# Patient Record
Sex: MALE | Race: WHITE | NOT HISPANIC OR LATINO | Employment: OTHER | URBAN - METROPOLITAN AREA
[De-identification: names, ages, dates, MRNs, and addresses within clinical notes are randomized per-mention and may not be internally consistent; named-entity substitution may affect disease eponyms.]

---

## 2017-05-03 ENCOUNTER — GENERIC CONVERSION - ENCOUNTER (OUTPATIENT)
Dept: OTHER | Facility: OTHER | Age: 77
End: 2017-05-03

## 2017-05-19 ENCOUNTER — GENERIC CONVERSION - ENCOUNTER (OUTPATIENT)
Dept: OTHER | Facility: OTHER | Age: 77
End: 2017-05-19

## 2017-05-31 ENCOUNTER — GENERIC CONVERSION - ENCOUNTER (OUTPATIENT)
Dept: OTHER | Facility: OTHER | Age: 77
End: 2017-05-31

## 2017-06-01 ENCOUNTER — GENERIC CONVERSION - ENCOUNTER (OUTPATIENT)
Dept: OTHER | Facility: OTHER | Age: 77
End: 2017-06-01

## 2017-06-20 ENCOUNTER — APPOINTMENT (OUTPATIENT)
Dept: CARDIAC REHAB | Facility: HOSPITAL | Age: 77
End: 2017-06-20
Payer: MEDICARE

## 2017-06-20 PROCEDURE — 93797 PHYS/QHP OP CAR RHAB WO ECG: CPT

## 2017-06-23 ENCOUNTER — APPOINTMENT (OUTPATIENT)
Dept: LAB | Facility: HOSPITAL | Age: 77
End: 2017-06-23
Attending: INTERNAL MEDICINE
Payer: MEDICARE

## 2017-06-23 ENCOUNTER — TRANSCRIBE ORDERS (OUTPATIENT)
Dept: ADMINISTRATIVE | Facility: HOSPITAL | Age: 77
End: 2017-06-23

## 2017-06-23 ENCOUNTER — ALLSCRIPTS OFFICE VISIT (OUTPATIENT)
Dept: OTHER | Facility: OTHER | Age: 77
End: 2017-06-23

## 2017-06-23 DIAGNOSIS — I25.10 ATHEROSCLEROTIC HEART DISEASE OF NATIVE CORONARY ARTERY WITHOUT ANGINA PECTORIS: ICD-10-CM

## 2017-06-23 LAB
ANION GAP SERPL CALCULATED.3IONS-SCNC: 5 MMOL/L (ref 4–13)
BUN SERPL-MCNC: 19 MG/DL (ref 5–25)
CALCIUM SERPL-MCNC: 8.9 MG/DL (ref 8.3–10.1)
CHLORIDE SERPL-SCNC: 101 MMOL/L (ref 100–108)
CO2 SERPL-SCNC: 34 MMOL/L (ref 21–32)
CREAT SERPL-MCNC: 1.26 MG/DL (ref 0.6–1.3)
GFR SERPL CREATININE-BSD FRML MDRD: 55.6 ML/MIN/1.73SQ M
GLUCOSE SERPL-MCNC: 103 MG/DL (ref 65–140)
POTASSIUM SERPL-SCNC: 3.7 MMOL/L (ref 3.5–5.3)
SODIUM SERPL-SCNC: 140 MMOL/L (ref 136–145)

## 2017-06-23 PROCEDURE — 80048 BASIC METABOLIC PNL TOTAL CA: CPT

## 2017-06-23 PROCEDURE — 36415 COLL VENOUS BLD VENIPUNCTURE: CPT

## 2017-06-26 ENCOUNTER — APPOINTMENT (OUTPATIENT)
Dept: CARDIAC REHAB | Facility: HOSPITAL | Age: 77
End: 2017-06-26
Payer: MEDICARE

## 2017-06-26 PROCEDURE — 93798 PHYS/QHP OP CAR RHAB W/ECG: CPT

## 2017-06-28 ENCOUNTER — APPOINTMENT (OUTPATIENT)
Dept: CARDIAC REHAB | Facility: HOSPITAL | Age: 77
End: 2017-06-28
Payer: MEDICARE

## 2017-06-28 PROCEDURE — 93798 PHYS/QHP OP CAR RHAB W/ECG: CPT

## 2017-06-30 ENCOUNTER — APPOINTMENT (OUTPATIENT)
Dept: CARDIAC REHAB | Facility: HOSPITAL | Age: 77
End: 2017-06-30
Payer: MEDICARE

## 2017-07-03 ENCOUNTER — APPOINTMENT (OUTPATIENT)
Dept: CARDIAC REHAB | Facility: HOSPITAL | Age: 77
End: 2017-07-03
Payer: MEDICARE

## 2017-07-05 ENCOUNTER — APPOINTMENT (OUTPATIENT)
Dept: CARDIAC REHAB | Facility: HOSPITAL | Age: 77
End: 2017-07-05
Payer: MEDICARE

## 2017-07-05 PROCEDURE — 93798 PHYS/QHP OP CAR RHAB W/ECG: CPT

## 2017-07-07 ENCOUNTER — APPOINTMENT (OUTPATIENT)
Dept: CARDIAC REHAB | Facility: HOSPITAL | Age: 77
End: 2017-07-07
Payer: MEDICARE

## 2017-07-07 PROCEDURE — 93798 PHYS/QHP OP CAR RHAB W/ECG: CPT

## 2017-07-10 ENCOUNTER — APPOINTMENT (OUTPATIENT)
Dept: CARDIAC REHAB | Facility: HOSPITAL | Age: 77
End: 2017-07-10
Payer: MEDICARE

## 2017-07-12 ENCOUNTER — APPOINTMENT (OUTPATIENT)
Dept: CARDIAC REHAB | Facility: HOSPITAL | Age: 77
End: 2017-07-12
Payer: MEDICARE

## 2017-07-12 PROCEDURE — 93798 PHYS/QHP OP CAR RHAB W/ECG: CPT

## 2017-07-14 ENCOUNTER — APPOINTMENT (OUTPATIENT)
Dept: CARDIAC REHAB | Facility: HOSPITAL | Age: 77
End: 2017-07-14
Payer: MEDICARE

## 2017-07-14 PROCEDURE — 93798 PHYS/QHP OP CAR RHAB W/ECG: CPT

## 2017-07-17 ENCOUNTER — APPOINTMENT (OUTPATIENT)
Dept: CARDIAC REHAB | Facility: HOSPITAL | Age: 77
End: 2017-07-17
Payer: MEDICARE

## 2017-07-17 PROCEDURE — 93798 PHYS/QHP OP CAR RHAB W/ECG: CPT

## 2017-07-19 ENCOUNTER — APPOINTMENT (OUTPATIENT)
Dept: CARDIAC REHAB | Facility: HOSPITAL | Age: 77
End: 2017-07-19
Payer: MEDICARE

## 2017-07-19 PROCEDURE — 93798 PHYS/QHP OP CAR RHAB W/ECG: CPT

## 2017-07-21 ENCOUNTER — APPOINTMENT (OUTPATIENT)
Dept: CARDIAC REHAB | Facility: HOSPITAL | Age: 77
End: 2017-07-21
Payer: MEDICARE

## 2017-07-21 PROCEDURE — 93798 PHYS/QHP OP CAR RHAB W/ECG: CPT

## 2017-07-24 ENCOUNTER — APPOINTMENT (OUTPATIENT)
Dept: CARDIAC REHAB | Facility: HOSPITAL | Age: 77
End: 2017-07-24
Payer: MEDICARE

## 2017-07-24 PROCEDURE — 93798 PHYS/QHP OP CAR RHAB W/ECG: CPT

## 2017-07-26 ENCOUNTER — APPOINTMENT (OUTPATIENT)
Dept: CARDIAC REHAB | Facility: HOSPITAL | Age: 77
End: 2017-07-26
Payer: MEDICARE

## 2017-07-26 PROCEDURE — 93798 PHYS/QHP OP CAR RHAB W/ECG: CPT

## 2017-07-28 ENCOUNTER — APPOINTMENT (OUTPATIENT)
Dept: CARDIAC REHAB | Facility: HOSPITAL | Age: 77
End: 2017-07-28
Payer: MEDICARE

## 2017-07-31 ENCOUNTER — APPOINTMENT (OUTPATIENT)
Dept: CARDIAC REHAB | Facility: HOSPITAL | Age: 77
End: 2017-07-31
Payer: MEDICARE

## 2017-07-31 PROCEDURE — 93798 PHYS/QHP OP CAR RHAB W/ECG: CPT

## 2017-08-02 ENCOUNTER — APPOINTMENT (OUTPATIENT)
Dept: CARDIAC REHAB | Facility: HOSPITAL | Age: 77
End: 2017-08-02
Payer: MEDICARE

## 2017-08-04 ENCOUNTER — APPOINTMENT (OUTPATIENT)
Dept: CARDIAC REHAB | Facility: HOSPITAL | Age: 77
End: 2017-08-04
Payer: MEDICARE

## 2017-08-04 PROCEDURE — 93798 PHYS/QHP OP CAR RHAB W/ECG: CPT

## 2017-08-07 ENCOUNTER — APPOINTMENT (OUTPATIENT)
Dept: CARDIAC REHAB | Facility: HOSPITAL | Age: 77
End: 2017-08-07
Payer: MEDICARE

## 2017-08-09 ENCOUNTER — APPOINTMENT (OUTPATIENT)
Dept: CARDIAC REHAB | Facility: HOSPITAL | Age: 77
End: 2017-08-09
Payer: MEDICARE

## 2017-08-09 PROCEDURE — 93798 PHYS/QHP OP CAR RHAB W/ECG: CPT

## 2017-08-11 ENCOUNTER — APPOINTMENT (OUTPATIENT)
Dept: CARDIAC REHAB | Facility: HOSPITAL | Age: 77
End: 2017-08-11
Payer: MEDICARE

## 2017-08-11 PROCEDURE — 93798 PHYS/QHP OP CAR RHAB W/ECG: CPT

## 2017-08-14 ENCOUNTER — APPOINTMENT (OUTPATIENT)
Dept: CARDIAC REHAB | Facility: HOSPITAL | Age: 77
End: 2017-08-14
Payer: MEDICARE

## 2017-08-14 PROCEDURE — 93798 PHYS/QHP OP CAR RHAB W/ECG: CPT

## 2017-08-16 ENCOUNTER — APPOINTMENT (OUTPATIENT)
Dept: CARDIAC REHAB | Facility: HOSPITAL | Age: 77
End: 2017-08-16
Payer: MEDICARE

## 2017-08-16 PROCEDURE — 93798 PHYS/QHP OP CAR RHAB W/ECG: CPT

## 2017-08-18 ENCOUNTER — APPOINTMENT (OUTPATIENT)
Dept: CARDIAC REHAB | Facility: HOSPITAL | Age: 77
End: 2017-08-18
Payer: MEDICARE

## 2017-08-18 PROCEDURE — 93798 PHYS/QHP OP CAR RHAB W/ECG: CPT

## 2017-08-25 ENCOUNTER — APPOINTMENT (OUTPATIENT)
Dept: CARDIAC REHAB | Facility: HOSPITAL | Age: 77
End: 2017-08-25
Payer: MEDICARE

## 2017-08-25 PROCEDURE — 93798 PHYS/QHP OP CAR RHAB W/ECG: CPT

## 2017-08-28 ENCOUNTER — APPOINTMENT (OUTPATIENT)
Dept: CARDIAC REHAB | Facility: HOSPITAL | Age: 77
End: 2017-08-28
Payer: MEDICARE

## 2017-08-28 PROCEDURE — 93798 PHYS/QHP OP CAR RHAB W/ECG: CPT

## 2018-01-14 VITALS — SYSTOLIC BLOOD PRESSURE: 150 MMHG | HEART RATE: 62 BPM | DIASTOLIC BLOOD PRESSURE: 90 MMHG | WEIGHT: 228 LBS

## 2018-06-11 ENCOUNTER — OFFICE VISIT (OUTPATIENT)
Dept: CARDIOLOGY CLINIC | Facility: CLINIC | Age: 78
End: 2018-06-11
Payer: MEDICARE

## 2018-06-11 VITALS
SYSTOLIC BLOOD PRESSURE: 160 MMHG | WEIGHT: 223 LBS | DIASTOLIC BLOOD PRESSURE: 60 MMHG | HEART RATE: 64 BPM | BODY MASS INDEX: 31.92 KG/M2 | HEIGHT: 70 IN

## 2018-06-11 DIAGNOSIS — I48.0 PAROXYSMAL ATRIAL FIBRILLATION (HCC): Primary | ICD-10-CM

## 2018-06-11 PROCEDURE — 93000 ELECTROCARDIOGRAM COMPLETE: CPT | Performed by: INTERNAL MEDICINE

## 2018-06-11 PROCEDURE — 99214 OFFICE O/P EST MOD 30 MIN: CPT | Performed by: INTERNAL MEDICINE

## 2018-06-11 RX ORDER — METOPROLOL SUCCINATE 25 MG/1
TABLET, EXTENDED RELEASE ORAL
Status: ON HOLD | COMMUNITY
Start: 2017-06-23 | End: 2018-06-21

## 2018-06-11 RX ORDER — WARFARIN SODIUM 2.5 MG/1
2.5 TABLET ORAL
COMMUNITY
Start: 2017-06-23 | End: 2018-06-22 | Stop reason: HOSPADM

## 2018-06-11 RX ORDER — FAMOTIDINE 20 MG/1
TABLET, FILM COATED ORAL
COMMUNITY
Start: 2017-06-23 | End: 2019-01-07 | Stop reason: SDUPTHER

## 2018-06-11 RX ORDER — TRAMADOL HYDROCHLORIDE 50 MG/1
TABLET ORAL AS NEEDED
COMMUNITY
Start: 2017-06-23 | End: 2021-08-11 | Stop reason: SDUPTHER

## 2018-06-11 RX ORDER — FUROSEMIDE 80 MG
TABLET ORAL
COMMUNITY
Start: 2017-06-23 | End: 2018-06-22 | Stop reason: HOSPADM

## 2018-06-11 RX ORDER — TAMSULOSIN HYDROCHLORIDE 0.4 MG/1
CAPSULE ORAL
COMMUNITY
Start: 2017-06-23 | End: 2019-02-04 | Stop reason: SDUPTHER

## 2018-06-11 RX ORDER — POTASSIUM CHLORIDE 750 MG/1
TABLET, EXTENDED RELEASE ORAL 2 TIMES DAILY
COMMUNITY
Start: 2017-06-23 | End: 2018-06-22 | Stop reason: HOSPADM

## 2018-06-11 NOTE — PROGRESS NOTES
Cardiology Follow Up    Oscar Kwon  1940  [id number]  [address] [facility] [place] No  [address]  [address] [facility] [address]  Paroxysmal atrial fibrillation (HCC)  POCT ECG       Interval History: Followup for PAF and CAD    Hospitalized for atrial fibrillation and cholecystectomy while in [place]  Last episode of symptomatic atrial fibrillation was in May  No past medical history on file  Social History     Social History    Marital status: /Civil Union     Spouse name: N/A    Number of children: N/A    Years of education: N/A     Occupational History    Not on file  Social History Main Topics    Smoking status: Not on file    Smokeless tobacco: Not on file    Alcohol use Not on file    Drug use: Unknown    Sexual activity: Not on file     Other Topics Concern    Not on file     Social History Narrative    No narrative on file      No family history on file  No past surgical history on file      Current Outpatient Prescriptions:     famotidine (PEPCID) 20 mg tablet, Take by mouth, Disp: , Rfl:     furosemide (LASIX) 80 mg tablet, Take by mouth, Disp: , Rfl:     metoprolol succinate (TOPROL-XL) 25 mg 24 hr tablet, Take by mouth, Disp: , Rfl:     potassium chloride (KLOR-CON M10) 10 mEq tablet, Take by mouth, Disp: , Rfl:     tamsulosin (FLOMAX) 0 4 mg, Take by mouth, Disp: , Rfl:     warfarin (COUMADIN) 2 5 mg tablet, Take by mouth, Disp: , Rfl:     traMADol (ULTRAM) 50 mg tablet, Take by mouth, Disp: , Rfl:   No Known Allergies    Labs:     Chemistry        Component Value Date/Time     06/23/2017 1455    K 3 7 06/23/2017 1455     06/23/2017 1455    CO2 34 (H) 06/23/2017 1455    BUN 19 06/23/2017 1455    CREATININE 1 26 06/23/2017 1455        Component Value Date/Time    CALCIUM 8 9 06/23/2017 1455            No results found for: CHOL  No results found for: HDL  No results found for: [address]  No results found for: TRIG  No components found for: CHOLHDL    Imaging: No results found  EKG: Normal Sinus Rhythm  Review of Systems   Constitution: Negative  HENT: Negative  Eyes: Negative  Cardiovascular: Negative  Respiratory: Negative  Endocrine: Negative  Hematologic/Lymphatic: Negative  Skin: Negative  Musculoskeletal: Negative  Gastrointestinal: Negative  Genitourinary: Negative  Neurological: Negative  Psychiatric/Behavioral: Negative  Allergic/Immunologic: Negative  Vitals:    06/11/18 1301   BP: 160/60   Pulse: 64           Physical Exam   Constitutional: He is oriented to person, place, and time  No distress  HENT:   Mouth/Throat: No oropharyngeal exudate  Eyes: No scleral icterus  Neck: No JVD present  Cardiovascular: Normal rate and regular rhythm  No murmur heard  Pulmonary/Chest: Effort normal and breath sounds normal  No respiratory distress  He has no wheezes  He has no rales  Abdominal: Soft  Bowel sounds are normal  He exhibits no distension  There is no tenderness  There is no rebound  Musculoskeletal: He exhibits no edema  Neurological: He is alert and oriented to person, place, and time  Skin: Skin is warm and dry  He is not diaphoretic  Psychiatric: He has a normal mood and affect  His behavior is normal      EKG: NSR  Normal ECG  Discussion/Summary:    Coronary Artery Disease s/p CABG and bioprosthetic AVR  Will review records from his [facility] cardiologist  Unclear why he is off atorvastatin  PAF: In NSR today  Coumadin managed by his [facility] cardiologist  We discussed afib ablation    Cardiomyopathy: Continue Furosemide  Check echocardiogram      PVD: Continue medical therapy  The patient was counseled regarding diagnostic results, instructions for management, risk factor reductions, impressions  total time of encounter was 25 minutes and 15 minutes was spent counseling

## 2018-06-11 NOTE — PATIENT INSTRUCTIONS
Awaiting records from your usual cardiologist    We discussed atrial fibrillation ablation    Check Echocardiogram

## 2018-06-21 ENCOUNTER — HOSPITAL ENCOUNTER (INPATIENT)
Facility: HOSPITAL | Age: 78
LOS: 1 days | Discharge: HOME | DRG: 309 | End: 2018-06-22
Attending: EMERGENCY MEDICINE | Admitting: INTERNAL MEDICINE
Payer: MEDICARE

## 2018-06-21 DIAGNOSIS — I48.0 PAROXYSMAL ATRIAL FIBRILLATION (HCC): Primary | ICD-10-CM

## 2018-06-21 DIAGNOSIS — E87.6 HYPOKALEMIA: ICD-10-CM

## 2018-06-21 PROBLEM — I10 ESSENTIAL HYPERTENSION: Status: ACTIVE | Noted: 2018-06-21

## 2018-06-21 PROBLEM — Z95.3 S/P AORTIC VALVE REPLACEMENT WITH BIOPROSTHETIC VALVE: Status: ACTIVE | Noted: 2018-06-21

## 2018-06-21 PROBLEM — N18.30 STAGE III CHRONIC KIDNEY DISEASE (HCC): Status: ACTIVE | Noted: 2018-06-21

## 2018-06-21 PROBLEM — N40.0 BENIGN PROSTATIC HYPERPLASIA WITHOUT LOWER URINARY TRACT SYMPTOMS: Status: ACTIVE | Noted: 2018-06-21

## 2018-06-21 PROBLEM — I25.10 CORONARY ARTERY DISEASE INVOLVING NATIVE CORONARY ARTERY OF NATIVE HEART WITHOUT ANGINA PECTORIS: Status: ACTIVE | Noted: 2018-06-21

## 2018-06-21 PROBLEM — K21.9 GASTROESOPHAGEAL REFLUX DISEASE WITHOUT ESOPHAGITIS: Status: ACTIVE | Noted: 2018-06-21

## 2018-06-21 LAB
ALBUMIN SERPL BCP-MCNC: 3.2 G/DL (ref 3.5–5)
ALP SERPL-CCNC: 83 U/L (ref 46–116)
ALT SERPL W P-5'-P-CCNC: 58 U/L (ref 12–78)
ANION GAP SERPL CALCULATED.3IONS-SCNC: 6 MMOL/L (ref 4–13)
AST SERPL W P-5'-P-CCNC: 64 U/L (ref 5–45)
BASOPHILS # BLD AUTO: 0.05 THOUSANDS/ΜL (ref 0–0.1)
BASOPHILS NFR BLD AUTO: 1 % (ref 0–1)
BILIRUB SERPL-MCNC: 0.7 MG/DL (ref 0.2–1)
BUN SERPL-MCNC: 13 MG/DL (ref 5–25)
CALCIUM SERPL-MCNC: 8 MG/DL (ref 8.3–10.1)
CHLORIDE SERPL-SCNC: 102 MMOL/L (ref 100–108)
CO2 SERPL-SCNC: 33 MMOL/L (ref 21–32)
CREAT SERPL-MCNC: 1.36 MG/DL (ref 0.6–1.3)
EOSINOPHIL # BLD AUTO: 0.05 THOUSAND/ΜL (ref 0–0.61)
EOSINOPHIL NFR BLD AUTO: 1 % (ref 0–6)
ERYTHROCYTE [DISTWIDTH] IN BLOOD BY AUTOMATED COUNT: 13 % (ref 11.6–15.1)
GFR SERPL CREATININE-BSD FRML MDRD: 50 ML/MIN/1.73SQ M
GLUCOSE SERPL-MCNC: 133 MG/DL (ref 65–140)
HCT VFR BLD AUTO: 41.7 % (ref 36.5–49.3)
HGB BLD-MCNC: 14.2 G/DL (ref 12–17)
IMM GRANULOCYTES # BLD AUTO: 0.02 THOUSAND/UL (ref 0–0.2)
IMM GRANULOCYTES NFR BLD AUTO: 0 % (ref 0–2)
INR PPP: 1.52 (ref 0.86–1.17)
LYMPHOCYTES # BLD AUTO: 2.36 THOUSANDS/ΜL (ref 0.6–4.47)
LYMPHOCYTES NFR BLD AUTO: 40 % (ref 14–44)
MCH RBC QN AUTO: 38.6 PG (ref 26.8–34.3)
MCHC RBC AUTO-ENTMCNC: 34.1 G/DL (ref 31.4–37.4)
MCV RBC AUTO: 113 FL (ref 82–98)
MONOCYTES # BLD AUTO: 0.81 THOUSAND/ΜL (ref 0.17–1.22)
MONOCYTES NFR BLD AUTO: 14 % (ref 4–12)
NEUTROPHILS # BLD AUTO: 2.68 THOUSANDS/ΜL (ref 1.85–7.62)
NEUTS SEG NFR BLD AUTO: 44 % (ref 43–75)
NRBC BLD AUTO-RTO: 0 /100 WBCS
PLATELET # BLD AUTO: 180 THOUSANDS/UL (ref 149–390)
PMV BLD AUTO: 10.4 FL (ref 8.9–12.7)
POTASSIUM SERPL-SCNC: 3.1 MMOL/L (ref 3.5–5.3)
PROT SERPL-MCNC: 7.2 G/DL (ref 6.4–8.2)
PROTHROMBIN TIME: 17.4 SECONDS (ref 11.8–14.2)
RBC # BLD AUTO: 3.68 MILLION/UL (ref 3.88–5.62)
SODIUM SERPL-SCNC: 141 MMOL/L (ref 136–145)
TROPONIN I SERPL-MCNC: <0.02 NG/ML
WBC # BLD AUTO: 5.97 THOUSAND/UL (ref 4.31–10.16)

## 2018-06-21 PROCEDURE — 99223 1ST HOSP IP/OBS HIGH 75: CPT | Performed by: INTERNAL MEDICINE

## 2018-06-21 PROCEDURE — 80053 COMPREHEN METABOLIC PANEL: CPT | Performed by: EMERGENCY MEDICINE

## 2018-06-21 PROCEDURE — 84484 ASSAY OF TROPONIN QUANT: CPT | Performed by: EMERGENCY MEDICINE

## 2018-06-21 PROCEDURE — 93005 ELECTROCARDIOGRAM TRACING: CPT

## 2018-06-21 PROCEDURE — 99285 EMERGENCY DEPT VISIT HI MDM: CPT

## 2018-06-21 PROCEDURE — 85025 COMPLETE CBC W/AUTO DIFF WBC: CPT | Performed by: EMERGENCY MEDICINE

## 2018-06-21 PROCEDURE — 96365 THER/PROPH/DIAG IV INF INIT: CPT

## 2018-06-21 PROCEDURE — 96376 TX/PRO/DX INJ SAME DRUG ADON: CPT

## 2018-06-21 PROCEDURE — 85610 PROTHROMBIN TIME: CPT | Performed by: EMERGENCY MEDICINE

## 2018-06-21 PROCEDURE — 36415 COLL VENOUS BLD VENIPUNCTURE: CPT | Performed by: EMERGENCY MEDICINE

## 2018-06-21 RX ORDER — WARFARIN SODIUM 5 MG/1
5 TABLET ORAL
Status: ON HOLD | COMMUNITY
End: 2018-06-22

## 2018-06-21 RX ORDER — HEPARIN SODIUM 5000 [USP'U]/ML
5000 INJECTION, SOLUTION INTRAVENOUS; SUBCUTANEOUS EVERY 8 HOURS SCHEDULED
Status: DISCONTINUED | OUTPATIENT
Start: 2018-06-21 | End: 2018-06-22 | Stop reason: HOSPADM

## 2018-06-21 RX ORDER — TAMSULOSIN HYDROCHLORIDE 0.4 MG/1
0.4 CAPSULE ORAL
Status: DISCONTINUED | OUTPATIENT
Start: 2018-06-21 | End: 2018-06-22 | Stop reason: HOSPADM

## 2018-06-21 RX ORDER — ACETAMINOPHEN 325 MG/1
650 TABLET ORAL EVERY 6 HOURS PRN
Status: DISCONTINUED | OUTPATIENT
Start: 2018-06-21 | End: 2018-06-22 | Stop reason: HOSPADM

## 2018-06-21 RX ORDER — SODIUM CHLORIDE 9 MG/ML
1000 INJECTION, SOLUTION INTRAVENOUS CONTINUOUS
Status: DISCONTINUED | OUTPATIENT
Start: 2018-06-21 | End: 2018-06-21

## 2018-06-21 RX ORDER — FUROSEMIDE 80 MG
80 TABLET ORAL DAILY
Status: DISCONTINUED | OUTPATIENT
Start: 2018-06-22 | End: 2018-06-22 | Stop reason: HOSPADM

## 2018-06-21 RX ORDER — FAMOTIDINE 20 MG/1
20 TABLET, FILM COATED ORAL DAILY
Status: DISCONTINUED | OUTPATIENT
Start: 2018-06-22 | End: 2018-06-22 | Stop reason: HOSPADM

## 2018-06-21 RX ORDER — ONDANSETRON 2 MG/ML
4 INJECTION INTRAMUSCULAR; INTRAVENOUS EVERY 6 HOURS PRN
Status: DISCONTINUED | OUTPATIENT
Start: 2018-06-21 | End: 2018-06-22 | Stop reason: HOSPADM

## 2018-06-21 RX ORDER — WARFARIN SODIUM 7.5 MG/1
7.5 TABLET ORAL
Status: COMPLETED | OUTPATIENT
Start: 2018-06-21 | End: 2018-06-21

## 2018-06-21 RX ORDER — DILTIAZEM HYDROCHLORIDE 5 MG/ML
15 INJECTION INTRAVENOUS ONCE
Status: COMPLETED | OUTPATIENT
Start: 2018-06-21 | End: 2018-06-21

## 2018-06-21 RX ORDER — POTASSIUM CHLORIDE 20 MEQ/1
20 TABLET, EXTENDED RELEASE ORAL ONCE
Status: COMPLETED | OUTPATIENT
Start: 2018-06-21 | End: 2018-06-21

## 2018-06-21 RX ORDER — POTASSIUM CHLORIDE 20 MEQ/1
40 TABLET, EXTENDED RELEASE ORAL 2 TIMES DAILY
Status: DISCONTINUED | OUTPATIENT
Start: 2018-06-21 | End: 2018-06-22 | Stop reason: HOSPADM

## 2018-06-21 RX ADMIN — DILTIAZEM HYDROCHLORIDE 10 MG/HR: 5 INJECTION INTRAVENOUS at 11:18

## 2018-06-21 RX ADMIN — METOPROLOL TARTRATE 25 MG: 25 TABLET ORAL at 17:40

## 2018-06-21 RX ADMIN — SODIUM CHLORIDE 1000 ML/HR: 0.9 INJECTION, SOLUTION INTRAVENOUS at 11:06

## 2018-06-21 RX ADMIN — DILTIAZEM HYDROCHLORIDE 15 MG: 5 INJECTION INTRAVENOUS at 11:04

## 2018-06-21 RX ADMIN — METOPROLOL TARTRATE 25 MG: 25 TABLET ORAL at 22:16

## 2018-06-21 RX ADMIN — TAMSULOSIN HYDROCHLORIDE 0.4 MG: 0.4 CAPSULE ORAL at 17:40

## 2018-06-21 RX ADMIN — DILTIAZEM HYDROCHLORIDE 10 MG/HR: 5 INJECTION INTRAVENOUS at 17:41

## 2018-06-21 RX ADMIN — HEPARIN SODIUM 5000 UNITS: 5000 INJECTION, SOLUTION INTRAVENOUS; SUBCUTANEOUS at 17:40

## 2018-06-21 RX ADMIN — POTASSIUM CHLORIDE 20 MEQ: 1500 TABLET, EXTENDED RELEASE ORAL at 12:20

## 2018-06-21 RX ADMIN — POTASSIUM CHLORIDE 40 MEQ: 1500 TABLET, EXTENDED RELEASE ORAL at 17:40

## 2018-06-21 RX ADMIN — WARFARIN SODIUM 7.5 MG: 7.5 TABLET ORAL at 17:39

## 2018-06-21 NOTE — ASSESSMENT & PLAN NOTE
Will increase potassium chloride to 40 mg p    O  b i d  and will monitor potassium level with serial blood work

## 2018-06-21 NOTE — ED PROVIDER NOTES
History  Chief Complaint   Patient presents with    Atrial Fibrillation     Patient presents to the ER stating he has afib and it woke him up 1 hour ago  denies sob or chest pain  Pt c/o rapid heartbeat; Hx of a  Fib in past, usually gets better with cardizem  Denies chest pain or SOB at present        History provided by:  Patient   used: No    Rapid Heart Rate   Palpitations quality:  Fast  Onset quality:  Sudden  Timing:  Constant  Progression:  Unchanged  Chronicity:  Recurrent  Relieved by:  Nothing  Worsened by:  Nothing  Ineffective treatments:  Beta blockers  Risk factors: hx of atrial fibrillation        Prior to Admission Medications   Prescriptions Last Dose Informant Patient Reported? Taking?   famotidine (PEPCID) 20 mg tablet  Self Yes No   Sig: Take by mouth   furosemide (LASIX) 80 mg tablet  Self Yes No   Sig: Take by mouth   metoprolol succinate (TOPROL-XL) 25 mg 24 hr tablet  Self Yes No   Sig: Take by mouth   potassium chloride (KLOR-CON M10) 10 mEq tablet  Self Yes No   Sig: Take by mouth   tamsulosin (FLOMAX) 0 4 mg  Self Yes No   Sig: Take by mouth   traMADol (ULTRAM) 50 mg tablet  Self Yes No   Sig: Take by mouth   warfarin (COUMADIN) 2 5 mg tablet  Self Yes No   Sig: Take by mouth      Facility-Administered Medications: None       No past medical history on file  No past surgical history on file  No family history on file  I have reviewed and agree with the history as documented  Social History   Substance Use Topics    Smoking status: Not on file    Smokeless tobacco: Not on file    Alcohol use Not on file        Review of Systems   Cardiovascular: Positive for palpitations  All other systems reviewed and are negative  Physical Exam  Physical Exam   Constitutional: He is oriented to person, place, and time  He appears well-developed and well-nourished  HENT:   Nose: Nose normal    Eyes: Pupils are equal, round, and reactive to light     Neck: Normal range of motion  Cardiovascular: An irregularly irregular rhythm present  Tachycardia present  Pulmonary/Chest: Effort normal    Abdominal: Soft  Neurological: He is alert and oriented to person, place, and time  Skin: Skin is warm and dry  Psychiatric: He has a normal mood and affect   His behavior is normal  Judgment and thought content normal        Vital Signs  ED Triage Vitals [06/21/18 1043]   Temperature Pulse Respirations Blood Pressure SpO2   98 1 °F (36 7 °C) (!) 131 20 123/88 97 %      Temp Source Heart Rate Source Patient Position - Orthostatic VS BP Location FiO2 (%)   Temporal Monitor Lying Right arm --      Pain Score       No Pain           Vitals:    06/21/18 1043   BP: 123/88   Pulse: (!) 131   Patient Position - Orthostatic VS: Lying       Visual Acuity      ED Medications  Medications   diltiazem (CARDIZEM) injection 15 mg (not administered)   diltiazem (CARDIZEM) 125 mg in sodium chloride 0 9 % 125 mL infusion (not administered)       Diagnostic Studies  Results Reviewed     Procedure Component Value Units Date/Time    CBC and differential [[id number]] Collected:  06/21/18 1044    Lab Status:  No result Specimen:  Blood from Arm, Left     Comprehensive metabolic panel [[id number]] Collected:  06/21/18 1044    Lab Status:  No result Specimen:  Blood from Arm, Left     Troponin I [[id number]] Collected:  06/21/18 1044    Lab Status:  No result Specimen:  Blood from Arm, Left     Protime-INR [[id number]] Collected:  06/21/18 1044    Lab Status:  No result Specimen:  Blood from Arm, Left                  No orders to display              Procedures  Procedures       Phone Contacts  ED Phone Contact    ED Course                               MDM  Number of Diagnoses or Management Options  Paroxysmal atrial fibrillation ([facility] [address] ): established and worsening     Amount and/or Complexity of Data Reviewed  Clinical lab tests: ordered and reviewed  Tests in the radiology section of CPT®: ordered and reviewed    Patient Progress  Patient progress: improved    The patient presented with a condition in which there was a high probability of imminent or life-threatening deterioration, and critical care services (excluding separately billable procedures) totalled 30-74 minutes (cardizem given for a  fib)  Disposition  Final diagnoses:   None     ED Disposition     None      Follow-up Information    None         Patient's Medications   Discharge Prescriptions    No medications on file     No discharge procedures on file      ED Provider  Electronically Signed by           Jihan Fontaine MD  06/22/18 [phone]

## 2018-06-21 NOTE — ASSESSMENT & PLAN NOTE
Patient has symptomatic recurrent AFib last for about 3 hours now  Will admit patient to the step-down unit  Will control his heart rate with IV Cardizem drip and will increase his Lopressor to 25 mg q 6 hours  Will get an echocardiogram, ask Cardiology to see the patient who will likely stay in the hospital more than 2 midnights  Will increase warfarin dose is INR is subtherapeutic  Will give 7 5 mg of warfarin today    Will provide subcutaneous heparin for DVT prevention until the INR is above 2

## 2018-06-21 NOTE — PLAN OF CARE
CARDIOVASCULAR - ADULT     Maintains optimal cardiac output and hemodynamic stability Progressing     Absence of cardiac dysrhythmias or at baseline rhythm Progressing        DISCHARGE PLANNING     Discharge to home or other facility with appropriate resources Progressing        DISCHARGE PLANNING - CARE MANAGEMENT     Discharge to post-acute care or home with appropriate resources Progressing        INFECTION - ADULT     Absence or prevention of progression during hospitalization Progressing     Absence of fever/infection during neutropenic period Progressing        Knowledge Deficit     Patient/family/caregiver demonstrates understanding of disease process, treatment plan, medications, and discharge instructions Progressing        PAIN - ADULT     Verbalizes/displays adequate comfort level or baseline comfort level Progressing        Potential for Falls     Patient will remain free of falls Progressing        SAFETY ADULT     Maintain or return to baseline ADL function Progressing     Maintain or return mobility status to optimal level Progressing

## 2018-06-21 NOTE — ED PROCEDURE NOTE
PROCEDURE  ECG 12 Lead Documentation  Date/Time: 6/21/2018 10:47 AM  Performed by: Montserrat Shipley  Authorized by: Montserrat Shipley     ECG reviewed by me, the ED Provider: yes    Patient location:  ED  Previous ECG:     Comparison to cardiac monitor: No    Interpretation:     Interpretation: abnormal    Rate:     ECG rate:  150    ECG rate assessment: tachycardic    Rhythm:     Rhythm: atrial fibrillation    Ectopy:     Ectopy: PVCs      PVCs:  Infrequent  QRS:     QRS axis:  Normal    QRS intervals:  Normal  Conduction:     Conduction: normal    ST segments:     ST segments:  Non-specific  T waves:     T waves: non-specific           Shana Kohler MD  06/21/18 1048

## 2018-06-21 NOTE — H&P
H&P- Shiela Mcdonnell 1940, 69 y o  male MRN: [id number]    Unit/Bed#: ED 06 Encounter: [id number]    Primary Care Provider: Leon Winters MD   Date and time admitted to hospital: 6/21/2018 10:41 AM        * Paroxysmal atrial fibrillation [facility])   Assessment & Plan    Patient has symptomatic recurrent AFib last for about 3 hours now  Will admit patient to the step-down unit  Will control his heart rate with IV Cardizem drip and will increase his Lopressor to 25 mg q 6 hours  Will get an echocardiogram, ask Cardiology to see the patient who will likely stay in the hospital more than 2 midnights  Will increase warfarin dose is INR is subtherapeutic  Will give 7 5 mg of warfarin today  Will provide subcutaneous heparin for DVT prevention until the INR is above 2        Coronary artery disease involving native coronary artery of native heart without angina pectoris   Assessment & Plan    He status post CABG without any chest pain at rest or exertion        Essential hypertension   Assessment & Plan    Controlled on Lasix and metoprolol        Benign prostatic hyperplasia without lower urinary tract symptoms   Assessment & Plan    He has no obstructive urinary symptoms on Flomax        Stage III chronic kidney disease   Assessment & Plan    Creatinine is around baseline on review of old records        Gastroesophageal reflux disease without esophagitis   Assessment & Plan    Stable on Pepcid        S/P aortic valve replacement with bioprosthetic valve   Assessment & Plan    Will get echocardiogram to evaluate aortic valve function        Hypokalemia   Assessment & Plan    Will increase potassium chloride to 40 mg p    O  b i d  and will monitor potassium level with serial blood work            VTE Prophylaxis: ordered    Code Status: as above    Anticipated Length of Stay: as above    Justification for Hospital Stay: see assessment and plan        Chief Complaint:   Palpitation    History of Present Illness:    Eleni Wahl is a 69 y o  male who presents with above chief compaint  Patient developed sudden onset of palpitation feelings around 10 o'clock  He denies chest pain, like his, dizziness  This is about the 3rd episode of recurrent AFib  He had cardioversion before  He came to the ER for evaluation where he was found to be in rapid atrial fibrillation with heart rates were than 120  Currently denies chest pain, shortness of breath colitis, dizziness  Denies any signs of bleeding on warfarin which was on hold last week due for a skin surgery  Denies history of diabetes, strokes or TIA        Review of Systems:    Review of Systems   Constitutional: Negative for fever  HENT: Negative for congestion  Eyes: Negative for visual disturbance  Respiratory: Negative for cough and shortness of breath  Cardiovascular: Positive for palpitations  Negative for chest pain and leg swelling  Gastrointestinal: Negative for abdominal pain, blood in stool and diarrhea  Endocrine: Negative for polydipsia and polyphagia  Genitourinary: Negative for difficulty urinating and dysuria  Musculoskeletal: Negative for joint swelling, myalgias and neck stiffness  Skin: Negative for rash  Neurological: Negative for dizziness, weakness, numbness and headaches  Hematological: Negative for adenopathy  Psychiatric/Behavioral: Negative for dysphoric mood  All other systems reviewed and are negative        Past Medical and Surgical History:     Past Medical History:   Diagnosis Date    Atrial fibrillation ([facility] [address] )     BPH (benign prostatic hyperplasia)     CHF (congestive heart failure) ([facility])     GERD (gastroesophageal reflux disease)     Skin cancer     Sleep apnea        Past Surgical History:   Procedure Laterality Date    CARDIAC SURGERY      CHOLECYSTECTOMY      CORONARY ARTERY BYPASS GRAFT      CORONARY STENT PLACEMENT      TONSILLECTOMY      VALVE REPLACEMENT Meds/Allergies:    Prior to Admission Medications   Prescriptions Last Dose Informant Patient Reported? Taking?   famotidine (PEPCID) 20 mg tablet  Self Yes No   Sig: Take by mouth   furosemide (LASIX) 80 mg tablet  Self Yes No   Sig: Take by mouth   metoprolol succinate (TOPROL-XL) 25 mg 24 hr tablet  Self Yes No   Sig: Take by mouth   potassium chloride (KLOR-CON M10) 10 mEq tablet  Self Yes No   Sig: Take by mouth   tamsulosin (FLOMAX) 0 4 mg  Self Yes No   Sig: Take by mouth   traMADol (ULTRAM) 50 mg tablet  Self Yes No   Sig: Take by mouth   warfarin (COUMADIN) 2 5 mg tablet  Self Yes No   Sig: Take 2 5 mg by mouth Sun, mon, tues, fri    warfarin (COUMADIN) 5 mg tablet   Yes Yes   Sig: Take 5 mg by mouth daily Wed, thurs, sat      Facility-Administered Medications: None       Allergies: No Known Allergies    Social History:     History   Alcohol Use    Yes     History   Smoking Status    Never Smoker   Smokeless Tobacco    Never Used     History   Drug Use No       Family History:    Family History   Problem Relation Age of Onset    Cancer Mother     Dementia Father        Physical Exam:     Vitals:   Blood Pressure: 122/81 (06/21/18 1245)  Pulse: (!) 121 (06/21/18 1245)  Temperature: 98 1 °F (36 7 °C) (06/21/18 1043)  Temp Source: Temporal (06/21/18 1043)  Respirations: 22 (06/21/18 1245)  Height: 5' 10" (177 8 cm) (06/21/18 1043)  Weight - Scale: 99 8 kg (220 lb) (06/21/18 1043)  SpO2: 97 % (06/21/18 1245)    Physical Exam   Constitutional: He is oriented to person, place, and time  He appears well-developed  No distress  HENT:   Head: Normocephalic  Mouth/Throat: Oropharynx is clear and moist    Eyes: Conjunctivae are normal    Neck: Neck supple  Cardiovascular:   Irregularly irregular tachycardic, patient has trace pedal edema, has varicose veins without ulcers   Pulmonary/Chest: Effort normal  No respiratory distress  He has no wheezes  He has no rales  Abdominal: Soft   Bowel sounds are normal  He exhibits no distension  There is no tenderness  Musculoskeletal: He exhibits no tenderness  Lymphadenopathy:     He has no cervical adenopathy  Neurological: He is alert and oriented to person, place, and time  No cranial nerve deficit  Skin: Skin is warm and dry  No rash noted  Psychiatric: He has a normal mood and affect  Vitals reviewed  Additional Data:     Lab Results: I personally reviewed them      Results from last 7 days  Lab Units 06/21/18  1044   WBC Thousand/uL 5 97   HEMOGLOBIN g/dL 14 2   HEMATOCRIT % 41 7   PLATELETS Thousands/uL 180   NEUTROS PCT % 44   LYMPHS PCT % 40   MONOS PCT % 14*   EOS PCT % 1       Results from last 7 days  Lab Units 06/21/18  1044   SODIUM mmol/L 141   POTASSIUM mmol/L 3 1*   CHLORIDE mmol/L 102   CO2 mmol/L 33*   BUN mg/dL 13   CREATININE mg/dL 1 36*   CALCIUM mg/dL 8 0*   TOTAL PROTEIN g/dL 7 2   BILIRUBIN TOTAL mg/dL 0 70   ALK PHOS U/L 83   ALT U/L 58   AST U/L 64*   GLUCOSE RANDOM mg/dL 133       Results from last 7 days  Lab Units 06/21/18  1044   INR  1 52*       Imaging: I personally reviewed them    No orders to display       EKG : I personally reviewed      Ramya Alvarez MD    ** Please Note: This note has been constructed using a voice recognition system   **

## 2018-06-22 ENCOUNTER — APPOINTMENT (INPATIENT)
Dept: RADIOLOGY | Facility: HOSPITAL | Age: 78
DRG: 309 | End: 2018-06-22
Payer: MEDICARE

## 2018-06-22 ENCOUNTER — APPOINTMENT (INPATIENT)
Dept: NON INVASIVE DIAGNOSTICS | Facility: HOSPITAL | Age: 78
DRG: 309 | End: 2018-06-22
Payer: MEDICARE

## 2018-06-22 VITALS
WEIGHT: 221.12 LBS | HEART RATE: 65 BPM | TEMPERATURE: 98.1 F | RESPIRATION RATE: 18 BRPM | HEIGHT: 70 IN | DIASTOLIC BLOOD PRESSURE: 60 MMHG | OXYGEN SATURATION: 97 % | BODY MASS INDEX: 31.66 KG/M2 | SYSTOLIC BLOOD PRESSURE: 127 MMHG

## 2018-06-22 PROBLEM — E87.6 HYPOKALEMIA: Status: RESOLVED | Noted: 2018-06-21 | Resolved: 2018-06-22

## 2018-06-22 LAB
ANION GAP SERPL CALCULATED.3IONS-SCNC: 8 MMOL/L (ref 4–13)
ATRIAL RATE: 120 BPM
BUN SERPL-MCNC: 19 MG/DL (ref 5–25)
CALCIUM SERPL-MCNC: 7.9 MG/DL (ref 8.3–10.1)
CHLORIDE SERPL-SCNC: 104 MMOL/L (ref 100–108)
CO2 SERPL-SCNC: 28 MMOL/L (ref 21–32)
CREAT SERPL-MCNC: 1.4 MG/DL (ref 0.6–1.3)
ERYTHROCYTE [DISTWIDTH] IN BLOOD BY AUTOMATED COUNT: 13.3 % (ref 11.6–15.1)
GFR SERPL CREATININE-BSD FRML MDRD: 48 ML/MIN/1.73SQ M
GLUCOSE SERPL-MCNC: 110 MG/DL (ref 65–140)
HCT VFR BLD AUTO: 36.1 % (ref 36.5–49.3)
HGB BLD-MCNC: 12.4 G/DL (ref 12–17)
INR PPP: 1.86 (ref 0.86–1.17)
MCH RBC QN AUTO: 39.4 PG (ref 26.8–34.3)
MCHC RBC AUTO-ENTMCNC: 34.3 G/DL (ref 31.4–37.4)
MCV RBC AUTO: 115 FL (ref 82–98)
PLATELET # BLD AUTO: 163 THOUSANDS/UL (ref 149–390)
PMV BLD AUTO: 9.9 FL (ref 8.9–12.7)
POTASSIUM SERPL-SCNC: 3.8 MMOL/L (ref 3.5–5.3)
PROTHROMBIN TIME: 20.4 SECONDS (ref 11.8–14.2)
QRS AXIS: 56 DEGREES
QRSD INTERVAL: 98 MS
QT INTERVAL: 332 MS
QTC INTERVAL: 524 MS
RBC # BLD AUTO: 3.15 MILLION/UL (ref 3.88–5.62)
SODIUM SERPL-SCNC: 140 MMOL/L (ref 136–145)
T WAVE AXIS: 93 DEGREES
TROPONIN I SERPL-MCNC: 0.02 NG/ML
TSH SERPL DL<=0.05 MIU/L-ACNC: 3.89 UIU/ML (ref 0.36–3.74)
VENTRICULAR RATE: 150 BPM
WBC # BLD AUTO: 6.13 THOUSAND/UL (ref 4.31–10.16)

## 2018-06-22 PROCEDURE — G8979 MOBILITY GOAL STATUS: HCPCS

## 2018-06-22 PROCEDURE — 93010 ELECTROCARDIOGRAM REPORT: CPT | Performed by: INTERNAL MEDICINE

## 2018-06-22 PROCEDURE — G8978 MOBILITY CURRENT STATUS: HCPCS

## 2018-06-22 PROCEDURE — 93306 TTE W/DOPPLER COMPLETE: CPT | Performed by: INTERNAL MEDICINE

## 2018-06-22 PROCEDURE — 84484 ASSAY OF TROPONIN QUANT: CPT | Performed by: NURSE PRACTITIONER

## 2018-06-22 PROCEDURE — 84443 ASSAY THYROID STIM HORMONE: CPT | Performed by: INTERNAL MEDICINE

## 2018-06-22 PROCEDURE — 71046 X-RAY EXAM CHEST 2 VIEWS: CPT

## 2018-06-22 PROCEDURE — 97161 PT EVAL LOW COMPLEX 20 MIN: CPT

## 2018-06-22 PROCEDURE — G8980 MOBILITY D/C STATUS: HCPCS

## 2018-06-22 PROCEDURE — 85027 COMPLETE CBC AUTOMATED: CPT | Performed by: INTERNAL MEDICINE

## 2018-06-22 PROCEDURE — 99222 1ST HOSP IP/OBS MODERATE 55: CPT | Performed by: INTERNAL MEDICINE

## 2018-06-22 PROCEDURE — 99238 HOSP IP/OBS DSCHRG MGMT 30/<: CPT | Performed by: INTERNAL MEDICINE

## 2018-06-22 PROCEDURE — 80048 BASIC METABOLIC PNL TOTAL CA: CPT | Performed by: INTERNAL MEDICINE

## 2018-06-22 PROCEDURE — 85610 PROTHROMBIN TIME: CPT | Performed by: INTERNAL MEDICINE

## 2018-06-22 PROCEDURE — 93306 TTE W/DOPPLER COMPLETE: CPT

## 2018-06-22 RX ORDER — WARFARIN SODIUM 7.5 MG/1
7.5 TABLET ORAL
Status: DISCONTINUED | OUTPATIENT
Start: 2018-06-22 | End: 2018-06-22 | Stop reason: HOSPADM

## 2018-06-22 RX ORDER — WARFARIN SODIUM 5 MG/1
TABLET ORAL
Refills: 0 | Status: ON HOLD
Start: 2018-06-22 | End: 2018-08-25

## 2018-06-22 RX ORDER — WARFARIN SODIUM 7.5 MG/1
TABLET ORAL
Refills: 0
Start: 2018-06-22 | End: 2018-08-25 | Stop reason: HOSPADM

## 2018-06-22 RX ORDER — POTASSIUM CHLORIDE 20 MEQ/1
40 TABLET, EXTENDED RELEASE ORAL 2 TIMES DAILY
Qty: 60 TABLET | Refills: 0 | Status: SHIPPED | OUTPATIENT
Start: 2018-06-22 | End: 2018-06-28 | Stop reason: SDUPTHER

## 2018-06-22 RX ORDER — FUROSEMIDE 80 MG
80 TABLET ORAL DAILY
Refills: 0
Start: 2018-06-23 | End: 2019-08-14

## 2018-06-22 RX ADMIN — FAMOTIDINE 20 MG: 20 TABLET ORAL at 08:11

## 2018-06-22 RX ADMIN — FUROSEMIDE 80 MG: 80 TABLET ORAL at 08:11

## 2018-06-22 RX ADMIN — METOPROLOL TARTRATE 12.5 MG: 25 TABLET ORAL at 11:14

## 2018-06-22 RX ADMIN — HEPARIN SODIUM 5000 UNITS: 5000 INJECTION, SOLUTION INTRAVENOUS; SUBCUTANEOUS at 06:16

## 2018-06-22 RX ADMIN — TAMSULOSIN HYDROCHLORIDE 0.4 MG: 0.4 CAPSULE ORAL at 16:44

## 2018-06-22 RX ADMIN — HEPARIN SODIUM 5000 UNITS: 5000 INJECTION, SOLUTION INTRAVENOUS; SUBCUTANEOUS at 13:59

## 2018-06-22 RX ADMIN — POTASSIUM CHLORIDE 40 MEQ: 1500 TABLET, EXTENDED RELEASE ORAL at 08:11

## 2018-06-22 NOTE — DISCHARGE SUMMARY
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]    HPI:  70-year-old male admitted with atrial fibrillation with rapid ventricular response  Consults  Cardiology-Dr Steven Azevedo  Procedures Performed:   Orders Placed This Encounter   Procedures    ED ECG Documentation Only       Hospital Course:  Patient had recurrent episode of atrial fibrillation with rates in 130-140 beat per minute range-he was started on IV diltiazem and was given increased potassium doses as he was hypokalemic at 3 1  He converted to normal sinus rhythm in the early morning hours of June 22, 2018  Patient will be discharged home on beta-blocker therapy as well as increased potassium dose and increased Coumadin dose as his INR was low at 1 52 on arrival   Will be instructed to take 7 5 mg tonight and then 5 mg daily until he has repeat protime done on Monday 6 25 and follow with Dr Devin Oliva his primary cardiologist   Patient had a prior episode in [place] several months ago and discussion was had at that time for consideration for ablation procedure  This will be addressed by Cardiology as an outpatient appointment  Patient's echocardiogram does show some increased velocities across the aortic valve and there is some question of recurrent stenosis  This will need to be addressed by his cardiologist     Significant Findings, Care, Treatment and Services Provided:     echo      Condition at Discharge: good     Discharge instructions/Information to patient and family:   See after visit summary for information provided to patient and family  Provisions for Follow-Up Care:  See after visit summary for information related to follow-up care and any pertinent home health orders        Disposition: Home    Planned Readmission: No    Discharge Statement   I spent 25 minutes discharging the patient  This time was spent on the day of discharge  I had direct contact with the patient on the day of discharge  Discharge Medications:  See after visit summary for reconciled discharge medications provided to patient and family          Ilsa Terrell DO

## 2018-06-22 NOTE — CONSULTS
Consultation - Cardiology   Eleni Wahl 69 y o  male MRN: [id number]  Unit/Bed#: -02 Encounter: [id number]      Assessment:  Principal Problem:    Paroxysmal atrial fibrillation ([address] )  Active Problems:    Coronary artery disease involving native coronary artery of native heart without angina pectoris    Essential hypertension    S/P aortic valve replacement with bioprosthetic valve    Benign prostatic hyperplasia without lower urinary tract symptoms    Gastroesophageal reflux disease without esophagitis    Stage III chronic kidney disease      Plan:  Patient is comfortable  He has no chest pain or significant dyspnea  Would recommend increasing his potassium supplement at discharge as he was hypokalemic on presentation and this may have prompted his paroxysm of AFib  He will follow up with his primary cardiologist as an outpatient  I will check his echocardiogram   He is stable for discharge planning from my point of view  Thank you for this consultation  History of Present Illness   Physician Requesting Consult: Sean Lopez MD  Reason for Consult / Principal Problem:  Palpitations  HPI: Eleni Wahl is a 69y o  year old male who presents with palpitations  Patient has a known history of paroxysmal atrial fibrillation  He is followed in our practice by Dr Fritz Ellison  He was most recently seen earlier this month  He presented with rapid atrial fibrillation and is now in sinus rhythm and is comfortable  He was noted to be hypokalemic on presentation with a potassium of 3 1  He is now 3 8  His troponins are negative  Patient has a history of coronary artery bypass graft surgery and bioprosthetic aortic valve replacement in [place]  An echocardiogram has been ordered and is pending  Prior echocardiogram demonstrated an ejection fraction of 40-45%  We are asked to provide advice in reference to further management      Consults    Review of Systems:  Review of Systems - Negative except     Historical Information   Past Medical History:   Diagnosis Date    Atrial fibrillation (HCC)     BPH (benign prostatic hyperplasia)     CHF (congestive heart failure) (HCC)     GERD (gastroesophageal reflux disease)     Skin cancer     Sleep apnea      Past Surgical History:   Procedure Laterality Date    CARDIAC SURGERY      CHOLECYSTECTOMY      CORONARY ARTERY BYPASS GRAFT      CORONARY STENT PLACEMENT      TONSILLECTOMY      VALVE REPLACEMENT       History   Alcohol Use    Yes     History   Drug Use No     History   Smoking Status    Never Smoker   Smokeless Tobacco    Never Used     Family History: non-contributory    Meds/Allergies   all current active meds have been reviewed  No Known Allergies    Objective   Vitals: Blood pressure 139/65, pulse 58, temperature (!) 97 4 °F (36 3 °C), resp  rate 20, height 5' 10" (1 778 m), weight 100 kg (221 lb 1 9 oz), SpO2 98 %  , Body mass index is 31 73 kg/m² , Orthostatic Blood Pressures      Most Recent Value   Blood Pressure  139/65 filed at 06/22/2018 1114   Patient Position - Orthostatic VS  Sitting filed at 06/22/2018 1041            Intake/Output Summary (Last 24 hours) at 06/22/18 1249  Last data filed at 06/22/18 0139   Gross per 24 hour   Intake              103 ml   Output                0 ml   Net              103 ml       Invasive Devices     Peripheral Intravenous Line            Peripheral IV 06/21/18 Left Antecubital 1 day                Physical Exam   Constitutional: He is oriented to person, place, and time  He appears well-developed and well-nourished  HENT:   Head: Normocephalic and atraumatic  Eyes: Conjunctivae are normal    Neck: Normal range of motion  Neck supple  Cardiovascular: Normal rate  Murmur heard  Pulmonary/Chest: Effort normal and breath sounds normal    Neurological: He is alert and oriented to person, place, and time  Skin: Skin is warm and dry  Psychiatric: He has a normal mood and affect  Vitals reviewed        Lab Results:   Admission on 06/21/2018   Component Date Value    WBC 06/21/2018 5 97     RBC 06/21/2018 3 68*    Hemoglobin 06/21/2018 14 2     Hematocrit 06/21/2018 41 7     MCV 06/21/2018 113*    MCH 06/21/2018 38 6*    MCHC 06/21/2018 34 1     RDW 06/21/2018 13 0     MPV 06/21/2018 10 4     Platelets 40/22/7295 180     nRBC 06/21/2018 0     Neutrophils Relative 06/21/2018 44     Immat GRANS % 06/21/2018 0     Lymphocytes Relative 06/21/2018 40     Monocytes Relative 06/21/2018 14*    Eosinophils Relative 06/21/2018 1     Basophils Relative 06/21/2018 1     Neutrophils Absolute 06/21/2018 2 68     Immature Grans Absolute 06/21/2018 0 02     Lymphocytes Absolute 06/21/2018 2 36     Monocytes Absolute 06/21/2018 0 81     Eosinophils Absolute 06/21/2018 0 05     Basophils Absolute 06/21/2018 0 05     Sodium 06/21/2018 141     Potassium 06/21/2018 3 1*    Chloride 06/21/2018 102     CO2 06/21/2018 33*    Anion Gap 06/21/2018 6     BUN 06/21/2018 13     Creatinine 06/21/2018 1 36*    Glucose 06/21/2018 133     Calcium 06/21/2018 8 0*    AST 06/21/2018 64*    ALT 06/21/2018 58     Alkaline Phosphatase 06/21/2018 83     Total Protein 06/21/2018 7 2     Albumin 06/21/2018 3 2*    Total Bilirubin 06/21/2018 0 70     eGFR 06/21/2018 50     Troponin I 06/21/2018 <0 02     Protime 06/21/2018 17 4*    INR 06/21/2018 1 52*    Troponin I 06/22/2018 0 02     Sodium 06/22/2018 140     Potassium 06/22/2018 3 8     Chloride 06/22/2018 104     CO2 06/22/2018 28     Anion Gap 06/22/2018 8     BUN 06/22/2018 19     Creatinine 06/22/2018 1 40*    Glucose 06/22/2018 110     Calcium 06/22/2018 7 9*    eGFR 06/22/2018 48     WBC 06/22/2018 6 13     RBC 06/22/2018 3 15*    Hemoglobin 06/22/2018 12 4     Hematocrit 06/22/2018 36 1*    MCV 06/22/2018 115*    MCH 06/22/2018 39 4*    MCHC 06/22/2018 34 3     RDW 06/22/2018 13 3     Platelets 17/12/3501 163  MPV 06/22/2018 9 9     Protime 06/22/2018 20 4*    INR 06/22/2018 1 86*    TSH 3RD GENERATON 06/22/2018 3 888*    Ventricular Rate 06/21/2018 150     Atrial Rate 06/21/2018 120     QRSD Interval 06/21/2018 98     QT Interval 06/21/2018 332     QTC Interval 06/21/2018 524     QRS Axis 06/21/2018 56     T Wave Axis 06/21/2018 93        Imaging: I have personally reviewed pertinent reports  No results found  EKG: Personally reviewed by Francisco Carpio MD     Counseling / Coordination of Care  Total floor / unit time spent today 45 minutes  Greater than 50% of total time was spent with the patient and / or family counseling and / or coordination of care

## 2018-06-22 NOTE — CASE MANAGEMENT
Initial Clinical Review    Admission: Date/Time/Statement: 6/21/18 @ 1229     Orders Placed This Encounter   Procedures    Inpatient Admission (expected length of stay for this patient is greater than two midnights)     Standing Status:   Standing     Number of Occurrences:   1     Order Specific Question:   Admitting Physician     Answer:   Jeremiah Castillo [[id number]]     Order Specific Question:   Level of Care     Answer:   Level 1 Stepdown [13]     Order Specific Question:   Estimated length of stay     Answer:   More than 2 Midnights     Order Specific Question:   Certification     Answer:   I certify that inpatient services are medically necessary for this patient for a duration of greater than two midnights  See H&P and MD Progress Notes for additional information about the patient's course of treatment  ED: Date/Time/Mode of Arrival:   ED Arrival Information     Expected Arrival Acuity Means of Arrival Escorted By Service Admission Type    - 6/21/2018 10:39 Emergent Walk-In - General Medicine Emergency    Arrival Complaint    a fib      Chief Complaint:   Chief Complaint   Patient presents with    Atrial Fibrillation     Patient presents to the ER stating he has afib and it woke him up 1 hour ago  denies sob or chest pain  History of Illness:   Pt c/o rapid heartbeat; Hx of a  Fib in past, usually gets better with cardizem   Denies chest pain or SOB at present  ED Vital Signs:   ED Triage Vitals [06/21/18 1043]   Temperature Pulse Respirations Blood Pressure SpO2   98 1 °F (36 7 °C) (!) 131 20 123/88 97 %      Temp Source Heart Rate Source Patient Position - Orthostatic VS BP Location FiO2 (%)   Temporal Monitor Lying Right arm --      Pain Score       No Pain        Wt Readings from Last 1 Encounters:   06/22/18 100 kg (221 lb 1 9 oz)   Vital Signs (abnormal):     Abnormal Labs/Diagnostic Test Results:   K 3 1 CR 1 36 CA 8 0 AST 64 ALB 3 2 GFR 50 PT 17 4 INR 1 52   EKG=  Ventricular Rate  Atrial Rate     IL Interval ms    QRSD Interval ms 98    QT Interval ms 332    QTC Interval ms 524    P Axis degrees    QRS Axis degrees 56    T Wave Axis degrees 93      Atrial fibrillation with rapid ventricular response with premature ventricular or aberrantly conducted complexes  Nonspecific ST and T wave abnormality      ED Treatment:   Medication Administration from 06/21/2018 1039 to 06/21/2018 1651       Date/Time Order Dose Route Action Action by Comments     06/21/2018 1104 diltiazem (CARDIZEM) injection 15 mg 15 mg Intravenous Given Liss Higginbotham RN      06/21/2018 1118 diltiazem (CARDIZEM) 125 mg in sodium chloride 0 9 % 125 mL infusion 10 mg/hr Intravenous [address] Liss Higginbotham RN      06/21/2018 1413 sodium chloride 0 9 % infusion 0 mL/hr Intravenous Stopped Liss Higginbotham RN      06/21/2018 1106 sodium chloride 0 9 % infusion 1,000 mL/hr Intravenous [address] Liss Higginbotham RN      06/21/2018 1220 potassium chloride (K-DUR,KLOR-CON) CR tablet 20 mEq 20 mEq Oral Given Liss Higginbotham RN       Past Medical/Surgical History: Active Ambulatory Problems     Diagnosis Date Noted    No Active Ambulatory Problems     Resolved Ambulatory Problems     Diagnosis Date Noted    No Resolved Ambulatory Problems     Past Medical History:   Diagnosis Date    Atrial fibrillation (HCC)     BPH (benign prostatic hyperplasia)     CHF (congestive heart failure) (HCC)     GERD (gastroesophageal reflux disease)     Skin cancer     Sleep apnea    Admitting Diagnosis: Paroxysmal atrial fibrillation (HCC) [I48 0]  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]  Age/Sex: 69 y o  male  Assessment/Plan:   Paroxysmal atrial fibrillation [facility])   Assessment & Plan     Patient has symptomatic recurrent AFib last for about 3 hours now  Will admit patient to the step-down unit  Will control his heart rate with IV Cardizem drip and will increase his Lopressor to 25 mg q 6 hours    Will get an echocardiogram, ask Cardiology to see the patient who will likely stay in the hospital more than 2 midnights  Will increase warfarin dose is INR is subtherapeutic  Will give 7 5 mg of warfarin today    Will provide subcutaneous heparin for DVT prevention until the INR is above 2   Admission Orders:  LEVEL 1 STEPDOWN  PT EVAL & TX  CONSULT CARDIO  VENODYNES  Scheduled Meds:   Current Facility-Administered Medications:  acetaminophen 650 mg Oral Q6H PRN Constantino Siddiqui MD    diltiazem 1-15 mg/hr Intravenous Titrated Constantino Siddiqui MD Last Rate: Stopped (06/22/18 0139)   famotidine 20 mg Oral Daily Constantino Siddiqui MD    furosemide 80 mg Oral Daily Constantino Siddiqui MD    heparin (porcine) 5,000 Units Subcutaneous [facility] Constantino Siddiqui MD    metoprolol tartrate 12 5 mg Oral Q12H [address] Jessica Mai DO    ondansetron 4 mg Intravenous Q6H PRN Constantino Siddiqui MD    potassium chloride 40 mEq Oral BID Constantnio Siddiqui MD    tamsulosin 0 4 mg Oral Daily With Mary Andrade MD    warfarin 7 5 mg Oral Once (warfarin) Mp Farrell DO      Continuous Infusions:   diltiazem 1-15 mg/hr Last Rate: Stopped (06/22/18 0139)     PRN Meds:   acetaminophen    ondansetron

## 2018-06-22 NOTE — PROGRESS NOTES
Spoke to patient at length this morning after patient was "refusing heparin" and states he "doesn't need it because that's a blood thinner, and I was overdosed on that  I feel" Further explored patient's feelings  RN Supervisor Jimy Verdugo was also at bedside while patient said he didn't get much sleep because of his heart rate  Patient agreed to heparin shot and education was given to rationale for cardizem gtt, and heparin SQ ordered  Pt also updated that ECHO is ordered for him  Fresh water delivered  Needs met call bell within reach

## 2018-06-22 NOTE — PROGRESS NOTES
Pt's HR in 50s  Asymptomatic  Patient sleeping, wearing his home CPAP machine  Nina TOBIAS made aware  Trop drawn and sent  Cardizem gtt stopped  BP stable

## 2018-06-22 NOTE — PROGRESS NOTES
Progress Note - Pippa Montano 69 y o  male MRN: [id number]    Unit/Bed#: -02 Encounter: [id number]      Assessment:  Principal Problem:    Paroxysmal atrial fibrillation ([address] )  Active Problems:    Coronary artery disease involving native coronary artery of native heart without angina pectoris    Essential hypertension    S/P aortic valve replacement with bioprosthetic valve    Gastroesophageal reflux disease without esophagitis    Stage III chronic kidney disease    Benign prostatic hyperplasia without lower urinary tract symptoms  Resolved Problems:    Hypokalemia        Plan:  PAF-patient converted early this morning to normal sinus  and will place on b i d  Toprol dosing  He was somewhat bradycardic this morning so will decrease to 12 5 b i d   Patient reports his last episode of atrial fibrillation was while he was in [facility] 2 months ago where he needed to be cardioverted back into sinus rhythm  At that time it was discussed about considering for ablation and I will have patient see Cardiology to see to arrange follow-up with EP team    Matinicus Dire is on Coumadin INR still is low at 1 86 will give 7 5 mg again today he was on 5 mg alternating with 2 5 mg according to the listed home meds  Hypertension-tightly controlled was 105/77 on presentation to the ER  Chronic kidney disease stage 3-will have serial studies performed continue on low-dose Lasix  Coronary artery disease-no angina  Aortic valve replacement-bioprosthetic for echo today  GE reflux disorder-continue on meds   Subjective:   Patient without complaints of chest pain or shortness of breath  No lightheadedness or dizziness  No nausea    Patient reports chronic leg edema for which he does wear Alon stockings for years due to chronic varicose vein issues  ROS  Comprehensive system review negative other than noted above    Objective:     Vitals: Blood pressure 123/61, pulse 59, temperature 98 2 °F (36 8 °C), resp  rate 20, height 5' 10" (1 778 m), weight 100 kg (221 lb 1 9 oz), SpO2 98 %  ,Body mass index is 31 73 kg/m²    Current Facility-Administered Medications   Medication Dose Route Frequency Provider Last Rate Last Dose    acetaminophen (TYLENOL) tablet 650 mg  650 mg Oral Q6H PRN Krystyna Prescott MD        diltiazem (CARDIZEM) 125 mg in sodium chloride 0 9 % 125 mL infusion  1-15 mg/hr Intravenous Titrated Krystyna Prescott MD   Stopped at 06/22/18 0139    famotidine (PEPCID) tablet 20 mg  20 mg Oral Daily Krystyna Prescott MD   20 mg at 06/22/18 0811    furosemide (LASIX) tablet 80 mg  80 mg Oral Daily Krystyna Prescott MD   80 mg at 06/22/18 1433    heparin (porcine) subcutaneous injection 5,000 Units  5,000 Units Subcutaneous [facility] Krystyna Prescott MD   5,000 Units at 06/22/18 0616    metoprolol tartrate (LOPRESSOR) partial tablet 12 5 mg  12 5 mg Oral Q12H [facility] Jessica Mai DO        ondansetron (ZOFRAN) injection 4 mg  4 mg Intravenous Q6H PRN Krystyna Prescott MD        potassium chloride (K-DUR,KLOR-CON) CR tablet 40 mEq  40 mEq Oral BID Krystyna Prescott MD   40 mEq at 06/22/18 1010    tamsulosin (FLOMAX) capsule 0 4 mg  0 4 mg Oral Daily With Lety Tenorio MD   0 4 mg at 06/21/18 1740    warfarin (COUMADIN) tablet 7 5 mg  7 5 mg Oral Once (warfarin) Grace Kawasaki, DO         Prescriptions Prior to Admission   Medication    famotidine (PEPCID) 20 mg tablet    furosemide (LASIX) 80 mg tablet    metoprolol tartrate (LOPRESSOR) 25 mg tablet    potassium chloride (KLOR-CON M10) 10 mEq tablet    tamsulosin (FLOMAX) 0 4 mg    warfarin (COUMADIN) 5 mg tablet    traMADol (ULTRAM) 50 mg tablet    warfarin (COUMADIN) 2 5 mg tablet         Intake/Output Summary (Last 24 hours) at 06/22/18 1012  Last data filed at 06/22/18 0139   Gross per 24 hour   Intake             1103 ml   Output                0 ml   Net             1103 ml       Physical Exam:  General appearance: alert and oriented, in no acute distress  Neck: no adenopathy, no JVD, supple, symmetrical, trachea midline and thyroid not enlarged, symmetric, no tenderness/mass/nodules  Lungs: clear to auscultation bilaterally  Heart: regular rate and rhythm  Abdomen: soft, non-tender; bowel sounds normal; no masses,  no organomegaly  Extremities: edema Trace ankle edema bilaterally  Skin: Skin color, texture, turgor normal  No rashes or lesions  Neurologic: Grossly normal       Lab, Imaging and other studies: I have personally reviewed pertinent reports  Results from last 7 days  Lab Units 06/22/18  0433 06/21/18  1044   WBC Thousand/uL 6 13 5 97   HEMOGLOBIN g/dL 12 4 14 2   HEMATOCRIT % 36 1* 41 7   PLATELETS Thousands/uL 163 180   NEUTROS PCT %  --  44   LYMPHS PCT %  --  40   MONOS PCT %  --  14*   EOS PCT %  --  1       Results from last 7 days  Lab Units 06/22/18  0433 06/21/18  1044   SODIUM mmol/L 140 141   POTASSIUM mmol/L 3 8 3 1*   CHLORIDE mmol/L 104 102   CO2 mmol/L 28 33*   BUN mg/dL 19 13   CREATININE mg/dL 1 40* 1 36*   CALCIUM mg/dL 7 9* 8 0*   TOTAL PROTEIN g/dL  --  7 2   BILIRUBIN TOTAL mg/dL  --  0 70   ALK PHOS U/L  --  83   ALT U/L  --  58   AST U/L  --  64*   GLUCOSE RANDOM mg/dL 110 133     Lab Results   Component Value Date    TROPONINI 0 02 06/22/2018    TROPONINI <0 02 06/21/2018       Results from last 7 days  Lab Units 06/22/18  0433 06/21/18  1044   INR  1 86* 1 52*     No results found for: [place], [employer]    Imaging:  No results found for this or any previous visit  No results found for this or any previous visit  PATIENT CENTERED ROUNDS: I have performed rounds with the nursing staff            Marc Yeager DO

## 2018-06-22 NOTE — PHYSICAL THERAPY NOTE
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]    Problem List  Patient Active Problem List   Diagnosis    Paroxysmal atrial fibrillation ([facility] )    Coronary artery disease involving native coronary artery of native heart without angina pectoris    Essential hypertension    S/P aortic valve replacement with bioprosthetic valve    Benign prostatic hyperplasia without lower urinary tract symptoms    Gastroesophageal reflux disease without esophagitis    Stage III chronic kidney disease       Past Medical History  Past Medical History:   Diagnosis Date    Atrial fibrillation ([facility] )     BPH (benign prostatic hyperplasia)     CHF (congestive heart failure) (HCC)     GERD (gastroesophageal reflux disease)     Skin cancer     Sleep apnea        Past Surgical History  Past Surgical History:   Procedure Laterality Date    CARDIAC SURGERY      CHOLECYSTECTOMY      CORONARY ARTERY BYPASS GRAFT      CORONARY STENT PLACEMENT      TONSILLECTOMY      VALVE REPLACEMENT          06/22/18 1418   Note Type   Note type Eval only   Pain Assessment   Pain Assessment No/denies pain   Pain Score No Pain   Home Living   Type of Home House   Additional Comments Patient current lives in [place] during the winter months and has a summer home in [facility]      Prior Function   Level of [place] Independent with ADLs and functional mobility   Lives With Spouse   Receives Help From Family   ADL Assistance Independent   IADLs Independent   Falls in the last 6 months 0   Vocational Full time employment   Comments Pt drives and works full time; owns his own business    Restrictions/Precautions   [place] Precautions Per Order No  (up with assistance orders)   Other Precautions Multiple lines;Telemetry   General   Family/Caregiver Present Yes   Cognition   Overall Cognitive Status [facility] Arousal/Participation Alert   Orientation Level Oriented X4   Memory Within functional limits   Following Commands Follows all commands and directions without difficulty   Comments Pt is overall pleasant and cooperative  Able to express well beyond basic needs    RUE Assessment   RUE Assessment WFL   LUE Assessment   LUE Assessment WFL   RLE Assessment   RLE Assessment WFL   LLE Assessment   LLE Assessment WFL   Coordination   Movements are Fluid and Coordinated 1   Sensation WFL   Light Touch   RLE Light Touch Grossly intact   LLE Light Touch Grossly intact   Proprioception   RLE Proprioception Grossly intact   LLE Proprioception Grossly Intact   Bed Mobility   Rolling R 6  Modified independent   Rolling L 6  Modified independent   Supine to Sit 6  Modified independent   Sit to Supine 6  Modified independent   Transfers   Sit to Stand 7  Independent   Stand to Sit 7  Independent   Ambulation/Elevation   Gait pattern WNL   Gait Assistance 6  Modified independent   Additional items Verbal cues   Assistive Device None   Distance 200'   Stair Management Assistance 5  Supervision   Additional items Verbal cues   Stair Management Technique Two rails; Step to pattern; Foreward   Number of Stairs 4  ( steps )   Balance   Static Sitting Normal   Dynamic Sitting Good   Static Standing Good   Dynamic Standing Good   Ambulatory Fair +   Endurance Deficit   Endurance Deficit No   Activity Tolerance   Activity Tolerance Patient tolerated treatment well   Medical Staff Made Aware NISA Grimaldo    Nurse Made Aware appropriate to see per RN    Assessment   Prognosis Good   Assessment Patient is a 69year old male presenting with palpitations 2* a-fib  Pt with an additional problem list/PMH which includes a-fib, CAD, s/p AVR, CKD III, skin cancer, sleep apnea, and GERD  PT consulted to assess functional mobility and assist with safe d/c planning  PT eval performed with up with assistance orders in SDU on continuous monitoring  PTA, pt living at home, denies any falls and is fully (I)  On eval, pt able to perform all activity with no LOB or adverse reactions  Pt safe to d c home with family support when medically appropriate      Plan   PT Frequency One time visit   Recommendation   Recommendation Home with family support   PT - OK to Discharge Yes   Barthel Index   Feeding 10   Bathing 5   Grooming Score 5   Dressing Score 10   Bladder Score 10   Bowels Score 10   Toilet Use Score 10   Transfers (Bed/Chair) Score 15   Mobility (Level Surface) Score 15   Stairs Score 10   Barthel Index Score 100         Claudia North, PT

## 2018-06-28 ENCOUNTER — OFFICE VISIT (OUTPATIENT)
Dept: CARDIOLOGY CLINIC | Facility: CLINIC | Age: 78
End: 2018-06-28
Payer: MEDICARE

## 2018-06-28 VITALS
WEIGHT: 224 LBS | HEART RATE: 63 BPM | BODY MASS INDEX: 32.07 KG/M2 | SYSTOLIC BLOOD PRESSURE: 120 MMHG | DIASTOLIC BLOOD PRESSURE: 52 MMHG | HEIGHT: 70 IN

## 2018-06-28 DIAGNOSIS — I48.0 PAROXYSMAL ATRIAL FIBRILLATION (HCC): Primary | ICD-10-CM

## 2018-06-28 DIAGNOSIS — E87.6 HYPOKALEMIA: ICD-10-CM

## 2018-06-28 DIAGNOSIS — I48.91 ATRIAL FIBRILLATION, UNSPECIFIED TYPE (HCC): Primary | ICD-10-CM

## 2018-06-28 PROCEDURE — 93000 ELECTROCARDIOGRAM COMPLETE: CPT | Performed by: INTERNAL MEDICINE

## 2018-06-28 PROCEDURE — 99214 OFFICE O/P EST MOD 30 MIN: CPT | Performed by: INTERNAL MEDICINE

## 2018-06-28 RX ORDER — POTASSIUM CHLORIDE 20 MEQ/1
20 TABLET, EXTENDED RELEASE ORAL 2 TIMES DAILY
Qty: 60 TABLET | Refills: 0 | COMMUNITY
Start: 2018-06-28 | End: 2019-01-07 | Stop reason: SDUPTHER

## 2018-06-28 NOTE — PATIENT INSTRUCTIONS
1  Decrease Metoprolol tartrate to 12 5 mg twice a day  2  Start Multaq    3  See Dr Leisa Carpio regarding Atrial Fibrillation Ablation

## 2018-06-28 NOTE — TELEPHONE ENCOUNTER
Faby, Pharmacist at [facility] called stating medication was sent listing reason as Severe Chronic Heart Failure and the prior records show Heart Failure  Faby stated this medication would be an interaction  Spoke with Dr Arnie Parmar and he stated that patient does not have Severe Chronic Heart Failure  It was reiterated that the pharmacist stated patient had heart failure in their records and that it would be an interaction and that they just needed to inform the doctor so he was aware  Called pharmacist and informed them of this information

## 2018-06-28 NOTE — PROGRESS NOTES
Cardiology Follow Up    Elder Lester  1940  [id number]  [facility] Shawna Nolan  [address]  [address] [address]    1  Atrial fibrillation, unspecified type [facility])  POCT ECG       Interval History: Followup for Atrial Fibrillation  Admitted for atrial fibrillation last week  His symptom was palpitations  He was treated with IV cardizem and then converted to NSR  He has had no symptoms over the weekend  Problem List     Paroxysmal atrial fibrillation (HCC)    Coronary artery disease involving native coronary artery of native heart without angina pectoris    Essential hypertension    S/P aortic valve replacement with bioprosthetic valve    Benign prostatic hyperplasia without lower urinary tract symptoms    Gastroesophageal reflux disease without esophagitis    Stage III chronic kidney disease        Past Medical History:   Diagnosis Date    Atrial fibrillation (HCC)     BPH (benign prostatic hyperplasia)     CHF (congestive heart failure) (HCC)     GERD (gastroesophageal reflux disease)     Skin cancer     Sleep apnea      Social History     Social History    Marital status: /Civil Union     Spouse name: N/A    Number of children: N/A    Years of education: N/A     Occupational History    Not on file       Social History Main Topics    Smoking status: Never Smoker    Smokeless tobacco: Never Used    Alcohol use Yes    Drug use: No    Sexual activity: Not on file     Other Topics Concern    Not on file     Social History Narrative    No narrative on file      Family History   Problem Relation Age of Onset    Cancer Mother     Dementia Father      Past Surgical History:   Procedure Laterality Date    CARDIAC SURGERY      CHOLECYSTECTOMY      CORONARY ARTERY BYPASS GRAFT      CORONARY STENT PLACEMENT      TONSILLECTOMY      VALVE REPLACEMENT         Current Outpatient Prescriptions:    famotidine (PEPCID) 20 mg tablet, Take by mouth, Disp: , Rfl:     furosemide (LASIX) 80 mg tablet, Take 1 tablet (80 mg total) by mouth daily, Disp: , Rfl: 0    metoprolol tartrate (LOPRESSOR) 25 mg tablet, Take 25 mg by mouth every 12 (twelve) hours, Disp: , Rfl:     potassium chloride (K-DUR,KLOR-CON) 20 mEq tablet, Take 2 tablets (40 mEq total) by mouth 2 (two) times a day, Disp: 60 tablet, Rfl: 0    tamsulosin (FLOMAX) 0 4 mg, Take by mouth, Disp: , Rfl:     traMADol (ULTRAM) 50 mg tablet, Take by mouth, Disp: , Rfl:     warfarin (COUMADIN) 5 mg tablet, Daily at 5 mg - repeat PT/ inr on monday, Disp: , Rfl: 0    warfarin (COUMADIN) 7 5 mg tablet, Once tonight then 5 mg daily, Disp: , Rfl: 0  No Known Allergies    Labs:     Chemistry        Component Value Date/Time     06/22/2018 0433    K 3 8 06/22/2018 0433     06/22/2018 0433    CO2 28 06/22/2018 0433    BUN 19 06/22/2018 0433    CREATININE 1 40 (H) 06/22/2018 0433        Component Value Date/Time    CALCIUM 7 9 (L) 06/22/2018 0433    ALKPHOS 83 06/21/2018 1044    AST 64 (H) 06/21/2018 1044    ALT 58 06/21/2018 1044    BILITOT 0 70 06/21/2018 1044            No results found for: CHOL  No results found for: HDL  No results found for: LDLCALC  No results found for: TRIG  No components found for: CHOLHDL    Imaging: Xr Chest Pa & Lateral    Result Date: 6/27/2018  Narrative: CHEST INDICATION:   Atrial fibrillation, shortness of breath  COMPARISON:  Chest x-ray from 7/22/2010 EXAM PERFORMED/VIEWS:  XR CHEST PA & LATERAL  The frontal view was performed utilizing dual energy radiographic technique  FINDINGS:  There has been prior sternotomy and CABG  There is stable cardiomegaly  There is linear atelectasis in the left lower lung zone  Right hemidiaphragm is mildly elevated  No pneumothorax or pleural effusion  Osseous structures appear within normal limits for patient age  Impression: Prior sternotomy and CABG    Linear atelectasis in the left lower lung zone  Workstation performed: [id number]       EKG: NSR  With PACs  Review of Systems   Constitution: Negative  HENT: Negative  Eyes: Negative  Cardiovascular: Positive for palpitations  Respiratory: Negative  Endocrine: Negative  Hematologic/Lymphatic: Negative  Skin: Negative  Musculoskeletal: Negative  Gastrointestinal: Negative  Genitourinary: Negative  Neurological: Negative  Psychiatric/Behavioral: Negative  Allergic/Immunologic: Negative  Vitals:    06/28/18 0822   BP: 120/52   Pulse: 63           Physical Exam   Constitutional: He is oriented to person, place, and time  No distress  HENT:   Mouth/Throat: No oropharyngeal exudate  Eyes: No scleral icterus  Neck: No JVD present  Cardiovascular: Normal rate and regular rhythm  No murmur heard  Pulmonary/Chest: Effort normal and breath sounds normal  No respiratory distress  He has no wheezes  He has no rales  Abdominal: Soft  Bowel sounds are normal  He exhibits no distension  There is no tenderness  There is no rebound  Musculoskeletal: He exhibits no edema  Neurological: He is alert and oriented to person, place, and time  Skin: Skin is warm and dry  He is not diaphoretic  Psychiatric: His behavior is normal        Discussion/Summary:    1  Paroxysmal Atrial Fibrillation: He has had more symptomatic episodes this year  He has  A history of one prior cardioversion  He does have CAD and CKD3  AAT options are somewhat limited  I did discuss the options of AAT and also PVI  He is interested in both  I prescribed Multaq and set up an appointment with EP to discuss PVI  His sleep apnea is treated  2  CAD: Med Rx as above  3  Bio AVR: We reviewed his echo together  LVOT flow was increased  The mean gradient is unchanged in comparison to prior echo reports in [place]         The patient was counseled regarding diagnostic results, instructions for [facility] reductions, impressions  total time of encounter was 25 minutes and 15 minutes was spent counseling

## 2018-07-05 ENCOUNTER — LAB (OUTPATIENT)
Dept: LAB | Facility: HOSPITAL | Age: 78
End: 2018-07-05
Attending: INTERNAL MEDICINE
Payer: MEDICARE

## 2018-07-05 ENCOUNTER — OFFICE VISIT (OUTPATIENT)
Dept: FAMILY MEDICINE CLINIC | Facility: HOSPITAL | Age: 78
End: 2018-07-05
Payer: MEDICARE

## 2018-07-05 VITALS
BODY MASS INDEX: 32.35 KG/M2 | SYSTOLIC BLOOD PRESSURE: 120 MMHG | HEART RATE: 61 BPM | HEIGHT: 70 IN | DIASTOLIC BLOOD PRESSURE: 78 MMHG | OXYGEN SATURATION: 98 % | WEIGHT: 226 LBS

## 2018-07-05 DIAGNOSIS — Z95.3 S/P AORTIC VALVE REPLACEMENT WITH BIOPROSTHETIC VALVE: ICD-10-CM

## 2018-07-05 DIAGNOSIS — I10 ESSENTIAL HYPERTENSION: ICD-10-CM

## 2018-07-05 DIAGNOSIS — I48.0 PAROXYSMAL ATRIAL FIBRILLATION (HCC): ICD-10-CM

## 2018-07-05 DIAGNOSIS — I25.10 CORONARY ARTERY DISEASE INVOLVING NATIVE CORONARY ARTERY OF NATIVE HEART WITHOUT ANGINA PECTORIS: Primary | ICD-10-CM

## 2018-07-05 LAB
ALBUMIN SERPL BCP-MCNC: 3 G/DL (ref 3.5–5)
ALP SERPL-CCNC: 76 U/L (ref 46–116)
ALT SERPL W P-5'-P-CCNC: 62 U/L (ref 12–78)
ANION GAP SERPL CALCULATED.3IONS-SCNC: 4 MMOL/L (ref 4–13)
AST SERPL W P-5'-P-CCNC: 51 U/L (ref 5–45)
BILIRUB SERPL-MCNC: 0.6 MG/DL (ref 0.2–1)
BUN SERPL-MCNC: 14 MG/DL (ref 5–25)
CALCIUM SERPL-MCNC: 8.5 MG/DL (ref 8.3–10.1)
CHLORIDE SERPL-SCNC: 104 MMOL/L (ref 100–108)
CO2 SERPL-SCNC: 33 MMOL/L (ref 21–32)
CREAT SERPL-MCNC: 1.3 MG/DL (ref 0.6–1.3)
GFR SERPL CREATININE-BSD FRML MDRD: 53 ML/MIN/1.73SQ M
GLUCOSE P FAST SERPL-MCNC: 99 MG/DL (ref 65–99)
POTASSIUM SERPL-SCNC: 4.1 MMOL/L (ref 3.5–5.3)
PROT SERPL-MCNC: 6.7 G/DL (ref 6.4–8.2)
SODIUM SERPL-SCNC: 141 MMOL/L (ref 136–145)

## 2018-07-05 PROCEDURE — 99215 OFFICE O/P EST HI 40 MIN: CPT | Performed by: INTERNAL MEDICINE

## 2018-07-05 PROCEDURE — 80053 COMPREHEN METABOLIC PANEL: CPT

## 2018-07-05 PROCEDURE — 36415 COLL VENOUS BLD VENIPUNCTURE: CPT

## 2018-07-05 RX ORDER — AMIODARONE HYDROCHLORIDE 200 MG/1
200 TABLET ORAL DAILY
Qty: 30 TABLET | Refills: 5 | Status: ON HOLD | OUTPATIENT
Start: 2018-07-05 | End: 2018-08-24

## 2018-07-05 NOTE — PROGRESS NOTES
Assessment/Plan:       Diagnoses and all orders for this visit:    Coronary artery disease involving native coronary artery of native heart without angina pectoris    Essential hypertension  -     Comprehensive metabolic panel; Future  -     Basic metabolic panel; Future    S/P aortic valve replacement with bioprosthetic valve    Paroxysmal atrial fibrillation (HCC)  -     amiodarone 200 mg tablet; Take 1 tablet (200 mg total) by mouth daily          All of the above diagnoses have been assessed  Additional COMMENTS/PLAN: The patient hypokalemia in the hospital   Will repeat BMP today  I prescribed the amiodarone which will take when the Multaq is out  He does have an appointment for ablation  He tolerated the amiodarone well  I think his liver function abnormalities are not from that but rather was gallbladder disease this past winter  He will have repeat liver function test before he comes back and   If they are normal will actually restart the atorvastatin  Subjective:      Patient ID: Marycarmen Wu is a 69 y o  male  HPI     PAF-Patient was recently admitted the hospital with paroxysm fibrillation  He was placed on Multaq  Of note the patient was on amiodarone until the spring at which time it elevated liver function tests they were sure was not from that  He had his gallbladder taken out in [place] but was never placed back in anti rhythmic therapy  He is found that the cost is prohibitive for the Multaq and wants to go back on amiodarone  He does have an appointment coming up to discuss ablation  Monitors own INR-card in [place] changes    ASCVD-  Of note the patient was on a statin specifically atorvastatin until he had liver function abnormalities  This is been on hold  He denies any chest pain or shortness of breath  this is the 1st time I have met the patient  I reviewed his other history          The following portions of the patient's history were revised and updated as appropriate: Problem list, allergies, med list, FH, SH, Past medical and surgical histories  Current Outpatient Prescriptions   Medication Sig Dispense Refill    famotidine (PEPCID) 20 mg tablet Take by mouth      furosemide (LASIX) 80 mg tablet Take 1 tablet (80 mg total) by mouth daily  0    metoprolol tartrate (LOPRESSOR) 25 mg tablet Take 0 5 tablets (12 5 mg total) by mouth every 12 (twelve) hours  0    potassium chloride (K-DUR,KLOR-CON) 20 mEq tablet Take 1 tablet (20 mEq total) by mouth 2 (two) times a day 60 tablet 0    tamsulosin (FLOMAX) 0 4 mg Take by mouth      traMADol (ULTRAM) 50 mg tablet Take by mouth      warfarin (COUMADIN) 5 mg tablet Daily at 5 mg - repeat PT/ inr on monday  0    warfarin (COUMADIN) 7 5 mg tablet Once tonight then 5 mg daily  0    amiodarone 200 mg tablet Take 1 tablet (200 mg total) by mouth daily 30 tablet 5     No current facility-administered medications for this visit  Review of Systems   Constitutional: Negative  Respiratory: Negative  Cardiovascular: Negative  Gastrointestinal: Negative  Genitourinary: Negative  Objective:    /78 (BP Location: Left arm, Patient Position: Sitting, Cuff Size: Standard)   Pulse 61   Ht 5' 10" (1 778 m)   Wt 103 kg (226 lb)   SpO2 98%   BMI 32 43 kg/m²      Physical Exam   Constitutional: He appears well-developed and well-nourished  Neck: No thyromegaly present  Cardiovascular: Normal rate and regular rhythm  Murmur heard  Pulmonary/Chest: Effort normal and breath sounds normal    Musculoskeletal: He exhibits edema (  Plus two)  Vitals reviewed          Admission on 06/21/2018, Discharged on 06/22/2018   Component Date Value Ref Range Status    WBC 06/21/2018 5 97  4 31 - 10 16 Thousand/uL Final    RBC 06/21/2018 3 68* 3 88 - 5 62 Million/uL Final    Hemoglobin 06/21/2018 14 2  12 0 - 17 0 g/dL Final    Hematocrit 06/21/2018 41 7  36 5 - 49 3 % Final    MCV 06/21/2018 113* 82 - 98 fL Final    MCH 06/21/2018 38 6* 26 8 - 34 3 pg Final    MCHC 06/21/2018 34 1  31 4 - 37 4 g/dL Final    RDW 06/21/2018 13 0  11 6 - 15 1 % Final    MPV 06/21/2018 10 4  8 9 - 12 7 fL Final    Platelets 48/62/0792 180  149 - 390 Thousands/uL Final    nRBC 06/21/2018 0  /100 WBCs Final    Neutrophils Relative 06/21/2018 44  43 - 75 % Final    Immat GRANS % 06/21/2018 0  0 - 2 % Final    Lymphocytes Relative 06/21/2018 40  14 - 44 % Final    Monocytes Relative 06/21/2018 14* 4 - 12 % Final    Eosinophils Relative 06/21/2018 1  0 - 6 % Final    Basophils Relative 06/21/2018 1  0 - 1 % Final    Neutrophils Absolute 06/21/2018 2 68  1 85 - 7 62 Thousands/µL Final    Immature Grans Absolute 06/21/2018 0 02  0 00 - 0 20 Thousand/uL Final    Lymphocytes Absolute 06/21/2018 2 36  0 60 - 4 47 Thousands/µL Final    Monocytes Absolute 06/21/2018 0 81  0 17 - 1 22 Thousand/µL Final    Eosinophils Absolute 06/21/2018 0 05  0 00 - 0 61 Thousand/µL Final    Basophils Absolute 06/21/2018 0 05  0 00 - 0 10 Thousands/µL Final    Sodium 06/21/2018 141  136 - 145 mmol/L Final    Potassium 06/21/2018 3 1* 3 5 - 5 3 mmol/L Final    Chloride 06/21/2018 102  100 - 108 mmol/L Final    CO2 06/21/2018 33* 21 - 32 mmol/L Final    Anion Gap 06/21/2018 6  4 - 13 mmol/L Final    BUN 06/21/2018 13  5 - 25 mg/dL Final    Creatinine 06/21/2018 1 36* 0 60 - 1 30 mg/dL Final    Standardized to IDMS reference method    Glucose 06/21/2018 133  65 - 140 mg/dL Final      If the patient is fasting, the ADA then defines impaired fasting glucose as > 100 mg/dL and diabetes as > or equal to 123 mg/dL  Specimen collection should occur prior to Sulfasalazine administration due to the potential for falsely depressed results  Specimen collection should occur prior to Sulfapyridine administration due to the potential for falsely elevated results      Calcium 06/21/2018 8 0* 8 3 - 10 1 mg/dL Final    AST 06/21/2018 64* 5 - 45 U/L Final      Specimen collection should occur prior to Sulfasalazine administration due to the potential for falsely depressed results   ALT 06/21/2018 58  12 - 78 U/L Final      Specimen collection should occur prior to Sulfasalazine administration due to the potential for falsely depressed results   Alkaline Phosphatase 06/21/2018 83  46 - 116 U/L Final    Total Protein 06/21/2018 7 2  6 4 - 8 2 g/dL Final    Albumin 06/21/2018 3 2* 3 5 - 5 0 g/dL Final    Total Bilirubin 06/21/2018 0 70  0 20 - 1 00 mg/dL Final    eGFR 06/21/2018 50  ml/min/1 73sq m Final    Troponin I 06/21/2018 <0 02  <=0 04 ng/mL Final    3Autovalidation override  [employer] Chemistry analyzer 99% cutoff is > 0 04 ng/mL in network labs     o cTnI 99% cutoff is useful only when applied to patients in the clinical setting of myocardial ischemia   o cTnI 99% cutoff should be interpreted in the context of clinical history, ECG findings and possibly cardiac imaging to establish correct diagnosis  o cTnI 99% cutoff may be suggestive but clearly not indicative of a coronary event without the clinical setting of myocardial ischemia   Protime 06/21/2018 17 4* 11 8 - 14 2 seconds Final    INR 06/21/2018 1 52* 0 86 - 1 17 Final    Troponin I 06/22/2018 0 02  <=0 04 ng/mL Final    3Autovalidation override  [employer] Chemistry analyzer 99% cutoff is > 0 04 ng/mL in network labs     o cTnI 99% cutoff is useful only when applied to patients in the clinical setting of myocardial ischemia   o cTnI 99% cutoff should be interpreted in the context of clinical history, ECG findings and possibly cardiac imaging to establish correct diagnosis  o cTnI 99% cutoff may be suggestive but clearly not indicative of a coronary event without the clinical setting of myocardial ischemia        Sodium 06/22/2018 140  136 - 145 mmol/L Final    Potassium 06/22/2018 3 8  3 5 - 5 3 mmol/L Final    Chloride 06/22/2018 104  100 - 108 mmol/L Final    CO2 06/22/2018 28  21 - 32 mmol/L Final    Anion Gap 06/22/2018 8  4 - 13 mmol/L Final    BUN 06/22/2018 19  5 - 25 mg/dL Final    Creatinine 06/22/2018 1 40* 0 60 - 1 30 mg/dL Final    Standardized to IDMS reference method    Glucose 06/22/2018 110  65 - 140 mg/dL Final      If the patient is fasting, the ADA then defines impaired fasting glucose as > 100 mg/dL and diabetes as > or equal to 123 mg/dL  Specimen collection should occur prior to Sulfasalazine administration due to the potential for falsely depressed results  Specimen collection should occur prior to Sulfapyridine administration due to the potential for falsely elevated results      Calcium 06/22/2018 7 9* 8 3 - 10 1 mg/dL Final    eGFR 06/22/2018 48  ml/min/1 73sq m Final    WBC 06/22/2018 6 13  4 31 - 10 16 Thousand/uL Final    RBC 06/22/2018 3 15* 3 88 - 5 62 Million/uL Final    Hemoglobin 06/22/2018 12 4  12 0 - 17 0 g/dL Final    Hematocrit 06/22/2018 36 1* 36 5 - 49 3 % Final    MCV 06/22/2018 115* 82 - 98 fL Final    MCH 06/22/2018 39 4* 26 8 - 34 3 pg Final    MCHC 06/22/2018 34 3  31 4 - 37 4 g/dL Final    RDW 06/22/2018 13 3  11 6 - 15 1 % Final    Platelets 30/34/2959 163  149 - 390 Thousands/uL Final    MPV 06/22/2018 9 9  8 9 - 12 7 fL Final    Protime 06/22/2018 20 4* 11 8 - 14 2 seconds Final    INR 06/22/2018 1 86* 0 86 - 1 17 Final    TSH 3RD GENERATON 06/22/2018 3 888* 0 358 - 3 740 uIU/mL Final    Ventricular Rate 06/21/2018 150  BPM Final    Atrial Rate 06/21/2018 120  BPM Final    QRSD Interval 06/21/2018 98  ms Final    QT Interval 06/21/2018 332  ms Final    QTC Interval 06/21/2018 524  ms Final    QRS Axis 06/21/2018 56  degrees Final    T Wave Axis 06/21/2018 93  degrees Final         Ambrose Woods MD

## 2018-08-08 ENCOUNTER — OFFICE VISIT (OUTPATIENT)
Dept: CARDIOLOGY CLINIC | Facility: CLINIC | Age: 78
End: 2018-08-08
Payer: MEDICARE

## 2018-08-08 VITALS
DIASTOLIC BLOOD PRESSURE: 66 MMHG | WEIGHT: 229 LBS | BODY MASS INDEX: 32.78 KG/M2 | HEIGHT: 70 IN | HEART RATE: 50 BPM | SYSTOLIC BLOOD PRESSURE: 142 MMHG

## 2018-08-08 DIAGNOSIS — Z95.3 S/P AORTIC VALVE REPLACEMENT WITH BIOPROSTHETIC VALVE: ICD-10-CM

## 2018-08-08 DIAGNOSIS — Z79.01 ANTICOAGULATION ADEQUATE: ICD-10-CM

## 2018-08-08 DIAGNOSIS — I10 ESSENTIAL HYPERTENSION: ICD-10-CM

## 2018-08-08 DIAGNOSIS — I48.0 PAROXYSMAL ATRIAL FIBRILLATION (HCC): Primary | ICD-10-CM

## 2018-08-08 DIAGNOSIS — I48.91 ATRIAL FIBRILLATION, UNSPECIFIED TYPE (HCC): ICD-10-CM

## 2018-08-08 DIAGNOSIS — I25.10 CORONARY ARTERY DISEASE INVOLVING NATIVE CORONARY ARTERY OF NATIVE HEART WITHOUT ANGINA PECTORIS: ICD-10-CM

## 2018-08-08 PROCEDURE — 99205 OFFICE O/P NEW HI 60 MIN: CPT | Performed by: INTERNAL MEDICINE

## 2018-08-08 NOTE — PROGRESS NOTES
EPS Consultation     Rakan Azul  [id number]  1940  [facility] [place]  [facility] [place]  [address]    Stent 30 years ago  Afib more frequent in past 2 years  On amiodarone  Was on amiodarone but LFTs went up  Presented to [facility] with afib and June and was started on amiodarone  No episodes in past month  His afib episodes used to occur yearly now occurring every few months  He has had on cardioversion  He believes he may have been on sotalol in the past       Also had episode in [place]  Symptoms - heart racing no fatigue, no sob  1  Atrial fibrillation, unspecified type [facility])  Ambulatory referral to Cardiac Electrophysiology    POCT ECG     Patient Active Problem List   Diagnosis    Paroxysmal atrial fibrillation ([facility] )    Coronary artery disease involving native coronary artery of native heart without angina pectoris    Essential hypertension    S/P aortic valve replacement with bioprosthetic valve    Benign prostatic hyperplasia without lower urinary tract symptoms    Gastroesophageal reflux disease without esophagitis    Stage III chronic kidney disease     Past Medical History:   Diagnosis Date    Atrial fibrillation ([facility] )     BPH (benign prostatic hyperplasia)     CHF (congestive heart failure) (HCC)     GERD (gastroesophageal reflux disease)     Skin cancer     Sleep apnea      Social History     Social History    Marital status: /Civil Union     Spouse name: N/A    Number of children: N/A    Years of education: N/A     Occupational History    Not on file       Social History Main Topics    Smoking status: Never Smoker    Smokeless tobacco: Never Used    Alcohol use Yes    Drug use: No    Sexual activity: Not on file     Other Topics Concern    Not on file     Social History Narrative    No narrative on file      Family History   Problem Relation Age of Onset    Cancer Mother    Savannah Brennan Dementia Father      Past Surgical History:   Procedure Laterality Date    CARDIAC SURGERY      CHOLECYSTECTOMY      CORONARY ARTERY BYPASS GRAFT      CORONARY STENT PLACEMENT      TONSILLECTOMY      VALVE REPLACEMENT         Current Outpatient Prescriptions:     amiodarone 200 mg tablet, Take 1 tablet (200 mg total) by mouth daily, Disp: 30 tablet, Rfl: 5    famotidine (PEPCID) 20 mg tablet, Take by mouth, Disp: , Rfl:     furosemide (LASIX) 80 mg tablet, Take 1 tablet (80 mg total) by mouth daily, Disp: , Rfl: 0    metoprolol tartrate (LOPRESSOR) 25 mg tablet, Take 0 5 tablets (12 5 mg total) by mouth every 12 (twelve) hours, Disp: , Rfl: 0    potassium chloride (K-DUR,KLOR-CON) 20 mEq tablet, Take 1 tablet (20 mEq total) by mouth 2 (two) times a day, Disp: 60 tablet, Rfl: 0    Pyridoxine HCl (VITAMIN B6 PO), Take 120 mg by mouth daily, Disp: , Rfl:     tamsulosin (FLOMAX) 0 4 mg, Take by mouth, Disp: , Rfl:     traMADol (ULTRAM) 50 mg tablet, Take by mouth, Disp: , Rfl:     warfarin (COUMADIN) 5 mg tablet, Daily at 5 mg - repeat PT/ inr on monday, Disp: , Rfl: 0    warfarin (COUMADIN) 7 5 mg tablet, Once tonight then 5 mg daily (Patient taking differently: Take 2 5 mg by mouth Once daily or as directed ), Disp: , Rfl: 0  No Known Allergies  Vitals:    08/08/18 1549   BP: 142/66   BP Location: Right arm   Patient Position: Sitting   Cuff Size: Large   Pulse: (!) 50   Weight: 104 kg (229 lb)   Height: 5' 10" (1 778 m)       Labs:  Lab on 07/05/2018   Component Date Value    Sodium 07/05/2018 141     Potassium 07/05/2018 4 1     Chloride 07/05/2018 104     CO2 07/05/2018 33*    Anion Gap 07/05/2018 4     BUN 07/05/2018 14     Creatinine 07/05/2018 1 30     Glucose, Fasting 07/05/2018 99     Calcium 07/05/2018 8 5     AST 07/05/2018 51*    ALT 07/05/2018 62     Alkaline Phosphatase 07/05/2018 76     Total Protein 07/05/2018 6 7     Albumin 07/05/2018 3 0*    Total Bilirubin 07/05/2018 0 60     eGFR 07/05/2018 53    Admission on 06/21/2018, Discharged on 06/22/2018   Component Date Value    WBC 06/21/2018 5 97     RBC 06/21/2018 3 68*    Hemoglobin 06/21/2018 14 2     Hematocrit 06/21/2018 41 7     MCV 06/21/2018 113*    MCH 06/21/2018 38 6*    MCHC 06/21/2018 34 1     RDW 06/21/2018 13 0     MPV 06/21/2018 10 4     Platelets 45/72/2227 180     nRBC 06/21/2018 0     Neutrophils Relative 06/21/2018 44     Immat GRANS % 06/21/2018 0     Lymphocytes Relative 06/21/2018 40     Monocytes Relative 06/21/2018 14*    Eosinophils Relative 06/21/2018 1     Basophils Relative 06/21/2018 1     Neutrophils Absolute 06/21/2018 2 68     Immature Grans Absolute 06/21/2018 0 02     Lymphocytes Absolute 06/21/2018 2 36     Monocytes Absolute 06/21/2018 0 81     Eosinophils Absolute 06/21/2018 0 05     Basophils Absolute 06/21/2018 0 05     Sodium 06/21/2018 141     Potassium 06/21/2018 3 1*    Chloride 06/21/2018 102     CO2 06/21/2018 33*    Anion Gap 06/21/2018 6     BUN 06/21/2018 13     Creatinine 06/21/2018 1 36*    Glucose 06/21/2018 133     Calcium 06/21/2018 8 0*    AST 06/21/2018 64*    ALT 06/21/2018 58     Alkaline Phosphatase 06/21/2018 83     Total Protein 06/21/2018 7 2     Albumin 06/21/2018 3 2*    Total Bilirubin 06/21/2018 0 70     eGFR 06/21/2018 50     Troponin I 06/21/2018 <0 02     Protime 06/21/2018 17 4*    INR 06/21/2018 1 52*    Troponin I 06/22/2018 0 02     Sodium 06/22/2018 140     Potassium 06/22/2018 3 8     Chloride 06/22/2018 104     CO2 06/22/2018 28     Anion Gap 06/22/2018 8     BUN 06/22/2018 19     Creatinine 06/22/2018 1 40*    Glucose 06/22/2018 110     Calcium 06/22/2018 7 9*    eGFR 06/22/2018 48     WBC 06/22/2018 6 13     RBC 06/22/2018 3 15*    Hemoglobin 06/22/2018 12 4     Hematocrit 06/22/2018 36 1*    MCV 06/22/2018 115*    MCH 06/22/2018 39 4*    MCHC 06/22/2018 34 3     RDW 06/22/2018 13 3     Platelets 06/22/2018 163     MPV 06/22/2018 9 9     Protime 06/22/2018 20 4*    INR 06/22/2018 1 86*    TSH 3RD GENERATON 06/22/2018 3 888*    Ventricular Rate 06/21/2018 150     Atrial Rate 06/21/2018 120     QRSD Interval 06/21/2018 98     QT Interval 06/21/2018 332     QTC Interval 06/21/2018 524     QRS Axis 06/21/2018 56     T Wave Axis 06/21/2018 93      Imaging: No results found  Review of Systems:  Review of Systems  afib with palpitations, LE edema, ED, afib episodes getting closer together, no chest pain, excessino sob, all other 12 point ROS negative  Physical Exam:  Physical Exam     GEN: NAD, Alert and oriented, well appearing  HEENT:Head, neck, ears, oral pharynx: Mucus membranes moist, oral pharynx clear, nares clear  External ears normal  EYES: Pupils equal, sclera anicteric  NECK: No JVD, no carotid bruits  CARDIOVASCULAR: RRR, 2/6 NATALIA murmur, rub, gallops S1,S2  LUNGS: Clear To auscultation bilaterally, no rales, no wheezes, no rhonchi  ABDOMEN: Soft, nondistended, non-tender, bowel sounds present  EXTREMITIES/VASCULAR: No edema  Compression stockings  PSYCH: Normal Affect  NEURO: Grossly intact, moving all extremiteis equal, face symetric  HEME: No bleeding, bruising, petechia  SKIN: No significant rashes      Discussion/Summary:  Symptomatic paroxysmal atrial fibrillation and he also has a degree of sick sinus syndrome sotalol was not an option for him anymore he believes he was on that in the past   Discussed with patient long-term potential side effects of amiodarone from the iodine and he would prefer to get off of it  Dofetilide would be a reasonable choice for him but so would ablation    I explained in detail how catheter ablation is performed I showed him pictures I went through power point presentation about the nature of atrial fibrillation I did explain that approximately 30% of people require a 2nd procedure that risks include but are not limited to bleeding bruising the potential risk of pacemaker which he may need eventually anyway given his sick sinus syndrome small risk of stroke heart perforation damage to the esophagus and damage to the phrenic nerve  We will maintain him on warfarin during his procedure once he has his AFib ablation we will stop his amiodarone  CT scan of pulmonary veins ordered to assess anatomy      Previous AVR  appears to be working appropriately soft systolic murmur today     thank you for sending him to me in consultation arrangements will be made for AFib ablation

## 2018-08-08 NOTE — LETTER
August 12, 2018     Brittany Urena MD  [address]  [address] [place] [address]    Patient: Elvia Munroe   YOB: 1940   Date of Visit: 8/8/2018       Dear Dr Andra Montana: Thank you for referring Jovan Gates to me for evaluation  Below are my notes for this consultation  If you have questions, please do not hesitate to call me  I look forward to following your patient along with you  Sincerely,        Jazmin Mccray DO        CC: MD Jazmin Wang DO  8/12/2018  4:37 PM  Sign at close encounter                                           EPS Consultation     Elvia Munroe  [id number]  1940  [address]    Stent 30 years ago  Afib more frequent in past 2 years  On amiodarone  Was on amiodarone but LFTs went up  Presented to [place] [id number] in [facility] with afib and June and was started on amiodarone  No episodes in past month  His afib episodes used to occur yearly now occurring every few months  He has had on cardioversion  He believes he may have been on sotalol in the past       Also had episode in [place]  Symptoms - heart racing no fatigue, no sob      1  Atrial fibrillation, unspecified type [facility])  Ambulatory referral to Cardiac Electrophysiology    POCT ECG     Patient Active Problem List   Diagnosis    Paroxysmal atrial fibrillation ([address] )    Coronary artery disease involving native coronary artery of native heart without angina pectoris    Essential hypertension    S/P aortic valve replacement with bioprosthetic valve    Benign prostatic hyperplasia without lower urinary tract symptoms    Gastroesophageal reflux disease without esophagitis    Stage III chronic kidney disease     Past Medical History:   Diagnosis Date    Atrial fibrillation (HCC)     BPH (benign prostatic hyperplasia)     CHF (congestive heart failure) (HCC)     GERD (gastroesophageal reflux disease)     Skin cancer     Sleep apnea      Social History     Social History    Marital status: /Civil Union     Spouse name: N/A    Number of children: N/A    Years of education: N/A     Occupational History    Not on file       Social History Main Topics    Smoking status: Never Smoker    Smokeless tobacco: Never Used    Alcohol use Yes    Drug use: No    Sexual activity: Not on file     Other Topics Concern    Not on file     Social History Narrative    No narrative on file      Family History   Problem Relation Age of Onset    Cancer Mother     Dementia Father      Past Surgical History:   Procedure Laterality Date    CARDIAC SURGERY      CHOLECYSTECTOMY      CORONARY ARTERY BYPASS GRAFT      CORONARY STENT PLACEMENT      TONSILLECTOMY      VALVE REPLACEMENT         Current Outpatient Prescriptions:     amiodarone 200 mg tablet, Take 1 tablet (200 mg total) by mouth daily, Disp: 30 tablet, Rfl: 5    famotidine (PEPCID) 20 mg tablet, Take by mouth, Disp: , Rfl:     furosemide (LASIX) 80 mg tablet, Take 1 tablet (80 mg total) by mouth daily, Disp: , Rfl: 0    metoprolol tartrate (LOPRESSOR) 25 mg tablet, Take 0 5 tablets (12 5 mg total) by mouth every 12 (twelve) hours, Disp: , Rfl: 0    potassium chloride (K-DUR,KLOR-CON) 20 mEq tablet, Take 1 tablet (20 mEq total) by mouth 2 (two) times a day, Disp: 60 tablet, Rfl: 0    Pyridoxine HCl (VITAMIN B6 PO), Take 120 mg by mouth daily, Disp: , Rfl:     tamsulosin (FLOMAX) 0 4 mg, Take by mouth, Disp: , Rfl:     traMADol (ULTRAM) 50 mg tablet, Take by mouth, Disp: , Rfl:     warfarin (COUMADIN) 5 mg tablet, Daily at 5 mg - repeat PT/ inr on monday, Disp: , Rfl: 0    warfarin (COUMADIN) 7 5 mg tablet, Once tonight then 5 mg daily (Patient taking differently: Take 2 5 mg by mouth Once daily or as directed ), Disp: , Rfl: 0  No Known Allergies  Vitals:    08/08/18 1549   BP: 142/66   BP Location: Right arm   Patient Position: Sitting Cuff Size: Large   Pulse: (!) 50   Weight: 104 kg (229 lb)   Height: 5' 10" (1 778 m)       Labs:  Lab on 07/05/2018   Component Date Value    Sodium 07/05/2018 141     Potassium 07/05/2018 4 1     Chloride 07/05/2018 104     CO2 07/05/2018 33*    Anion Gap 07/05/2018 4     BUN 07/05/2018 14     Creatinine 07/05/2018 1 30     Glucose, Fasting 07/05/2018 99     Calcium 07/05/2018 8 5     AST 07/05/2018 51*    ALT 07/05/2018 62     Alkaline Phosphatase 07/05/2018 76     Total Protein 07/05/2018 6 7     Albumin 07/05/2018 3 0*    Total Bilirubin 07/05/2018 0 60     eGFR 07/05/2018 53    Admission on 06/21/2018, Discharged on 06/22/2018   Component Date Value    WBC 06/21/2018 5 97     RBC 06/21/2018 3 68*    Hemoglobin 06/21/2018 14 2     Hematocrit 06/21/2018 41 7     MCV 06/21/2018 113*    MCH 06/21/2018 38 6*    MCHC 06/21/2018 34 1     RDW 06/21/2018 13 0     MPV 06/21/2018 10 4     Platelets 04/41/5802 180     nRBC 06/21/2018 0     Neutrophils Relative 06/21/2018 44     Immat GRANS % 06/21/2018 0     Lymphocytes Relative 06/21/2018 40     Monocytes Relative 06/21/2018 14*    Eosinophils Relative 06/21/2018 1     Basophils Relative 06/21/2018 1     Neutrophils Absolute 06/21/2018 2 68     Immature Grans Absolute 06/21/2018 0 02     Lymphocytes Absolute 06/21/2018 2 36     Monocytes Absolute 06/21/2018 0 81     Eosinophils Absolute 06/21/2018 0 05     Basophils Absolute 06/21/2018 0 05     Sodium 06/21/2018 141     Potassium 06/21/2018 3 1*    Chloride 06/21/2018 102     CO2 06/21/2018 33*    Anion Gap 06/21/2018 6     BUN 06/21/2018 13     Creatinine 06/21/2018 1 36*    Glucose 06/21/2018 133     Calcium 06/21/2018 8 0*    AST 06/21/2018 64*    ALT 06/21/2018 58     Alkaline Phosphatase 06/21/2018 83     Total Protein 06/21/2018 7 2     Albumin 06/21/2018 3 2*    Total Bilirubin 06/21/2018 0 70     eGFR 06/21/2018 50     Troponin I 06/21/2018 <0 02     Protime 06/21/2018 17 4*    INR 06/21/2018 1 52*    Troponin I 06/22/2018 0 02     Sodium 06/22/2018 140     Potassium 06/22/2018 3 8     Chloride 06/22/2018 104     CO2 06/22/2018 28     Anion Gap 06/22/2018 8     BUN 06/22/2018 19     Creatinine 06/22/2018 1 40*    Glucose 06/22/2018 110     Calcium 06/22/2018 7 9*    eGFR 06/22/2018 48     WBC 06/22/2018 6 13     RBC 06/22/2018 3 15*    Hemoglobin 06/22/2018 12 4     Hematocrit 06/22/2018 36 1*    MCV 06/22/2018 115*    MCH 06/22/2018 39 4*    MCHC 06/22/2018 34 3     RDW 06/22/2018 13 3     Platelets 32/04/2318 163     MPV 06/22/2018 9 9     Protime 06/22/2018 20 4*    INR 06/22/2018 1 86*    TSH 3RD GENERATON 06/22/2018 3 888*    Ventricular Rate 06/21/2018 150     Atrial Rate 06/21/2018 120     QRSD Interval 06/21/2018 98     QT Interval 06/21/2018 332     QTC Interval 06/21/2018 524     QRS Axis 06/21/2018 56     T Wave Axis 06/21/2018 93      Imaging: No results found  Review of Systems:  Review of Systems  afib with palpitations, LE edema, ED, afib episodes getting closer together, no chest pain, excessino sob, all other 12 point ROS negative  Physical Exam:  Physical Exam     GEN: NAD, Alert and oriented, well appearing  HEENT:Head, neck, ears, oral pharynx: Mucus membranes moist, oral pharynx clear, nares clear  External ears normal  EYES: Pupils equal, sclera anicteric  NECK: No JVD, no carotid bruits  CARDIOVASCULAR: RRR, 2/6 NATALIA murmur, rub, gallops S1,S2  LUNGS: Clear To auscultation bilaterally, no rales, no wheezes, no rhonchi  ABDOMEN: Soft, nondistended, non-tender, bowel sounds present  EXTREMITIES/VASCULAR: No edema   Compression stockings  PSYCH: Normal Affect  NEURO: Grossly intact, moving all extremiteis equal, face symetric  HEME: No bleeding, bruising, petechia  SKIN: No significant rashes      Discussion/Summary:  Symptomatic paroxysmal atrial fibrillation and he also has a degree of sick sinus syndrome sotalol was not an option for him anymore he believes he was on that in the past   Discussed with patient long-term potential side effects of amiodarone from the iodine and he would prefer to get off of it  Dofetilide would be a reasonable choice for him but so would ablation  I explained in detail how catheter ablation is performed I showed him pictures I went through power point presentation about the nature of atrial fibrillation I did explain that approximately 30% of people require a 2nd procedure that risks include but are not limited to bleeding bruising the potential risk of pacemaker which he may need eventually anyway given his sick sinus syndrome small risk of stroke heart perforation damage to the esophagus and damage to the phrenic nerve  We will maintain him on warfarin during his procedure once he has his AFib ablation we will stop his amiodarone  CT scan of pulmonary veins ordered to assess anatomy      We will follow up in the near future thank you for sending him to me in consultation

## 2018-08-09 DIAGNOSIS — I48.91 ATRIAL FIBRILLATION, UNSPECIFIED TYPE (HCC): Primary | ICD-10-CM

## 2018-08-12 PROBLEM — Z79.01 ANTICOAGULATION ADEQUATE: Status: ACTIVE | Noted: 2018-08-12

## 2018-08-12 PROCEDURE — 93000 ELECTROCARDIOGRAM COMPLETE: CPT | Performed by: INTERNAL MEDICINE

## 2018-08-16 ENCOUNTER — HOSPITAL ENCOUNTER (OUTPATIENT)
Dept: CT IMAGING | Facility: HOSPITAL | Age: 78
Discharge: HOME | End: 2018-08-16
Attending: INTERNAL MEDICINE
Payer: MEDICARE

## 2018-08-16 DIAGNOSIS — I48.91 ATRIAL FIBRILLATION, UNSPECIFIED TYPE (HCC): ICD-10-CM

## 2018-08-16 PROCEDURE — 75572 CT HRT W/3D IMAGE: CPT

## 2018-08-16 RX ADMIN — IODIXANOL 100 ML: 320 INJECTION, SOLUTION INTRAVASCULAR at 13:37

## 2018-08-23 ENCOUNTER — TELEPHONE (OUTPATIENT)
Dept: INPATIENT UNIT | Facility: HOSPITAL | Age: 78
End: 2018-08-23

## 2018-08-23 RX ORDER — PANTOPRAZOLE SODIUM 40 MG/1
40 INJECTION, POWDER, FOR SOLUTION INTRAVENOUS ONCE
Status: CANCELLED | OUTPATIENT
Start: 2018-08-23 | End: 2018-08-23

## 2018-08-24 ENCOUNTER — ANESTHESIA (OUTPATIENT)
Dept: NON INVASIVE DIAGNOSTICS | Facility: HOSPITAL | Age: 78
End: 2018-08-24
Payer: MEDICARE

## 2018-08-24 ENCOUNTER — APPOINTMENT (OUTPATIENT)
Dept: NON INVASIVE DIAGNOSTICS | Facility: HOSPITAL | Age: 78
End: 2018-08-24
Attending: INTERNAL MEDICINE
Payer: MEDICARE

## 2018-08-24 ENCOUNTER — ANESTHESIA EVENT (OUTPATIENT)
Dept: NON INVASIVE DIAGNOSTICS | Facility: HOSPITAL | Age: 78
End: 2018-08-24
Payer: MEDICARE

## 2018-08-24 ENCOUNTER — HOSPITAL ENCOUNTER (OUTPATIENT)
Dept: NON INVASIVE DIAGNOSTICS | Facility: HOSPITAL | Age: 78
Discharge: HOME | End: 2018-08-25
Attending: INTERNAL MEDICINE | Admitting: INTERNAL MEDICINE
Payer: MEDICARE

## 2018-08-24 VITALS
WEIGHT: 229.28 LBS | RESPIRATION RATE: 18 BRPM | OXYGEN SATURATION: 96 % | HEART RATE: 59 BPM | HEIGHT: 70 IN | TEMPERATURE: 97.7 F | DIASTOLIC BLOOD PRESSURE: 65 MMHG | BODY MASS INDEX: 32.82 KG/M2 | SYSTOLIC BLOOD PRESSURE: 143 MMHG

## 2018-08-24 DIAGNOSIS — I48.91 ATRIAL FIBRILLATION, UNSPECIFIED TYPE (HCC): ICD-10-CM

## 2018-08-24 DIAGNOSIS — I48.0 PAROXYSMAL ATRIAL FIBRILLATION (HCC): ICD-10-CM

## 2018-08-24 LAB
ANION GAP SERPL CALCULATED.3IONS-SCNC: 6 MMOL/L (ref 4–13)
ATRIAL RATE: 62 BPM
BASOPHILS # BLD AUTO: 0.07 THOUSANDS/ΜL (ref 0–0.1)
BASOPHILS NFR BLD AUTO: 1 % (ref 0–1)
BUN SERPL-MCNC: 15 MG/DL (ref 5–25)
CALCIUM SERPL-MCNC: 8.5 MG/DL (ref 8.3–10.1)
CHLORIDE SERPL-SCNC: 104 MMOL/L (ref 100–108)
CO2 SERPL-SCNC: 30 MMOL/L (ref 21–32)
CREAT SERPL-MCNC: 1.29 MG/DL (ref 0.6–1.3)
EOSINOPHIL # BLD AUTO: 0.08 THOUSAND/ΜL (ref 0–0.61)
EOSINOPHIL NFR BLD AUTO: 2 % (ref 0–6)
ERYTHROCYTE [DISTWIDTH] IN BLOOD BY AUTOMATED COUNT: 12.2 % (ref 11.6–15.1)
GFR SERPL CREATININE-BSD FRML MDRD: 53 ML/MIN/1.73SQ M
GLUCOSE P FAST SERPL-MCNC: 102 MG/DL (ref 65–99)
GLUCOSE SERPL-MCNC: 102 MG/DL (ref 65–140)
HCT VFR BLD AUTO: 40.6 % (ref 36.5–49.3)
HGB BLD-MCNC: 13.6 G/DL (ref 12–17)
IMM GRANULOCYTES # BLD AUTO: 0.02 THOUSAND/UL (ref 0–0.2)
IMM GRANULOCYTES NFR BLD AUTO: 0 % (ref 0–2)
INR PPP: 2.06 (ref 0.86–1.17)
KCT BLD-ACNC: 230 SEC (ref 89–137)
KCT BLD-ACNC: 316 SEC (ref 89–137)
KCT BLD-ACNC: 361 SEC (ref 89–137)
KCT BLD-ACNC: 367 SEC (ref 89–137)
KCT BLD-ACNC: 386 SEC (ref 89–137)
LYMPHOCYTES # BLD AUTO: 1.75 THOUSANDS/ΜL (ref 0.6–4.47)
LYMPHOCYTES NFR BLD AUTO: 36 % (ref 14–44)
MCH RBC QN AUTO: 39.9 PG (ref 26.8–34.3)
MCHC RBC AUTO-ENTMCNC: 33.5 G/DL (ref 31.4–37.4)
MCV RBC AUTO: 119 FL (ref 82–98)
MONOCYTES # BLD AUTO: 0.72 THOUSAND/ΜL (ref 0.17–1.22)
MONOCYTES NFR BLD AUTO: 15 % (ref 4–12)
NEUTROPHILS # BLD AUTO: 2.29 THOUSANDS/ΜL (ref 1.85–7.62)
NEUTS SEG NFR BLD AUTO: 46 % (ref 43–75)
NRBC BLD AUTO-RTO: 0 /100 WBCS
P AXIS: 76 DEGREES
PLATELET # BLD AUTO: 188 THOUSANDS/UL (ref 149–390)
PMV BLD AUTO: 10 FL (ref 8.9–12.7)
POTASSIUM SERPL-SCNC: 3.7 MMOL/L (ref 3.5–5.3)
PR INTERVAL: 186 MS
PROTHROMBIN TIME: 23.3 SECONDS (ref 11.8–14.2)
QRS AXIS: 47 DEGREES
QRSD INTERVAL: 108 MS
QT INTERVAL: 488 MS
QTC INTERVAL: 495 MS
RBC # BLD AUTO: 3.41 MILLION/UL (ref 3.88–5.62)
SODIUM SERPL-SCNC: 140 MMOL/L (ref 136–145)
SPECIMEN SOURCE: ABNORMAL
T WAVE AXIS: 78 DEGREES
VENTRICULAR RATE: 62 BPM
WBC # BLD AUTO: 4.93 THOUSAND/UL (ref 4.31–10.16)

## 2018-08-24 PROCEDURE — C1893 INTRO/SHEATH, FIXED,NON-PEEL: HCPCS | Performed by: INTERNAL MEDICINE

## 2018-08-24 PROCEDURE — 93662 INTRACARDIAC ECG (ICE): CPT | Performed by: INTERNAL MEDICINE

## 2018-08-24 PROCEDURE — 80048 BASIC METABOLIC PNL TOTAL CA: CPT | Performed by: PHYSICIAN ASSISTANT

## 2018-08-24 PROCEDURE — 93655 ICAR CATH ABLTJ DSCRT ARRHYT: CPT | Performed by: INTERNAL MEDICINE

## 2018-08-24 PROCEDURE — C1726 CATH, BAL DIL, NON-VASCULAR: HCPCS | Performed by: INTERNAL MEDICINE

## 2018-08-24 PROCEDURE — 93613 INTRACARDIAC EPHYS 3D MAPG: CPT | Performed by: INTERNAL MEDICINE

## 2018-08-24 PROCEDURE — C1730 CATH, EP, 19 OR FEW ELECT: HCPCS | Performed by: INTERNAL MEDICINE

## 2018-08-24 PROCEDURE — C1759 CATH, INTRA ECHOCARDIOGRAPHY: HCPCS | Performed by: INTERNAL MEDICINE

## 2018-08-24 PROCEDURE — C1894 INTRO/SHEATH, NON-LASER: HCPCS | Performed by: INTERNAL MEDICINE

## 2018-08-24 PROCEDURE — 85610 PROTHROMBIN TIME: CPT | Performed by: PHYSICIAN ASSISTANT

## 2018-08-24 PROCEDURE — C1769 GUIDE WIRE: HCPCS | Performed by: INTERNAL MEDICINE

## 2018-08-24 PROCEDURE — 85025 COMPLETE CBC W/AUTO DIFF WBC: CPT | Performed by: PHYSICIAN ASSISTANT

## 2018-08-24 PROCEDURE — C9113 INJ PANTOPRAZOLE SODIUM, VIA: HCPCS | Performed by: PHYSICIAN ASSISTANT

## 2018-08-24 PROCEDURE — 93656 COMPRE EP EVAL ABLTJ ATR FIB: CPT | Performed by: INTERNAL MEDICINE

## 2018-08-24 PROCEDURE — 93005 ELECTROCARDIOGRAM TRACING: CPT

## 2018-08-24 PROCEDURE — C1733 CATH, EP, OTHR THAN COOL-TIP: HCPCS | Performed by: INTERNAL MEDICINE

## 2018-08-24 PROCEDURE — 93653 COMPRE EP EVAL TX SVT: CPT | Performed by: INTERNAL MEDICINE

## 2018-08-24 PROCEDURE — 93462 L HRT CATH TRNSPTL PUNCTURE: CPT | Performed by: INTERNAL MEDICINE

## 2018-08-24 PROCEDURE — 93621 COMP EP EVL L PAC&REC C SINS: CPT | Performed by: INTERNAL MEDICINE

## 2018-08-24 PROCEDURE — 93312 ECHO TRANSESOPHAGEAL: CPT

## 2018-08-24 PROCEDURE — 93010 ELECTROCARDIOGRAM REPORT: CPT | Performed by: INTERNAL MEDICINE

## 2018-08-24 PROCEDURE — 85347 COAGULATION TIME ACTIVATED: CPT

## 2018-08-24 RX ORDER — HYDRALAZINE HYDROCHLORIDE 20 MG/ML
10 INJECTION INTRAMUSCULAR; INTRAVENOUS ONCE
Status: COMPLETED | OUTPATIENT
Start: 2018-08-24 | End: 2018-08-24

## 2018-08-24 RX ORDER — LANOLIN ALCOHOL/MO/W.PET/CERES
3 CREAM (GRAM) TOPICAL
Status: DISCONTINUED | OUTPATIENT
Start: 2018-08-24 | End: 2018-08-25 | Stop reason: HOSPADM

## 2018-08-24 RX ORDER — TAMSULOSIN HYDROCHLORIDE 0.4 MG/1
0.4 CAPSULE ORAL
Status: DISCONTINUED | OUTPATIENT
Start: 2018-08-24 | End: 2018-08-25 | Stop reason: HOSPADM

## 2018-08-24 RX ORDER — POTASSIUM CHLORIDE 20 MEQ/1
20 TABLET, EXTENDED RELEASE ORAL 2 TIMES DAILY
Status: DISCONTINUED | OUTPATIENT
Start: 2018-08-24 | End: 2018-08-25 | Stop reason: HOSPADM

## 2018-08-24 RX ORDER — PROPOFOL 10 MG/ML
INJECTION, EMULSION INTRAVENOUS AS NEEDED
Status: DISCONTINUED | OUTPATIENT
Start: 2018-08-24 | End: 2018-08-24 | Stop reason: SURG

## 2018-08-24 RX ORDER — SODIUM CHLORIDE 9 MG/ML
INJECTION, SOLUTION INTRAVENOUS CONTINUOUS PRN
Status: DISCONTINUED | OUTPATIENT
Start: 2018-08-24 | End: 2018-08-24 | Stop reason: SURG

## 2018-08-24 RX ORDER — EPHEDRINE SULFATE 50 MG/ML
INJECTION, SOLUTION INTRAVENOUS AS NEEDED
Status: DISCONTINUED | OUTPATIENT
Start: 2018-08-24 | End: 2018-08-24 | Stop reason: SURG

## 2018-08-24 RX ORDER — AMIODARONE HYDROCHLORIDE 200 MG/1
200 TABLET ORAL DAILY
Status: CANCELLED | OUTPATIENT
Start: 2018-08-24

## 2018-08-24 RX ORDER — WARFARIN SODIUM 7.5 MG/1
7.5 TABLET ORAL
Status: DISCONTINUED | OUTPATIENT
Start: 2018-08-24 | End: 2018-08-24

## 2018-08-24 RX ORDER — MIDAZOLAM HYDROCHLORIDE 1 MG/ML
INJECTION INTRAMUSCULAR; INTRAVENOUS AS NEEDED
Status: DISCONTINUED | OUTPATIENT
Start: 2018-08-24 | End: 2018-08-24 | Stop reason: SURG

## 2018-08-24 RX ORDER — FENTANYL CITRATE 50 UG/ML
INJECTION, SOLUTION INTRAMUSCULAR; INTRAVENOUS AS NEEDED
Status: DISCONTINUED | OUTPATIENT
Start: 2018-08-24 | End: 2018-08-24 | Stop reason: SURG

## 2018-08-24 RX ORDER — PROTAMINE SULFATE 10 MG/ML
INJECTION, SOLUTION INTRAVENOUS AS NEEDED
Status: DISCONTINUED | OUTPATIENT
Start: 2018-08-24 | End: 2018-08-24 | Stop reason: SURG

## 2018-08-24 RX ORDER — WARFARIN SODIUM 5 MG/1
5 TABLET ORAL
Status: DISCONTINUED | OUTPATIENT
Start: 2018-08-24 | End: 2018-08-24

## 2018-08-24 RX ORDER — CEFAZOLIN SODIUM 1 G/3ML
INJECTION, POWDER, FOR SOLUTION INTRAMUSCULAR; INTRAVENOUS AS NEEDED
Status: DISCONTINUED | OUTPATIENT
Start: 2018-08-24 | End: 2018-08-24 | Stop reason: SURG

## 2018-08-24 RX ORDER — GLYCOPYRROLATE 0.2 MG/ML
INJECTION INTRAMUSCULAR; INTRAVENOUS AS NEEDED
Status: DISCONTINUED | OUTPATIENT
Start: 2018-08-24 | End: 2018-08-24 | Stop reason: SURG

## 2018-08-24 RX ORDER — FENTANYL CITRATE/PF 50 MCG/ML
25 SYRINGE (ML) INJECTION
Status: DISCONTINUED | OUTPATIENT
Start: 2018-08-24 | End: 2018-08-24 | Stop reason: HOSPADM

## 2018-08-24 RX ORDER — ONDANSETRON 2 MG/ML
INJECTION INTRAMUSCULAR; INTRAVENOUS AS NEEDED
Status: DISCONTINUED | OUTPATIENT
Start: 2018-08-24 | End: 2018-08-24 | Stop reason: SURG

## 2018-08-24 RX ORDER — PANTOPRAZOLE SODIUM 40 MG/1
40 TABLET, DELAYED RELEASE ORAL
Status: DISCONTINUED | OUTPATIENT
Start: 2018-08-25 | End: 2018-08-25 | Stop reason: HOSPADM

## 2018-08-24 RX ORDER — HEPARIN SODIUM 1000 [USP'U]/ML
INJECTION, SOLUTION INTRAVENOUS; SUBCUTANEOUS CODE/TRAUMA/SEDATION MEDICATION
Status: COMPLETED | OUTPATIENT
Start: 2018-08-24 | End: 2018-08-24

## 2018-08-24 RX ORDER — ACETAMINOPHEN 325 MG/1
650 TABLET ORAL EVERY 4 HOURS PRN
Status: DISCONTINUED | OUTPATIENT
Start: 2018-08-24 | End: 2018-08-25 | Stop reason: HOSPADM

## 2018-08-24 RX ORDER — DOCUSATE SODIUM 100 MG/1
100 CAPSULE, LIQUID FILLED ORAL 2 TIMES DAILY PRN
Status: DISCONTINUED | OUTPATIENT
Start: 2018-08-24 | End: 2018-08-25 | Stop reason: HOSPADM

## 2018-08-24 RX ORDER — HEPARIN SODIUM 10000 [USP'U]/100ML
INJECTION, SOLUTION INTRAVENOUS
Status: COMPLETED | OUTPATIENT
Start: 2018-08-24 | End: 2018-08-24

## 2018-08-24 RX ORDER — HYDRALAZINE HYDROCHLORIDE 20 MG/ML
5 INJECTION INTRAMUSCULAR; INTRAVENOUS ONCE
Status: COMPLETED | OUTPATIENT
Start: 2018-08-24 | End: 2018-08-24

## 2018-08-24 RX ORDER — FUROSEMIDE 80 MG
80 TABLET ORAL DAILY
Status: DISCONTINUED | OUTPATIENT
Start: 2018-08-25 | End: 2018-08-25 | Stop reason: HOSPADM

## 2018-08-24 RX ORDER — WARFARIN SODIUM 1 MG/1
3 TABLET ORAL
Status: DISCONTINUED | OUTPATIENT
Start: 2018-08-24 | End: 2018-08-25 | Stop reason: HOSPADM

## 2018-08-24 RX ORDER — ONDANSETRON 2 MG/ML
4 INJECTION INTRAMUSCULAR; INTRAVENOUS ONCE AS NEEDED
Status: DISCONTINUED | OUTPATIENT
Start: 2018-08-24 | End: 2018-08-24 | Stop reason: HOSPADM

## 2018-08-24 RX ORDER — PANTOPRAZOLE SODIUM 40 MG/1
40 INJECTION, POWDER, FOR SOLUTION INTRAVENOUS ONCE
Status: COMPLETED | OUTPATIENT
Start: 2018-08-24 | End: 2018-08-24

## 2018-08-24 RX ADMIN — GLYCOPYRROLATE 0.1 MG: 0.2 INJECTION, SOLUTION INTRAMUSCULAR; INTRAVENOUS at 08:15

## 2018-08-24 RX ADMIN — IOHEXOL 50 ML: 350 INJECTION, SOLUTION INTRAVENOUS at 11:17

## 2018-08-24 RX ADMIN — EPHEDRINE SULFATE 10 MG: 50 INJECTION, SOLUTION INTRAMUSCULAR; INTRAVENOUS; SUBCUTANEOUS at 08:43

## 2018-08-24 RX ADMIN — PROPOFOL 50 MG: 10 INJECTION, EMULSION INTRAVENOUS at 09:32

## 2018-08-24 RX ADMIN — NOREPINEPHRINE BITARTRATE 6 MCG/MIN: 1 INJECTION, SOLUTION, CONCENTRATE INTRAVENOUS at 10:00

## 2018-08-24 RX ADMIN — TAMSULOSIN HYDROCHLORIDE 0.4 MG: 0.4 CAPSULE ORAL at 20:15

## 2018-08-24 RX ADMIN — PROTAMINE SULFATE 5 MG: 10 INJECTION, SOLUTION INTRAVENOUS at 11:10

## 2018-08-24 RX ADMIN — CEFAZOLIN 2000 MG: 1 INJECTION, POWDER, FOR SOLUTION INTRAVENOUS at 08:28

## 2018-08-24 RX ADMIN — POTASSIUM CHLORIDE 20 MEQ: 1500 TABLET, EXTENDED RELEASE ORAL at 20:15

## 2018-08-24 RX ADMIN — HYDRALAZINE HYDROCHLORIDE 10 MG: 20 INJECTION INTRAMUSCULAR; INTRAVENOUS at 13:08

## 2018-08-24 RX ADMIN — EPHEDRINE SULFATE 5 MG: 50 INJECTION, SOLUTION INTRAMUSCULAR; INTRAVENOUS; SUBCUTANEOUS at 10:10

## 2018-08-24 RX ADMIN — EPHEDRINE SULFATE 5 MG: 50 INJECTION, SOLUTION INTRAMUSCULAR; INTRAVENOUS; SUBCUTANEOUS at 10:05

## 2018-08-24 RX ADMIN — HEPARIN SODIUM 15000 UNITS: 1000 INJECTION INTRAVENOUS; SUBCUTANEOUS at 09:13

## 2018-08-24 RX ADMIN — FENTANYL CITRATE 50 MCG: 50 INJECTION, SOLUTION INTRAMUSCULAR; INTRAVENOUS at 08:32

## 2018-08-24 RX ADMIN — SODIUM CHLORIDE: 0.9 INJECTION, SOLUTION INTRAVENOUS at 08:11

## 2018-08-24 RX ADMIN — HYDRALAZINE HYDROCHLORIDE 5 MG: 20 INJECTION INTRAMUSCULAR; INTRAVENOUS at 12:54

## 2018-08-24 RX ADMIN — HEPARIN SODIUM 1300 UNITS/HR: 10000 INJECTION, SOLUTION INTRAVENOUS at 09:13

## 2018-08-24 RX ADMIN — PROTAMINE SULFATE 10 MG: 10 INJECTION, SOLUTION INTRAVENOUS at 11:40

## 2018-08-24 RX ADMIN — NOREPINEPHRINE BITARTRATE 3 MCG/MIN: 1 INJECTION, SOLUTION, CONCENTRATE INTRAVENOUS at 09:21

## 2018-08-24 RX ADMIN — PANTOPRAZOLE SODIUM 40 MG: 40 INJECTION, POWDER, FOR SOLUTION INTRAVENOUS at 08:30

## 2018-08-24 RX ADMIN — PROTAMINE SULFATE 35 MG: 10 INJECTION, SOLUTION INTRAVENOUS at 11:15

## 2018-08-24 RX ADMIN — HEPARIN SODIUM 2500 UNITS: 1000 INJECTION INTRAVENOUS; SUBCUTANEOUS at 10:26

## 2018-08-24 RX ADMIN — FENTANYL CITRATE 50 MCG: 50 INJECTION, SOLUTION INTRAMUSCULAR; INTRAVENOUS at 08:39

## 2018-08-24 RX ADMIN — FENTANYL CITRATE 25 MCG: 50 INJECTION INTRAMUSCULAR; INTRAVENOUS at 12:34

## 2018-08-24 RX ADMIN — ONDANSETRON 4 MG: 2 INJECTION INTRAMUSCULAR; INTRAVENOUS at 08:15

## 2018-08-24 RX ADMIN — EPHEDRINE SULFATE 10 MG: 50 INJECTION, SOLUTION INTRAMUSCULAR; INTRAVENOUS; SUBCUTANEOUS at 09:01

## 2018-08-24 RX ADMIN — HEPARIN SODIUM 5000 UNITS: 1000 INJECTION INTRAVENOUS; SUBCUTANEOUS at 09:37

## 2018-08-24 RX ADMIN — MIDAZOLAM 2 MG: 1 INJECTION INTRAMUSCULAR; INTRAVENOUS at 08:15

## 2018-08-24 NOTE — H&P
ADDENDUM: no changes from below  Presents for afib ablation  EPS Consultation      Elder Lester  [id number]  1940  [facility]  [facility] [address] [facility]  [address]     Stent 30 years ago  Afib more frequent in past 2 years  On amiodarone  Was on amiodarone but LFTs went up  Presented to [facility]  in [facility] with afib and June and was started on amiodarone  No episodes in past month  His afib episodes used to occur yearly now occurring every few months  He has had on cardioversion  He believes he may have been on sotalol in the past        Also had episode in [place]      Symptoms - heart racing no fatigue, no sob      1  Atrial fibrillation, unspecified type [facility])  Ambulatory referral to Cardiac Electrophysiology     POCT ECG          Patient Active Problem List   Diagnosis    Paroxysmal atrial fibrillation ([facility] [address] )    Coronary artery disease involving native coronary artery of native heart without angina pectoris    Essential hypertension    S/P aortic valve replacement with bioprosthetic valve    Benign prostatic hyperplasia without lower urinary tract symptoms    Gastroesophageal reflux disease without esophagitis    Stage III chronic kidney disease      Medical History        Past Medical History:   Diagnosis Date    Atrial fibrillation (HCC)      BPH (benign prostatic hyperplasia)      CHF (congestive heart failure) (HCC)      GERD (gastroesophageal reflux disease)      Skin cancer      Sleep apnea           Social History   Social History            Social History    Marital status: /Civil Union       Spouse name: N/A    Number of children: N/A    Years of education: N/A          Occupational History    Not on file       Social History Main Topics    Smoking status: Never Smoker    Smokeless tobacco: Never Used    Alcohol use Yes    Drug use: No    Sexual activity: Not on file           Other Topics Concern  Not on file          Social History Narrative    No narrative on file               Family History   Problem Relation Age of Onset    Cancer Mother      Dementia Father        Surgical History         Past Surgical History:   Procedure Laterality Date    CARDIAC SURGERY        CHOLECYSTECTOMY        CORONARY ARTERY BYPASS GRAFT        CORONARY STENT PLACEMENT        TONSILLECTOMY        VALVE REPLACEMENT                Current Outpatient Prescriptions:     amiodarone 200 mg tablet, Take 1 tablet (200 mg total) by mouth daily, Disp: 30 tablet, Rfl: 5    famotidine (PEPCID) 20 mg tablet, Take by mouth, Disp: , Rfl:     furosemide (LASIX) 80 mg tablet, Take 1 tablet (80 mg total) by mouth daily, Disp: , Rfl: 0    metoprolol tartrate (LOPRESSOR) 25 mg tablet, Take 0 5 tablets (12 5 mg total) by mouth every 12 (twelve) hours, Disp: , Rfl: 0    potassium chloride (K-DUR,KLOR-CON) 20 mEq tablet, Take 1 tablet (20 mEq total) by mouth 2 (two) times a day, Disp: 60 tablet, Rfl: 0    Pyridoxine HCl (VITAMIN B6 PO), Take 120 mg by mouth daily, Disp: , Rfl:     tamsulosin (FLOMAX) 0 4 mg, Take by mouth, Disp: , Rfl:     traMADol (ULTRAM) 50 mg tablet, Take by mouth, Disp: , Rfl:     warfarin (COUMADIN) 5 mg tablet, Daily at 5 mg - repeat PT/ inr on monday, Disp: , Rfl: 0    warfarin (COUMADIN) 7 5 mg tablet, Once tonight then 5 mg daily (Patient taking differently: Take 2 5 mg by mouth Once daily or as directed ), Disp: , Rfl: 0  No Known Allergies  Vitals       Vitals:     08/08/18 1549   BP: 142/66   BP Location: Right arm   Patient Position: Sitting   Cuff Size: Large   Pulse: (!) 50   Weight: 104 kg (229 lb)   Height: 5' 10" (1 778 m)           Labs:        Lab on 07/05/2018   Component Date Value    Sodium 07/05/2018 141     Potassium 07/05/2018 4 1     Chloride 07/05/2018 104     CO2 07/05/2018 33*    Anion Gap 07/05/2018 4     BUN 07/05/2018 14     Creatinine 07/05/2018 1 30     Glucose, Fasting 07/05/2018 99     Calcium 07/05/2018 8 5     AST 07/05/2018 51*    ALT 07/05/2018 62     Alkaline Phosphatase 07/05/2018 76     Total Protein 07/05/2018 6 7     Albumin 07/05/2018 3 0*    Total Bilirubin 07/05/2018 0 60     eGFR 07/05/2018 53    Admission on 06/21/2018, Discharged on 06/22/2018   Component Date Value    WBC 06/21/2018 5 97     RBC 06/21/2018 3 68*    Hemoglobin 06/21/2018 14 2     Hematocrit 06/21/2018 41 7     MCV 06/21/2018 113*    MCH 06/21/2018 38 6*    MCHC 06/21/2018 34 1     RDW 06/21/2018 13 0     MPV 06/21/2018 10 4     Platelets 44/17/3463 180     nRBC 06/21/2018 0     Neutrophils Relative 06/21/2018 44     Immat GRANS % 06/21/2018 0     Lymphocytes Relative 06/21/2018 40     Monocytes Relative 06/21/2018 14*    Eosinophils Relative 06/21/2018 1     Basophils Relative 06/21/2018 1     Neutrophils Absolute 06/21/2018 2 68     Immature Grans Absolute 06/21/2018 0 02     Lymphocytes Absolute 06/21/2018 2 36     Monocytes Absolute 06/21/2018 0 81     Eosinophils Absolute 06/21/2018 0 05     Basophils Absolute 06/21/2018 0 05     Sodium 06/21/2018 141     Potassium 06/21/2018 3 1*    Chloride 06/21/2018 102     CO2 06/21/2018 33*    Anion Gap 06/21/2018 6     BUN 06/21/2018 13     Creatinine 06/21/2018 1 36*    Glucose 06/21/2018 133     Calcium 06/21/2018 8 0*    AST 06/21/2018 64*    ALT 06/21/2018 58     Alkaline Phosphatase 06/21/2018 83     Total Protein 06/21/2018 7 2     Albumin 06/21/2018 3 2*    Total Bilirubin 06/21/2018 0 70     eGFR 06/21/2018 50     Troponin I 06/21/2018 <0 02     Protime 06/21/2018 17 4*    INR 06/21/2018 1 52*    Troponin I 06/22/2018 0 02     Sodium 06/22/2018 140     Potassium 06/22/2018 3 8     Chloride 06/22/2018 104     CO2 06/22/2018 28     Anion Gap 06/22/2018 8     BUN 06/22/2018 19     Creatinine 06/22/2018 1 40*    Glucose 06/22/2018 110     Calcium 06/22/2018 7 9*    eGFR 06/22/2018 48     WBC 06/22/2018 6 13     RBC 06/22/2018 3 15*    Hemoglobin 06/22/2018 12 4     Hematocrit 06/22/2018 36 1*    MCV 06/22/2018 115*    MCH 06/22/2018 39 4*    MCHC 06/22/2018 34 3     RDW 06/22/2018 13 3     Platelets 73/59/7424 163     MPV 06/22/2018 9 9     Protime 06/22/2018 20 4*    INR 06/22/2018 1 86*    TSH 3RD GENERATON 06/22/2018 3 888*    Ventricular Rate 06/21/2018 150     Atrial Rate 06/21/2018 120     QRSD Interval 06/21/2018 98     QT Interval 06/21/2018 332     QTC Interval 06/21/2018 524     QRS Axis 06/21/2018 56     T Wave Axis 06/21/2018 93       Imaging: No results found      Review of Systems:  Review of Systems  afib with palpitations, LE edema, ED, afib episodes getting closer together, no chest pain, excessino sob, all other 12 point ROS negative  Physical Exam:  Physical Exam      GEN: NAD, Alert and oriented, well appearing  HEENT:Head, neck, ears, oral pharynx: Mucus membranes moist, oral pharynx clear, nares clear  External ears normal  EYES: Pupils equal, sclera anicteric  NECK: No JVD, no carotid bruits  CARDIOVASCULAR: RRR, 2/6 NATALIA murmur, rub, gallops S1,S2  LUNGS: Clear To auscultation bilaterally, no rales, no wheezes, no rhonchi  ABDOMEN: Soft, nondistended, non-tender, bowel sounds present  EXTREMITIES/VASCULAR: No edema  Compression stockings  PSYCH: Normal Affect  NEURO: Grossly intact, moving all extremiteis equal, face symetric  HEME: No bleeding, bruising, petechia  SKIN: No significant rashes        Discussion/Summary:  Symptomatic paroxysmal atrial fibrillation and he also has a degree of sick sinus syndrome sotalol was not an option for him anymore he believes he was on that in the past   Discussed with patient long-term potential side effects of amiodarone from the iodine and he would prefer to get off of it  Dofetilide would be a reasonable choice for him but so would ablation    I explained in detail how catheter ablation is performed I showed him pictures I went through power point presentation about the nature of atrial fibrillation I did explain that approximately 30% of people require a 2nd procedure that risks include but are not limited to bleeding bruising the potential risk of pacemaker which he may need eventually anyway given his sick sinus syndrome small risk of stroke heart perforation damage to the esophagus and damage to the phrenic nerve  We will maintain him on warfarin during his procedure once he has his AFib ablation we will stop his amiodarone    CT scan of pulmonary veins ordered to assess anatomy      Previous AVR  appears to be working appropriately soft systolic murmur today      thank you for sending him to me in consultation arrangements will be made for AFib ablation

## 2018-08-24 NOTE — DISCHARGE SUMMARY
Discharge Summary - Elder Lester 69 y o  male MRN: [id number]    Unit/Bed#: [facility] 01-01 Encounter: [id number]      Admission Date: 8/24/2018   Discharge Date: 8/25/2018    Discharge Diagnosis:   1  Symptomatic paroxysmal atrial fibrillation status post cryoablation with pulmonary vein isolation and typical flutter line 8/24/2018   A ) on Coumadin anticoagulation   B ) previously on amiodarone however had elevated LFTs, this has been discontinued  2  CAD status post remote stent  3  LV systolic function at lower limits of normal with ejection fraction 50 percent per echo 06/2018  4  Bioprosthetic AVR  5  Hypertension  6  CKD stage III    Procedures Performed:   Orders Placed This Encounter   Procedures    Cardiac eps/afib ablation   1  Transesophageal echocardiogram which showed no evidence of left atrial appendage thrombus  2  Cryoablation of atrial fibrillation with pulmonary vein isolation  3  Typical cavotricuspid isthmus dependent atrial flutter ablation    Consultants: none    HPI: Please refer to the initial history and physical as well as procedure notes for full details  Briefly, Elder Lester is a 69y o  year old male with a history of symptomatic paroxysmal atrial fibrillation on Coumadin anticoagulation, hypertension, low normal LV systolic function, and prior bioprosthetic AVR  He typically follows with Dr Gypsy Ortega as an outpatient  He was referred to Dr Niall Kenney for worsening episodes of AFib  Per notes, he was previously tried on amiodarone but had elevated LFTs while taking this  An ablation was recommended, and he presented this hospital admission to undergo this procedure  Hospital Course: Elder Lester presented at his baseline state of health  After the procedure was explained in detail and consent was obtained, he underwent the above-listed procedures without complications  Please see Dr Roseline Saldana procedure notes for full details  He tolerated the procedure well   He was then monitored overnight for further observation  There were no acute issues or events overnight  The following morning he denied all cardiac complaints, including chest pain/pressure, dyspnea, palpitations, dizziness, lightheadedness, or syncope  His vital signs were reviewed and labs are stable  Telemetry showed sinus rhythm with intermittent sinus bradycardia with rates 50s without significant events  His groins were soft without significant hematoma or recurrent bleeding and b/l groin sutures were removed  Physical exam:    GEN: NAD, alert and oriented, well appearing  SKIN: dry without significant lesions or rashes  HEENT: NCAT, PERRL, EOMs intact  NECK: No JVD appreciated  CARDIOVASCULAR: RRR, normal S1, S2 without murmurs, rubs, or gallops appreciated  B/l groin site soft, NT, no bleeding or hematoma, sutures removed from b/l femoral sites and band aide applied  LUNGS: Clear to auscultation bilaterally without wheezes, rhonchi, or rales  ABDOMEN: Soft, nontender, nondistended  EXTREMITIES/VASCULAR: perfused without clubbing, cyanosis, or edema b/l  PSYCH: Normal mood and affect  NEURO: CN ll-Xll grossly intact    He was given routine post ablation discharge instructions and restrictions, and these were explained in detail  He was given a follow up appointment Vinay Kimball, and he will see Dr Luisa Chu, his primary cardiologist on 9/7/18 as scheduled  In terms of his medications, his amiodarone has been discontinued  He will otherwise continue his prior regimen, including Lopressor and Pepcid   His INR on discharge was 2 78; he stopped amiodarone just prior to his procedure and was instructed to continue his home dosing of 5mg Tues and Thurs and 2 5mg all other days; he will check his INR on Tuesday 8/28 with results to his coumadin clinic in [place]; he was advised to let them know he has stopped amiodarone as his dose may need adjustment in the near future    He is stable for discharge at this time with all questions answered  Discharge Medications:  See after visit summary for reconciled discharge medications provided to patient and family  Prescriptions Prior to Admission   Medication    famotidine (PEPCID) 20 mg tablet    furosemide (LASIX) 80 mg tablet    metoprolol tartrate (LOPRESSOR) 25 mg tablet    potassium chloride (K-DUR,KLOR-CON) 20 mEq tablet    Pyridoxine HCl (VITAMIN B6 PO)    tamsulosin (FLOMAX) 0 4 mg    traMADol (ULTRAM) 50 mg tablet    warfarin (COUMADIN) 5 mg tablet    warfarin (COUMADIN) 7 5 mg tablet     [facility]inet Labs/diagnostics:  CBC with diff:   Results from last 7 days  Lab Units 08/24/18  0634   WBC Thousand/uL 4 93   HEMOGLOBIN g/dL 13 6   HEMATOCRIT % 40 6   MCV fL 119*   PLATELETS Thousands/uL 188   MCH pg 39 9*   MCHC g/dL 33 5   RDW % 12 2   MPV fL 10 0   NRBC AUTO /100 WBCs 0     BMP:  Results from last 7 days  Lab Units 08/24/18  0634   SODIUM mmol/L 140   POTASSIUM mmol/L 3 7   CHLORIDE mmol/L 104   CO2 mmol/L 30   BUN mg/dL 15   CREATININE mg/dL 1 29   GLUCOSE RANDOM mg/dL 102   CALCIUM mg/dL 8 5     Magnesium:       Coags:   Results from last 7 days  Lab Units 08/24/18  0634   INR  7 61*     Complications: none    Condition at Discharge: good     Discharge instructions/Information to patient and family:   See after visit summary for information provided to patient and family  Provisions for Follow-Up Care:  See after visit summary for information related to follow-up care and any pertinent home health orders  Disposition: Home    Planned Readmission: No    Discharge Statement   I spent 45 minutes minutes discharging the patient  This time was spent on the day of discharge  I had direct contact with the patient on the day of discharge  Additional documentation is required if more than 30 minutes were spent on discharge   Evaluating the incision, discussing discharge instructions and restrictions, arranging follow up appointments, discussing medications

## 2018-08-24 NOTE — DISCHARGE INSTRUCTIONS
Please discontinue amiodarone  Please take Coumadin as directed, and check INR on Tuesday 8/28/2018; as discussed, please inform you coumadin clinic in [place] that you have stopped amiodarone  No heavy lifting or strenuous activity for one week  No soaking in a bath tub/hot tub/swimming pool for one week or until groin heals  If you notice ongoing bleeding, swelling, or firm lumps in groin near ablation incision, please contact Dr Kennedy Escalante office - [phone]

## 2018-08-24 NOTE — ANESTHESIA POSTPROCEDURE EVALUATION
Post-Op Assessment Note      CV Status:  Stable    Mental Status:  Alert and awake    Hydration Status:  Euvolemic    PONV Controlled:  None    Airway Patency:  Patent    Post Op Vitals Reviewed: Yes          Staff: CRNA       Comments: Pt  to Pacu  Report given;  Course uneventful  VSS  Fully AAO x3 ; MORRIS's x4            /82 (08/24/18 1203)    Temp (!) (P) 95 5 °F (35 3 °C) (08/24/18 1204)    Pulse (P) 58 (08/24/18 1204)   Resp (P) 16 (08/24/18 1204)    SpO2 (P) 100 % (08/24/18 1204)

## 2018-08-24 NOTE — OP NOTE
ELECTROPHYSIOLOGY  OPERATIVE REPORT  PATIENT NAME: Elvia Munroe  :  1940  MRN: [id number]  Date of surgery: 18  Surgeon: Jazmin Mccray DO Pt Location: Cath Lab    PROCEDURE PERFORMED:  1  Pulmonary vein isolation for treatment of atrial fibrillation    2   3D electro anatomic mapping using the [facility] precision two mapping system    3  Intracardiac echo    4  Catheter ablation of inducible typical atrial flutter          Preoperative Medications: Ancef 2 g  ANESTHESIA:  General    PREOPERATIVE DIAGNOSIS:   Symptomatic paroxysmal atrial fibrillation      POSTOPERATIVE DIAGNOSIS: Successful pulmonary vein isolation for treatment of atrial fibrillation and catheter ablation of inducible typical atrial flutter     Procedure Description:  After informed consent was obtained, the patient was brought to the electrophysiology laboratory NPO  A time out was called and the patient was properly identified  The patient was pre-medicated as above  Using ultrasound guidance a nine Western Danni long sheath and an eight Western Danni short sheath were placed in the left common femoral vein a seven Western Danni and 10 Western Danni short sheath were placed in the right common femoral vein diagnostic electrophysiology catheter octapolar was placed in the coronary sinus for left atrial pacing and recording an octapolar catheter was placed in the right atrium intracardiac echo was placed in the right atrium and used throughout the procedure to guide catheter placement ablation and assess for pericardial effusion as well as guide transseptal catheterization  A 3D electro anatomic map was constructed of the right atrium and left atrium using the [facility] precision two mapping system  Burst pacing was performed from the left atrium down to 260 milliseconds and typical atrial flutter was induced at 340 milliseconds CS proximal to distal activation sequence consistent with typical atrial flutter      The flutter was initially sustained however it eventually did self terminate  This was a clinically significant inducible arrhythmia and therefore was ablated  Using an FJ curve [facility] flexibility irrigated tip catheter ablation was commenced at six o'clock on the tricuspid annulus and carried down to the inferior vena cava the initial cava tricuspid isthmus conduction time was 80 milliseconds the final time was 180 milliseconds septal lateral and lateral to septal bidirectional block was demonstrated  All ablation lesions were mapped in TRENT and PATRIC caudal projection on the [facility] precision two mapping system    Heparin was bolused immediately upon catheter placement in the beginning of the procedure once the ACT was greater than 300 sec transseptal catheterization was performed with a BRK one needle and intracardiac echo guidance  Patient had four pulmonary veins left superior left inferior right superior and right inferior  2 cryo balloon applications were applied to all the pulmonary veins with the exception of the left superior pulmonary vein which required three cryo balloon applications to achieve pulmonary vein isolation  And a 3D mapped electro anatomic map was constructed of the left atrium during cryo balloon ablation  Following the ablation pacing was performed from the Lasso at high output in each pulmonary vein and entrance and exit block was demonstrated  The esophagus ran by the left superior and left inferior pulmonary vein the esophageal temperature was monitored at all times and in the lowest recorded temperature was 31 2 degree C    Phrenic nerve pacing was performed at all times during cryo balloon ablation of the right-sided pulmonary veins with no loss of phrenic nerve capture    Catheters were withdrawn from the left atrium       The flutter line was read checked and bidirectional block was still present electrophysiologic study was performed basic cycle length 881 milliseconds AH interval 123 milliseconds HV interval 50 milliseconds PA interval 191 milliseconds QRS duration 160 milliseconds QT interval 455 milliseconds    Wenckebach block with atrial pacing 460 milliseconds atrial effective refractory period at 600 milliseconds was 290 milliseconds av olivia effective refractory period at 600 milliseconds was less than or equal to 290 milliseconds ventriculoatrial conduction was midline and decremental with VA Wenckebach at 500 milliseconds    EBL: Minimal  Complications: None  Contrast:  50 cc  Findings:  Inducible typical atrial flutter successfully ablated pulmonary vein isolation successfully achieved  No complications  Patient was bolused protamine at the end of the procedure and catheters were withdrawn his INR is therapeutic and he will remain on warfarin  The patient tolerated the procedure well  Plan: Routine postoperative care     Amiodarone follow-up Dr Yoder Exon

## 2018-08-24 NOTE — ANESTHESIA PREPROCEDURE EVALUATION
Review of Systems/Medical History  Patient summary reviewed  Chart reviewed      Cardiovascular  EKG reviewed, Hypertension controlled, CAD , CAD status: 3VD, History of CABG, CHF , NYHA Classification: II compensated CHF,    Pulmonary  Sleep apnea CPAP,        GI/Hepatic    GERD well controlled,             Endo/Other    Obesity  morbid obesity   GYN       Hematology   Musculoskeletal       Neurology   Psychology           Physical Exam    Airway    Mallampati score: III  TM Distance: <3 FB  Neck ROM: limited     Dental   No notable dental hx     Cardiovascular  Rhythm: irregular, Rate: abnormal,     Pulmonary  Breath sounds clear to auscultation, Decreased breath sounds,     Other Findings  Decreased secondary to habitus      Anesthesia Plan  ASA Score- 4     Anesthesia Type- general with ASA Monitors  Additional Monitors:   Airway Plan: ETT  Plan Factors-    Induction- intravenous  Postoperative Plan- Plan for postoperative opioid use  Informed Consent- Anesthetic plan and risks discussed with patient, spouse and daughter  I personally reviewed this patient with the CRNA  Discussed and agreed on the Anesthesia Plan with the CRNA  Marc Gleason

## 2018-08-25 LAB
ANION GAP SERPL CALCULATED.3IONS-SCNC: 6 MMOL/L (ref 4–13)
BUN SERPL-MCNC: 15 MG/DL (ref 5–25)
CALCIUM SERPL-MCNC: 8.4 MG/DL (ref 8.3–10.1)
CHLORIDE SERPL-SCNC: 105 MMOL/L (ref 100–108)
CO2 SERPL-SCNC: 29 MMOL/L (ref 21–32)
CREAT SERPL-MCNC: 1.22 MG/DL (ref 0.6–1.3)
ERYTHROCYTE [DISTWIDTH] IN BLOOD BY AUTOMATED COUNT: 12.8 % (ref 11.6–15.1)
GFR SERPL CREATININE-BSD FRML MDRD: 57 ML/MIN/1.73SQ M
GLUCOSE SERPL-MCNC: 103 MG/DL (ref 65–140)
HCT VFR BLD AUTO: 36 % (ref 36.5–49.3)
HGB BLD-MCNC: 12.1 G/DL (ref 12–17)
INR PPP: 2.78 (ref 0.86–1.17)
MCH RBC QN AUTO: 40.1 PG (ref 26.8–34.3)
MCHC RBC AUTO-ENTMCNC: 33.6 G/DL (ref 31.4–37.4)
MCV RBC AUTO: 119 FL (ref 82–98)
PLATELET # BLD AUTO: 165 THOUSANDS/UL (ref 149–390)
PMV BLD AUTO: 10.4 FL (ref 8.9–12.7)
POTASSIUM SERPL-SCNC: 3.9 MMOL/L (ref 3.5–5.3)
PROTHROMBIN TIME: 29.4 SECONDS (ref 11.8–14.2)
RBC # BLD AUTO: 3.02 MILLION/UL (ref 3.88–5.62)
SODIUM SERPL-SCNC: 140 MMOL/L (ref 136–145)
WBC # BLD AUTO: 7.26 THOUSAND/UL (ref 4.31–10.16)

## 2018-08-25 PROCEDURE — 85027 COMPLETE CBC AUTOMATED: CPT | Performed by: PHYSICIAN ASSISTANT

## 2018-08-25 PROCEDURE — 80048 BASIC METABOLIC PNL TOTAL CA: CPT | Performed by: PHYSICIAN ASSISTANT

## 2018-08-25 PROCEDURE — 85610 PROTHROMBIN TIME: CPT | Performed by: PHYSICIAN ASSISTANT

## 2018-08-25 RX ORDER — WARFARIN SODIUM 5 MG/1
2.5 TABLET ORAL
Refills: 0
Start: 2018-08-25 | End: 2021-07-21

## 2018-08-28 ENCOUNTER — TELEPHONE (OUTPATIENT)
Dept: CARDIOLOGY CLINIC | Facility: CLINIC | Age: 78
End: 2018-08-28

## 2018-08-28 NOTE — TELEPHONE ENCOUNTER
Pt had cryoablation last Friday & since then, has not had a bowel movement  Pt is usually regular, per Berenice Vega, and believes pt has been hydrating OK  Tea with fiber this AM, no help  Meds reviewed-nothing new on discharge; taking tramadol only prn-- hasn't been using it  What  OTC can use safely with his cardiac meds? CYRUS  Thanks

## 2018-09-04 ENCOUNTER — APPOINTMENT (OUTPATIENT)
Dept: LAB | Facility: HOSPITAL | Age: 78
End: 2018-09-04
Attending: INTERNAL MEDICINE
Payer: MEDICARE

## 2018-09-04 ENCOUNTER — TRANSCRIBE ORDERS (OUTPATIENT)
Dept: ADMINISTRATIVE | Facility: HOSPITAL | Age: 78
End: 2018-09-04

## 2018-09-04 DIAGNOSIS — I10 ESSENTIAL HYPERTENSION, MALIGNANT: ICD-10-CM

## 2018-09-04 DIAGNOSIS — I10 ESSENTIAL HYPERTENSION, MALIGNANT: Primary | ICD-10-CM

## 2018-09-04 LAB
ALBUMIN SERPL BCP-MCNC: 2.9 G/DL (ref 3.5–5)
ALP SERPL-CCNC: 69 U/L (ref 46–116)
ALT SERPL W P-5'-P-CCNC: 25 U/L (ref 12–78)
ANION GAP SERPL CALCULATED.3IONS-SCNC: 5 MMOL/L (ref 4–13)
AST SERPL W P-5'-P-CCNC: 30 U/L (ref 5–45)
BILIRUB SERPL-MCNC: 0.6 MG/DL (ref 0.2–1)
BUN SERPL-MCNC: 17 MG/DL (ref 5–25)
CALCIUM SERPL-MCNC: 8.9 MG/DL (ref 8.3–10.1)
CHLORIDE SERPL-SCNC: 102 MMOL/L (ref 100–108)
CO2 SERPL-SCNC: 31 MMOL/L (ref 21–32)
CREAT SERPL-MCNC: 1.34 MG/DL (ref 0.6–1.3)
GFR SERPL CREATININE-BSD FRML MDRD: 51 ML/MIN/1.73SQ M
GLUCOSE P FAST SERPL-MCNC: 111 MG/DL (ref 65–99)
POTASSIUM SERPL-SCNC: 3.9 MMOL/L (ref 3.5–5.3)
PROT SERPL-MCNC: 6.8 G/DL (ref 6.4–8.2)
SODIUM SERPL-SCNC: 138 MMOL/L (ref 136–145)

## 2018-09-04 PROCEDURE — 36415 COLL VENOUS BLD VENIPUNCTURE: CPT

## 2018-09-04 PROCEDURE — 80053 COMPREHEN METABOLIC PANEL: CPT

## 2018-09-06 ENCOUNTER — OFFICE VISIT (OUTPATIENT)
Dept: FAMILY MEDICINE CLINIC | Facility: HOSPITAL | Age: 78
End: 2018-09-06
Payer: MEDICARE

## 2018-09-06 VITALS
SYSTOLIC BLOOD PRESSURE: 110 MMHG | BODY MASS INDEX: 33.64 KG/M2 | HEIGHT: 70 IN | DIASTOLIC BLOOD PRESSURE: 60 MMHG | HEART RATE: 63 BPM | WEIGHT: 235 LBS

## 2018-09-06 DIAGNOSIS — K64.1 GRADE II HEMORRHOIDS: ICD-10-CM

## 2018-09-06 DIAGNOSIS — Z23 NEED FOR INFLUENZA VACCINATION: ICD-10-CM

## 2018-09-06 DIAGNOSIS — Z95.3 S/P AORTIC VALVE REPLACEMENT WITH BIOPROSTHETIC VALVE: ICD-10-CM

## 2018-09-06 DIAGNOSIS — I10 ESSENTIAL HYPERTENSION: ICD-10-CM

## 2018-09-06 DIAGNOSIS — I48.0 PAROXYSMAL ATRIAL FIBRILLATION (HCC): Primary | ICD-10-CM

## 2018-09-06 PROCEDURE — G0008 ADMIN INFLUENZA VIRUS VAC: HCPCS

## 2018-09-06 PROCEDURE — 99214 OFFICE O/P EST MOD 30 MIN: CPT | Performed by: INTERNAL MEDICINE

## 2018-09-06 PROCEDURE — 90662 IIV NO PRSV INCREASED AG IM: CPT

## 2018-09-06 NOTE — PROGRESS NOTES
Assessment/Plan:       Diagnoses and all orders for this visit:    Paroxysmal atrial fibrillation ([address] )    Essential hypertension    S/P aortic valve replacement with bioprosthetic valve    Grade II hemorrhoids  -     hydrocortisone (ANUSOL-HC, PROCTOSOL HC,) 2 5 % rectal cream; Insert into the rectum 2 (two) times a day Apply externally BID          All of the above diagnoses have been assessed  Additional COMMENTS/PLAN: Has card apt tomorrow  Will defer change in metoprolol to them  Subjective:      Patient ID: Elvia Munroe is a 69 y o  male  HPI     Pt had an ablation a few weeks ago  Over the weekend he thought he was in afib and took pulse and it had been 147-then took it again and it went down  It has not been up since that time  Is due to get his INR  Done a few days ago  The following portions of the patient's history were revised and updated as appropriate: Problem list, allergies, med list, FH, SH, Past medical and surgical histories      Current Outpatient Prescriptions   Medication Sig Dispense Refill    famotidine (PEPCID) 20 mg tablet Take by mouth      furosemide (LASIX) 80 mg tablet Take 1 tablet (80 mg total) by mouth daily  0    metoprolol tartrate (LOPRESSOR) 25 mg tablet Take 0 5 tablets (12 5 mg total) by mouth every 12 (twelve) hours  0    potassium chloride (K-DUR,KLOR-CON) 20 mEq tablet Take 1 tablet (20 mEq total) by mouth 2 (two) times a day 60 tablet 0    Pyridoxine HCl (VITAMIN B6 PO) Take 120 mg by mouth daily      tamsulosin (FLOMAX) 0 4 mg Take by mouth      traMADol (ULTRAM) 50 mg tablet Take by mouth      warfarin (COUMADIN) 5 mg tablet Take 0 5 tablets (2 5 mg total) by mouth daily Take 5mg Tuesday and Thursday and 2 5mg all other days; repeat INR Tuesday 8/28  0    hydrocortisone (ANUSOL-HC, PROCTOSOL HC,) 2 5 % rectal cream Insert into the rectum 2 (two) times a day Apply externally BID 30 g 0     No current facility-administered medications for this visit  Review of Systems   All other systems reviewed and are negative  Objective:    /60 (BP Location: Left arm, Patient Position: Sitting, Cuff Size: Standard)   Pulse 63   Ht 5' 10" (1 778 m)   Wt 107 kg (235 lb)   BMI 33 72 kg/m²      Physical Exam   Constitutional: He appears well-developed and well-nourished  Neck: No JVD present  Cardiovascular: Normal rate and regular rhythm  Pulmonary/Chest: Effort normal and breath sounds normal    Abdominal: Soft  Bowel sounds are normal    Genitourinary:   Genitourinary Comments: Has external hemorrhoids   Musculoskeletal: He exhibits no edema  Vitals reviewed  Appointment on 09/04/2018   Component Date Value Ref Range Status    Sodium 09/04/2018 138  136 - 145 mmol/L Final    Potassium 09/04/2018 3 9  3 5 - 5 3 mmol/L Final    Chloride 09/04/2018 102  100 - 108 mmol/L Final    CO2 09/04/2018 31  21 - 32 mmol/L Final    ANION GAP 09/04/2018 5  4 - 13 mmol/L Final    BUN 09/04/2018 17  5 - 25 mg/dL Final    Creatinine 09/04/2018 1 34* 0 60 - 1 30 mg/dL Final    Standardized to IDMS reference method    Glucose, Fasting 09/04/2018 111* 65 - 99 mg/dL Final      Specimen collection should occur prior to Sulfasalazine administration due to the potential for falsely depressed results  Specimen collection should occur prior to Sulfapyridine administration due to the potential for falsely elevated results   Calcium 09/04/2018 8 9  8 3 - 10 1 mg/dL Final    AST 09/04/2018 30  5 - 45 U/L Final      Specimen collection should occur prior to Sulfasalazine administration due to the potential for falsely depressed results   ALT 09/04/2018 25  12 - 78 U/L Final      Specimen collection should occur prior to Sulfasalazine administration due to the potential for falsely depressed results       Alkaline Phosphatase 09/04/2018 69  46 - 116 U/L Final    Total Protein 09/04/2018 6 8  6 4 - 8 2 g/dL Final    Albumin 09/04/2018 2 9* 3 5 - 5 0 g/dL Final    Total Bilirubin 09/04/2018 0 60  0 20 - 1 00 mg/dL Final    eGFR 09/04/2018 51  ml/min/1 73sq m Final   Admission on 08/24/2018, Discharged on 08/25/2018   Component Date Value Ref Range Status    Sodium 08/24/2018 140  136 - 145 mmol/L Final    Potassium 08/24/2018 3 7  3 5 - 5 3 mmol/L Final    Chloride 08/24/2018 104  100 - 108 mmol/L Final    CO2 08/24/2018 30  21 - 32 mmol/L Final    ANION GAP 08/24/2018 6  4 - 13 mmol/L Final    BUN 08/24/2018 15  5 - 25 mg/dL Final    Creatinine 08/24/2018 1 29  0 60 - 1 30 mg/dL Final    Standardized to IDMS reference method    Glucose 08/24/2018 102  65 - 140 mg/dL Final      If the patient is fasting, the ADA then defines impaired fasting glucose as > 100 mg/dL and diabetes as > or equal to 123 mg/dL  Specimen collection should occur prior to Sulfasalazine administration due to the potential for falsely depressed results  Specimen collection should occur prior to Sulfapyridine administration due to the potential for falsely elevated results   Glucose, Fasting 08/24/2018 102* 65 - 99 mg/dL Final      Specimen collection should occur prior to Sulfasalazine administration due to the potential for falsely depressed results  Specimen collection should occur prior to Sulfapyridine administration due to the potential for falsely elevated results      Calcium 08/24/2018 8 5  8 3 - 10 1 mg/dL Final    eGFR 08/24/2018 53  ml/min/1 73sq m Final    WBC 08/24/2018 4 93  4 31 - 10 16 Thousand/uL Final    RBC 08/24/2018 3 41* 3 88 - 5 62 Million/uL Final    Hemoglobin 08/24/2018 13 6  12 0 - 17 0 g/dL Final    Hematocrit 08/24/2018 40 6  36 5 - 49 3 % Final    MCV 08/24/2018 119* 82 - 98 fL Final    MCH 08/24/2018 39 9* 26 8 - 34 3 pg Final    MCHC 08/24/2018 33 5  31 4 - 37 4 g/dL Final    RDW 08/24/2018 12 2  11 6 - 15 1 % Final    MPV 08/24/2018 10 0  8 9 - 12 7 fL Final    Platelets 81/15/9682 188  149 - 390 Thousands/uL Final    nRBC 08/24/2018 0  /100 WBCs Final    Neutrophils Relative 08/24/2018 46  43 - 75 % Final    Immat GRANS % 08/24/2018 0  0 - 2 % Final    Lymphocytes Relative 08/24/2018 36  14 - 44 % Final    Monocytes Relative 08/24/2018 15* 4 - 12 % Final    Eosinophils Relative 08/24/2018 2  0 - 6 % Final    Basophils Relative 08/24/2018 1  0 - 1 % Final    Neutrophils Absolute 08/24/2018 2 29  1 85 - 7 62 Thousands/µL Final    Immature Grans Absolute 08/24/2018 0 02  0 00 - 0 20 Thousand/uL Final    Lymphocytes Absolute 08/24/2018 1 75  0 60 - 4 47 Thousands/µL Final    Monocytes Absolute 08/24/2018 0 72  0 17 - 1 22 Thousand/µL Final    Eosinophils Absolute 08/24/2018 0 08  0 00 - 0 61 Thousand/µL Final    Basophils Absolute 08/24/2018 0 07  0 00 - 0 10 Thousands/µL Final    Protime 08/24/2018 23 3* 11 8 - 14 2 seconds Final    INR 08/24/2018 2 06* 0 86 - 1 17 Final    Activated Clotting Time, i-STAT 08/24/2018 316* 89 - 137 sec Final    Specimen Type 08/24/2018 VENOUS   Final    Activated Clotting Time, i-STAT 08/24/2018 361* 89 - 137 sec Final    Specimen Type 08/24/2018 VENOUS   Final    Activated Clotting Time, i-STAT 08/24/2018 367* 89 - 137 sec Final    Specimen Type 08/24/2018 VENOUS   Final    Activated Clotting Time, i-STAT 08/24/2018 386* 89 - 137 sec Final    Specimen Type 08/24/2018 VENOUS   Final    Activated Clotting Time, i-STAT 08/24/2018 230* 89 - 137 sec Final    Specimen Type 08/24/2018 VENOUS   Final    Ventricular Rate 08/24/2018 62  BPM Final    Atrial Rate 08/24/2018 62  BPM Final    TN Interval 08/24/2018 186  ms Final    QRSD Interval 08/24/2018 108  ms Final    QT Interval 08/24/2018 488  ms Final    QTC Interval 08/24/2018 495  ms Final    P Axis 08/24/2018 76  degrees Final    QRS Axis 08/24/2018 47  degrees Final    T Wave [place] 08/24/2018 78  degrees Final    Sodium 08/25/2018 140  136 - 145 mmol/L Final    Potassium 08/25/2018 3 9  3 5 - 5 3 mmol/L Final    Chloride 08/25/2018 105  100 - 108 mmol/L Final    CO2 08/25/2018 29  21 - 32 mmol/L Final    ANION GAP 08/25/2018 6  4 - 13 mmol/L Final    BUN 08/25/2018 15  5 - 25 mg/dL Final    Creatinine 08/25/2018 1 22  0 60 - 1 30 mg/dL Final    Standardized to IDMS reference method    Glucose 08/25/2018 103  65 - 140 mg/dL Final      If the patient is fasting, the ADA then defines impaired fasting glucose as > 100 mg/dL and diabetes as > or equal to 123 mg/dL  Specimen collection should occur prior to Sulfasalazine administration due to the potential for falsely depressed results  Specimen collection should occur prior to Sulfapyridine administration due to the potential for falsely elevated results      Calcium 08/25/2018 8 4  8 3 - 10 1 mg/dL Final    eGFR 08/25/2018 57  ml/min/1 73sq m Final    WBC 08/25/2018 7 26  4 31 - 10 16 Thousand/uL Final    RBC 08/25/2018 3 02* 3 88 - 5 62 Million/uL Final    Hemoglobin 08/25/2018 12 1  12 0 - 17 0 g/dL Final    Hematocrit 08/25/2018 36 0* 36 5 - 49 3 % Final    MCV 08/25/2018 119* 82 - 98 fL Final    MCH 08/25/2018 40 1* 26 8 - 34 3 pg Final    MCHC 08/25/2018 33 6  31 4 - 37 4 g/dL Final    RDW 08/25/2018 12 8  11 6 - 15 1 % Final    Platelets 00/59/0962 165  149 - 390 Thousands/uL Final    MPV 08/25/2018 10 4  8 9 - 12 7 fL Final    Protime 08/25/2018 29 4* 11 8 - 14 2 seconds Final    INR 08/25/2018 2 78* 0 86 - 1 17 Final         Hallie Begum MD

## 2018-09-07 ENCOUNTER — TELEPHONE (OUTPATIENT)
Dept: FAMILY MEDICINE CLINIC | Facility: HOSPITAL | Age: 78
End: 2018-09-07

## 2018-09-07 ENCOUNTER — OFFICE VISIT (OUTPATIENT)
Dept: CARDIOLOGY CLINIC | Facility: CLINIC | Age: 78
End: 2018-09-07
Payer: MEDICARE

## 2018-09-07 VITALS
HEIGHT: 70 IN | BODY MASS INDEX: 33.36 KG/M2 | DIASTOLIC BLOOD PRESSURE: 64 MMHG | HEART RATE: 130 BPM | SYSTOLIC BLOOD PRESSURE: 126 MMHG | WEIGHT: 233 LBS

## 2018-09-07 DIAGNOSIS — I48.0 PAROXYSMAL ATRIAL FIBRILLATION (HCC): Primary | ICD-10-CM

## 2018-09-07 PROCEDURE — 93000 ELECTROCARDIOGRAM COMPLETE: CPT | Performed by: INTERNAL MEDICINE

## 2018-09-07 PROCEDURE — 99214 OFFICE O/P EST MOD 30 MIN: CPT | Performed by: INTERNAL MEDICINE

## 2018-09-07 RX ORDER — AMIODARONE HYDROCHLORIDE 200 MG/1
200 TABLET ORAL 2 TIMES DAILY
Qty: 52 TABLET | Refills: 0 | Status: SHIPPED | OUTPATIENT
Start: 2018-09-07 | End: 2018-10-05 | Stop reason: SDUPTHER

## 2018-09-07 NOTE — PATIENT INSTRUCTIONS
1  Start Amiodarone 200mg twice a day for 21 days  Then 200mg daily thereafter    2  EKG in two weeks  If still in atrial fibrillation will do a cardioversion before going back to [place]

## 2018-09-07 NOTE — PROGRESS NOTES
Cardiology Follow Up    Minor Petties  1940  [id number]  [phone]    1  Paroxysmal atrial fibrillation (HCC)  POCT ECG       Interval History: Followup post ablation  He went into atrial fibrillation over the weekend  He has some palpitations but today he is actually asymptomatic  No chest pain or dyspnea on exertion  Problem List     Paroxysmal atrial fibrillation ([address] )    Coronary artery disease involving native coronary artery of native heart without angina pectoris    Overview Signed 7/5/2018  4:08 PM by Kathleen Nguyen MD     S/P CABG-2016 [facility] hypertension    S/P aortic valve replacement with bioprosthetic valve    Benign prostatic hyperplasia without lower urinary tract symptoms    Gastroesophageal reflux disease without esophagitis    Stage III chronic kidney disease    Anticoagulation adequate        Past Medical History:   Diagnosis Date    Atrial fibrillation (HCC)     BPH (benign prostatic hyperplasia)     CHF (congestive heart failure) (HCC)     GERD (gastroesophageal reflux disease)     Skin cancer     Sleep apnea      Social History     Social History    Marital status: /Civil Union     Spouse name: N/A    Number of children: N/A    Years of education: N/A     Occupational History    Not on file       Social History Main Topics    Smoking status: Never Smoker    Smokeless tobacco: Never Used    Alcohol use Yes    Drug use: No    Sexual activity: Not on file     Other Topics Concern    Not on file     Social History Narrative    No narrative on file      Family History   Problem Relation Age of Onset    Cancer Mother     Dementia Father      Past Surgical History:   Procedure Laterality Date    CARDIAC SURGERY      CHOLECYSTECTOMY      CORONARY ARTERY BYPASS GRAFT      CORONARY STENT PLACEMENT      TONSILLECTOMY      VALVE REPLACEMENT         Current Outpatient Prescriptions:     famotidine (PEPCID) 20 mg tablet, Take by mouth, Disp: , Rfl:     furosemide (LASIX) 80 mg tablet, Take 1 tablet (80 mg total) by mouth daily, Disp: , Rfl: 0    metoprolol tartrate (LOPRESSOR) 25 mg tablet, Take 0 5 tablets (12 5 mg total) by mouth every 12 (twelve) hours, Disp: , Rfl: 0    potassium chloride (K-DUR,KLOR-CON) 20 mEq tablet, Take 1 tablet (20 mEq total) by mouth 2 (two) times a day, Disp: 60 tablet, Rfl: 0    Pyridoxine HCl (VITAMIN B6 PO), Take 120 mg by mouth daily, Disp: , Rfl:     tamsulosin (FLOMAX) 0 4 mg, Take by mouth, Disp: , Rfl:     traMADol (ULTRAM) 50 mg tablet, Take by mouth, Disp: , Rfl:     warfarin (COUMADIN) 5 mg tablet, Take 0 5 tablets (2 5 mg total) by mouth daily Take 5mg Tuesday and Thursday and 2 5mg all other days; repeat INR Tuesday 8/28, Disp: , Rfl: 0    hydrocortisone (ANUSOL-HC, PROCTOSOL HC,) 2 5 % rectal cream, Insert into the rectum 2 (two) times a day Apply externally BID (Patient not taking: Reported on 9/7/2018 ), Disp: 30 g, Rfl: 0  No Known Allergies    Labs:     Chemistry        Component Value Date/Time     09/04/2018 1512    K 3 9 09/04/2018 1512     09/04/2018 1512    CO2 31 09/04/2018 1512    BUN 17 09/04/2018 1512    CREATININE 1 34 (H) 09/04/2018 1512        Component Value Date/Time    CALCIUM 8 9 09/04/2018 1512    ALKPHOS 69 09/04/2018 1512    AST 30 09/04/2018 1512    ALT 25 09/04/2018 1512            No results found for: CHOL  No results found for: HDL  No results found for: LDLCALC  No results found for: TRIG  No components found for: CHOLHDL    Imaging: Ct Cardiac W Pulmonary Vein Mapping    Result Date: 8/20/2018  Narrative: CARDIAC CT ANGIOGRAPHY - PULMONARY VEIN MAPPING INDICATION:   I48 91: Unspecified atrial fibrillation  TECHNIQUE:  Noncontrast axial localizing images of the heart were obtained   Thin-section cardiac gated CT angiography of the heart and coronary vasculature was performed  3D reformatted images and volume rendering were performed on an independent workstation  Reformatted images were created in multiple planes  Radiation dose length product (DLP) for this visit:  1026 mGy-cm   This examination, like all CT scans performed in the [facility], was performed utilizing techniques to minimize radiation dose exposure, including the use of iterative reconstruction and automated exposure control  IV Contrast:  100 mL of iodixanol (VISIPAQUE) FINDINGS: Pulmonary vein anatomy is typical and four pulmonary veins are identified  The right superior pulmonary vein ostium measures approximately 23 x 16 mm  The right inferior pulmonary vein ostium measures approximately 23 x 17 mm  The superior left pulmonary vein ostium measures approximately 25 x 14 mm  The left inferior pulmonary vein ostium measures approximately 21 x 15 mm  The heart is enlarged  There is no evidence of pericardial effusion  Status post aortic valve replacement, CABG and left atrial appendage clipping  There is a small left pleural effusion, with mild left basilar consolidation which may represent compressive atelectasis  Visualized osseous structures appear within normal limits for the patient's age status post median sternotomy  No clinically significant abnormality identified within the visualized structures of the upper abdomen status post cholecystectomy  Impression: Pulmonary venous anatomy as described above  Cardiomegaly  Small left pleural effusion with suspected left basilar compressive atelectasis  Workstation performed: [id number]       EKG: Atrial Fibrillation  Nonspecific ST T wave abnormality  Review of Systems   Constitution: Negative  HENT: Negative  Eyes: Negative  Cardiovascular: Positive for palpitations  Respiratory: Negative  Endocrine: Negative  Hematologic/Lymphatic: Negative  Skin: Negative  Musculoskeletal: Negative  Gastrointestinal: Negative  Genitourinary: Negative  Neurological: Negative  Psychiatric/Behavioral: Negative  Allergic/Immunologic: Negative  Vitals:    09/07/18 1352   BP: 126/64   Pulse: (!) 130           Physical Exam   Constitutional: He is oriented to person, place, and time  No distress  HENT:   Mouth/Throat: No oropharyngeal exudate  Eyes: No scleral icterus  Neck: No JVD present  Pulmonary/Chest: Effort normal and breath sounds normal  No respiratory distress  He has no wheezes  He has no rales  Abdominal: Soft  Bowel sounds are normal  He exhibits no distension  There is no tenderness  There is no rebound  Musculoskeletal: He exhibits edema  Neurological: He is alert and oriented to person, place, and time  Skin: Skin is warm and dry  He is not diaphoretic  Psychiatric: He has a normal mood and affect  His behavior is normal        Discussion/Summary:         1  Paroxysmal Atrial Fibrillation: He went into atrial fibrillation this past weekend  Hopefully this will settle down as he is farther from his ablation  Will start on oral amiodarone and plan for about three months of this  2  CAD: Med Rx as above      3  Bio AVR: We reviewed his echo together  LVOT flow was increased  The mean gradient is unchanged in comparison to prior echo reports in [place]         The patient was counseled regarding diagnostic results, instructions for management, risk factor reductions, impressions  total time of encounter was 25 minutes and 15 minutes was spent counseling

## 2018-09-10 PROCEDURE — 93312 ECHO TRANSESOPHAGEAL: CPT | Performed by: INTERNAL MEDICINE

## 2018-09-10 PROCEDURE — 93321 DOPPLER ECHO F-UP/LMTD STD: CPT | Performed by: INTERNAL MEDICINE

## 2018-09-10 PROCEDURE — 93325 DOPPLER ECHO COLOR FLOW MAPG: CPT | Performed by: INTERNAL MEDICINE

## 2018-09-21 ENCOUNTER — CLINICAL SUPPORT (OUTPATIENT)
Dept: CARDIOLOGY CLINIC | Facility: CLINIC | Age: 78
End: 2018-09-21
Payer: MEDICARE

## 2018-09-21 ENCOUNTER — TELEPHONE (OUTPATIENT)
Dept: CARDIOLOGY CLINIC | Facility: CLINIC | Age: 78
End: 2018-09-21

## 2018-09-21 VITALS
WEIGHT: 231 LBS | SYSTOLIC BLOOD PRESSURE: 128 MMHG | HEART RATE: 53 BPM | DIASTOLIC BLOOD PRESSURE: 56 MMHG | HEIGHT: 70 IN | BODY MASS INDEX: 33.07 KG/M2

## 2018-09-21 DIAGNOSIS — I48.91 ATRIAL FIBRILLATION, UNSPECIFIED TYPE (HCC): Primary | ICD-10-CM

## 2018-09-21 PROCEDURE — 99024 POSTOP FOLLOW-UP VISIT: CPT

## 2018-09-21 PROCEDURE — 93000 ELECTROCARDIOGRAM COMPLETE: CPT

## 2018-09-21 NOTE — PROGRESS NOTES
Pt seen today for EKG visit  Pt states he had an ablation 08/24/2018 but had gone back into A-fib before his most recent office visit with Dr Jessica Carmen  Per pt he was put back onto a course of oral Amiodarone and was asked to follow-up with an EKG  Pt states he is feeling well generally  Pt denies CP, SOB, or palpitations  Pt wears compression stockings and has his normal amount of edema  /56, pulse 53 - sl  irreg  BP and EKG reviewed by Dr Peter Lee in office  No changes at this time, to follow up with Dr Jessica Carmen

## 2018-09-21 NOTE — TELEPHONE ENCOUNTER
Yes He should stay on amiodarone as previously instructed  We should set up a cardioversion  Lets touch base on Monday when I am down there

## 2018-09-21 NOTE — TELEPHONE ENCOUNTER
Called pt and confirmed that he should continue amiodarone as instructed  Pt verbalized understanding

## 2018-09-21 NOTE — TELEPHONE ENCOUNTER
Pt was seen today for EKG visit, please see visit note  /56, pulse 53 - sl  irreg  EKG reviewed by Dr Ethel Ruiz and scanned to chart  Pt would like to know if he is to remain on Amiodarone  He is taking 200mg twice per day and has about a week before he goes down to the once daily dose noted at his 09/07/2018 OV  Please advise, thank you

## 2018-09-26 ENCOUNTER — TELEPHONE (OUTPATIENT)
Dept: CARDIOLOGY CLINIC | Facility: CLINIC | Age: 78
End: 2018-09-26

## 2018-09-26 NOTE — TELEPHONE ENCOUNTER
Pt stated he took his INR which was 4 02 (high) and sent the results to the Doctor/clinic in [place] that monitors coumadin  Pt stated they spoke with him and asked if he was back on the Amiodarone  Pt stated he was  (Pt was stated back on Amiodarone 200 mg BID for 21 days started 9/8/18 at his 9/7/18 appt then 200 daily thereafter )    Pt was told that he should inquire about taking this, he was told that taking Amiodarone is like taking 6-9 aspirins and they wanted to be sure that he was to be on coumadin and Amiodarone  Please advise    Pt is leaving next Wednesday 10/3/18 to return to [place] where he lives a portion of the year

## 2018-09-27 NOTE — TELEPHONE ENCOUNTER
Pt notified of information    Pt verbalized understanding of information and stated he will call coumadin clinic in [place] and relay information to them and he "will see us in the spring "

## 2018-09-27 NOTE — TELEPHONE ENCOUNTER
He should continue to take the amiodarone and the coumadin dosing can be adjusted  The amiodarone can effect the inr but it is not like taking 6 - 9 aspirins

## 2018-10-05 DIAGNOSIS — I48.0 PAROXYSMAL ATRIAL FIBRILLATION (HCC): ICD-10-CM

## 2018-10-05 RX ORDER — AMIODARONE HYDROCHLORIDE 200 MG/1
TABLET ORAL
Qty: 52 TABLET | Refills: 0 | Status: SHIPPED | OUTPATIENT
Start: 2018-10-05 | End: 2019-06-17 | Stop reason: ALTCHOICE

## 2019-01-07 DIAGNOSIS — K21.9 GASTROESOPHAGEAL REFLUX DISEASE, ESOPHAGITIS PRESENCE NOT SPECIFIED: Primary | ICD-10-CM

## 2019-01-07 DIAGNOSIS — E87.6 HYPOKALEMIA: ICD-10-CM

## 2019-01-07 RX ORDER — POTASSIUM CHLORIDE 20 MEQ/1
TABLET, EXTENDED RELEASE ORAL
Qty: 120 TABLET | Refills: 11 | Status: SHIPPED | OUTPATIENT
Start: 2019-01-07 | End: 2019-06-17 | Stop reason: DRUGHIGH

## 2019-01-07 RX ORDER — FAMOTIDINE 20 MG/1
20 TABLET, FILM COATED ORAL 2 TIMES DAILY
Qty: 180 TABLET | Refills: 3 | Status: SHIPPED | OUTPATIENT
Start: 2019-01-07 | End: 2020-02-28

## 2019-02-04 DIAGNOSIS — N40.0 BENIGN PROSTATIC HYPERPLASIA, UNSPECIFIED WHETHER LOWER URINARY TRACT SYMPTOMS PRESENT: Primary | ICD-10-CM

## 2019-02-04 RX ORDER — TAMSULOSIN HYDROCHLORIDE 0.4 MG/1
0.4 CAPSULE ORAL
Qty: 90 CAPSULE | Refills: 1 | Status: SHIPPED | OUTPATIENT
Start: 2019-02-04 | End: 2019-08-14 | Stop reason: ALTCHOICE

## 2019-06-17 ENCOUNTER — OFFICE VISIT (OUTPATIENT)
Dept: FAMILY MEDICINE CLINIC | Facility: HOSPITAL | Age: 79
End: 2019-06-17
Payer: MEDICARE

## 2019-06-17 VITALS
SYSTOLIC BLOOD PRESSURE: 130 MMHG | DIASTOLIC BLOOD PRESSURE: 70 MMHG | WEIGHT: 224 LBS | BODY MASS INDEX: 32.07 KG/M2 | HEIGHT: 70 IN | HEART RATE: 55 BPM

## 2019-06-17 DIAGNOSIS — R05.9 COUGH: ICD-10-CM

## 2019-06-17 DIAGNOSIS — I10 ESSENTIAL HYPERTENSION: Primary | ICD-10-CM

## 2019-06-17 DIAGNOSIS — J20.8 VIRAL BRONCHITIS: ICD-10-CM

## 2019-06-17 PROCEDURE — 99214 OFFICE O/P EST MOD 30 MIN: CPT | Performed by: INTERNAL MEDICINE

## 2019-08-14 ENCOUNTER — OFFICE VISIT (OUTPATIENT)
Dept: CARDIOLOGY CLINIC | Facility: CLINIC | Age: 79
End: 2019-08-14
Payer: MEDICARE

## 2019-08-14 VITALS
HEIGHT: 70 IN | DIASTOLIC BLOOD PRESSURE: 60 MMHG | WEIGHT: 225 LBS | BODY MASS INDEX: 32.21 KG/M2 | SYSTOLIC BLOOD PRESSURE: 148 MMHG | HEART RATE: 54 BPM

## 2019-08-14 DIAGNOSIS — I25.10 CORONARY ARTERY DISEASE INVOLVING NATIVE CORONARY ARTERY OF NATIVE HEART WITHOUT ANGINA PECTORIS: ICD-10-CM

## 2019-08-14 DIAGNOSIS — I48.91 ATRIAL FIBRILLATION, UNSPECIFIED TYPE (HCC): Primary | ICD-10-CM

## 2019-08-14 PROCEDURE — 99214 OFFICE O/P EST MOD 30 MIN: CPT | Performed by: INTERNAL MEDICINE

## 2019-08-14 PROCEDURE — 93000 ELECTROCARDIOGRAM COMPLETE: CPT | Performed by: INTERNAL MEDICINE

## 2019-08-14 RX ORDER — FUROSEMIDE 40 MG/1
40 TABLET ORAL DAILY
Refills: 0 | COMMUNITY
Start: 2019-07-13 | End: 2021-07-02 | Stop reason: SDUPTHER

## 2019-08-14 RX ORDER — ATORVASTATIN CALCIUM 80 MG/1
TABLET, FILM COATED ORAL
Refills: 2 | COMMUNITY
Start: 2019-07-16 | End: 2019-08-14 | Stop reason: SDUPTHER

## 2019-08-14 RX ORDER — ATORVASTATIN CALCIUM 40 MG/1
40 TABLET, FILM COATED ORAL DAILY
Qty: 90 TABLET | Refills: 3 | Status: SHIPPED | OUTPATIENT
Start: 2019-08-14 | End: 2021-07-21 | Stop reason: SDUPTHER

## 2019-08-14 NOTE — PROGRESS NOTES
Cardiology Follow Up    Pippa Montano  1940  [id number]  [address]  [address] [id number] [phone]    1  Atrial fibrillation, unspecified type ([facility] )  POCT ECG       Interval History: Followup PAF    Doing well  No symptomatic afib  Trace edema  Problem List     Paroxysmal atrial fibrillation ([facility] )    Coronary artery disease involving native coronary artery of native heart without angina pectoris    Overview Signed 7/5/2018  4:08 PM by Nish Torres MD     S/P CABG-2016 [facility] hypertension    S/P aortic valve replacement with bioprosthetic valve    Benign prostatic hyperplasia without lower urinary tract symptoms    Gastroesophageal reflux disease without esophagitis    Stage III chronic kidney disease ([facility] )    Anticoagulation adequate        Past Medical History:   Diagnosis Date    Atrial fibrillation (HCC)     BPH (benign prostatic hyperplasia)     CHF (congestive heart failure) (HCC)     GERD (gastroesophageal reflux disease)     Skin cancer     Sleep apnea      Social History     Socioeconomic History    Marital status: /Civil Union     Spouse name: Not on file    Number of children: Not on file    Years of education: Not on file    Highest education level: Not on file   Occupational History    Not on file   Social Needs    Financial resource strain: Not on file    Food insecurity:     Worry: Not on file     Inability: Not on file    Transportation needs:     Medical: Not on file     Non-medical: Not on file   Tobacco Use    Smoking status: Never Smoker    Smokeless tobacco: Never Used   Substance and Sexual Activity    Alcohol use:  Yes    Drug use: No    Sexual activity: Not on file   Lifestyle    Physical activity:     Days per week: Not on file     Minutes per session: Not on file    Stress: Not on file   Relationships    Social connections: Talks on phone: Not on file     Gets together: Not on file     Attends Tenriism service: Not on file     Active member of club or organization: Not on file     Attends meetings of clubs or organizations: Not on file     Relationship status: Not on file    Intimate partner violence:     Fear of current or ex partner: Not on file     Emotionally abused: Not on file     Physically abused: Not on file     Forced sexual activity: Not on file   Other Topics Concern    Not on file   Social History Narrative    Not on file      Family History   Problem Relation Age of Onset    Cancer Mother     Dementia Father      Past Surgical History:   Procedure Laterality Date    CARDIAC SURGERY      CHOLECYSTECTOMY      CORONARY ARTERY BYPASS GRAFT      CORONARY STENT PLACEMENT      TONSILLECTOMY      VALVE REPLACEMENT         Current Outpatient Medications:     famotidine (PEPCID) 20 mg tablet, Take 1 tablet (20 mg total) by mouth 2 (two) times a day, Disp: 180 tablet, Rfl: 3    furosemide (LASIX) 40 mg tablet, Take 40 mg by mouth daily , Disp: , Rfl: 0    metoprolol tartrate (LOPRESSOR) 25 mg tablet, Take 0 5 tablets (12 5 mg total) by mouth every 12 (twelve) hours, Disp: , Rfl: 0    Potassium Chloride (KLOR-CON 10 PO), Take 1 tablet by mouth 2 (two) times a day, Disp: , Rfl:     warfarin (COUMADIN) 5 mg tablet, Take 0 5 tablets (2 5 mg total) by mouth daily Take 5mg Tuesday and Thursday and 2 5mg all other days; repeat INR Tuesday 8/28, Disp: , Rfl: 0    atorvastatin (LIPITOR) 80 mg tablet, TAKE ONE TABLET BY MOUTH IN THE EVENING, PLEASE SCHEDULE AN APPOINTMENT FOR MEDICATION REFILLS, Disp: , Rfl: 2    tamsulosin (FLOMAX) 0 4 mg, Take 1 capsule (0 4 mg total) by mouth daily with dinner (Patient not taking: Reported on 8/14/2019), Disp: 90 capsule, Rfl: 1    traMADol (ULTRAM) 50 mg tablet, Take by mouth, Disp: , Rfl:   No Known Allergies    Labs:     Chemistry        Component Value Date/Time    K 3 9 09/04/2018 1512     09/04/2018 1512    CO2 31 09/04/2018 1512    BUN 17 09/04/2018 1512    CREATININE 1 34 (H) 09/04/2018 1512        Component Value Date/Time    CALCIUM 8 9 09/04/2018 1512    ALKPHOS 69 09/04/2018 1512    AST 30 09/04/2018 1512    ALT 25 09/04/2018 1512            No results found for: CHOL  No results found for: HDL  No results found for: LDLCALC  No results found for: TRIG  No results found for: CHOLHDL    Imaging: No results found  EKG: Sinus Bradycardia  Review of Systems   Constitution: Negative  HENT: Negative  Eyes: Negative  Cardiovascular: Positive for leg swelling  Respiratory: Negative  Endocrine: Negative  Hematologic/Lymphatic: Negative  Skin: Negative  Musculoskeletal: Negative  Gastrointestinal: Negative  Genitourinary: Negative  Neurological: Negative  Psychiatric/Behavioral: Negative  Allergic/Immunologic: Negative  Vitals:    08/14/19 1202   BP: 148/60   Pulse: (!) 54           Physical Exam   Constitutional: He is oriented to person, place, and time  No distress  HENT:   Mouth/Throat: No oropharyngeal exudate  Eyes: No scleral icterus  Neck: No JVD present  Cardiovascular: Normal rate and regular rhythm  No murmur heard  Pulmonary/Chest: Effort normal and breath sounds normal  No stridor  No respiratory distress  He has no wheezes  Abdominal: Soft  Bowel sounds are normal  He exhibits no distension  There is no tenderness  There is no guarding  Musculoskeletal: He exhibits no edema  Neurological: He is alert and oriented to person, place, and time  Skin: Skin is warm and dry  He is not diaphoretic  Psychiatric: He has a normal mood and affect  His behavior is normal        Discussion/Summary:    Paroxysmal Atrial Fibrillation: S/P PVI   Doing well  Continue metoprolol and warfarin  INRs stable  2 4 yesterday  CAD s/p 3 vessel CABG: Continue med rx  No angina  Restart statin   Check lipids and CMP after a few weeks  Bio AVR: increased flow below valve and stable gradients  The patient was counseled regarding diagnostic results, instructions for management, risk factor reductions, impressions  total time of encounter was 25 minutes and 15 minutes was spent counseling

## 2019-08-15 ENCOUNTER — APPOINTMENT (OUTPATIENT)
Dept: LAB | Facility: HOSPITAL | Age: 79
End: 2019-08-15
Attending: INTERNAL MEDICINE
Payer: MEDICARE

## 2019-08-15 DIAGNOSIS — I25.10 CORONARY ARTERY DISEASE INVOLVING NATIVE CORONARY ARTERY OF NATIVE HEART WITHOUT ANGINA PECTORIS: ICD-10-CM

## 2019-08-15 LAB
ALBUMIN SERPL BCP-MCNC: 3.1 G/DL (ref 3.5–5)
ALP SERPL-CCNC: 85 U/L (ref 46–116)
ALT SERPL W P-5'-P-CCNC: 18 U/L (ref 12–78)
ANION GAP SERPL CALCULATED.3IONS-SCNC: 7 MMOL/L (ref 4–13)
AST SERPL W P-5'-P-CCNC: 23 U/L (ref 5–45)
BILIRUB SERPL-MCNC: 0.7 MG/DL (ref 0.2–1)
BUN SERPL-MCNC: 19 MG/DL (ref 5–25)
CALCIUM SERPL-MCNC: 9 MG/DL (ref 8.3–10.1)
CHLORIDE SERPL-SCNC: 102 MMOL/L (ref 100–108)
CHOLEST SERPL-MCNC: 205 MG/DL (ref 50–200)
CO2 SERPL-SCNC: 32 MMOL/L (ref 21–32)
CREAT SERPL-MCNC: 1.22 MG/DL (ref 0.6–1.3)
GFR SERPL CREATININE-BSD FRML MDRD: 56 ML/MIN/1.73SQ M
GLUCOSE P FAST SERPL-MCNC: 93 MG/DL (ref 65–99)
HDLC SERPL-MCNC: 67 MG/DL (ref 40–60)
LDLC SERPL CALC-MCNC: 112 MG/DL (ref 0–100)
POTASSIUM SERPL-SCNC: 4.2 MMOL/L (ref 3.5–5.3)
PROT SERPL-MCNC: 6.6 G/DL (ref 6.4–8.2)
SODIUM SERPL-SCNC: 141 MMOL/L (ref 136–145)
TRIGL SERPL-MCNC: 131 MG/DL

## 2019-08-15 PROCEDURE — 80061 LIPID PANEL: CPT

## 2019-08-15 PROCEDURE — 80053 COMPREHEN METABOLIC PANEL: CPT | Performed by: INTERNAL MEDICINE

## 2019-08-15 PROCEDURE — 36415 COLL VENOUS BLD VENIPUNCTURE: CPT | Performed by: INTERNAL MEDICINE

## 2019-08-20 ENCOUNTER — TELEPHONE (OUTPATIENT)
Dept: CARDIOLOGY CLINIC | Facility: CLINIC | Age: 79
End: 2019-08-20

## 2019-08-20 NOTE — TELEPHONE ENCOUNTER
Called pt - message relayed as given   Last OV and lipids routed to Dr Karla Omer [facility]) per pt request

## 2019-08-20 NOTE — TELEPHONE ENCOUNTER
----- Message from Grace Johnson sent at 8/19/2019  2:54 PM EDT -----      ----- Message -----  From: Krys Montenegro MD  Sent: 8/16/2019  11:01 AM EDT  To: Cardiology [place] [facility]    I would recheck cholesterol with his [facility] cardiologist to give statin more time to take effect

## 2020-02-28 DIAGNOSIS — K21.9 GASTROESOPHAGEAL REFLUX DISEASE, ESOPHAGITIS PRESENCE NOT SPECIFIED: ICD-10-CM

## 2020-02-28 RX ORDER — FAMOTIDINE 20 MG/1
TABLET, FILM COATED ORAL
Qty: 180 TABLET | Refills: 0 | Status: SHIPPED | OUTPATIENT
Start: 2020-02-28 | End: 2021-07-21 | Stop reason: SDUPTHER

## 2021-06-21 ENCOUNTER — OFFICE VISIT (OUTPATIENT)
Dept: FAMILY MEDICINE CLINIC | Facility: HOSPITAL | Age: 81
End: 2021-06-21
Payer: MEDICARE

## 2021-06-21 ENCOUNTER — HOSPITAL ENCOUNTER (OUTPATIENT)
Dept: RADIOLOGY | Facility: HOSPITAL | Age: 81
Discharge: HOME | End: 2021-06-21
Attending: INTERNAL MEDICINE
Payer: MEDICARE

## 2021-06-21 VITALS
DIASTOLIC BLOOD PRESSURE: 70 MMHG | HEIGHT: 70 IN | SYSTOLIC BLOOD PRESSURE: 120 MMHG | BODY MASS INDEX: 33.07 KG/M2 | WEIGHT: 231 LBS | HEART RATE: 73 BPM

## 2021-06-21 DIAGNOSIS — Z95.3 S/P AORTIC VALVE REPLACEMENT WITH BIOPROSTHETIC VALVE: ICD-10-CM

## 2021-06-21 DIAGNOSIS — I48.0 PAROXYSMAL ATRIAL FIBRILLATION (HCC): Primary | ICD-10-CM

## 2021-06-21 DIAGNOSIS — I10 ESSENTIAL HYPERTENSION: ICD-10-CM

## 2021-06-21 DIAGNOSIS — M79.672 HEEL PAIN, BILATERAL: ICD-10-CM

## 2021-06-21 DIAGNOSIS — I25.10 CORONARY ARTERY DISEASE INVOLVING NATIVE CORONARY ARTERY OF NATIVE HEART WITHOUT ANGINA PECTORIS: ICD-10-CM

## 2021-06-21 DIAGNOSIS — M79.671 HEEL PAIN, BILATERAL: ICD-10-CM

## 2021-06-21 DIAGNOSIS — R27.0 ATAXIA: ICD-10-CM

## 2021-06-21 DIAGNOSIS — R26.89 BALANCE DISORDER: ICD-10-CM

## 2021-06-21 PROCEDURE — 73630 X-RAY EXAM OF FOOT: CPT

## 2021-06-21 PROCEDURE — 99215 OFFICE O/P EST HI 40 MIN: CPT | Performed by: INTERNAL MEDICINE

## 2021-06-21 RX ORDER — POTASSIUM CHLORIDE 750 MG/1
10 TABLET, FILM COATED, EXTENDED RELEASE ORAL DAILY
COMMUNITY
End: 2021-06-21 | Stop reason: DRUGHIGH

## 2021-06-21 RX ORDER — PYRIDOXINE HCL (VITAMIN B6) 100 MG
120 TABLET ORAL
COMMUNITY

## 2021-06-21 NOTE — PROGRESS NOTES
Assessment/Plan:       Diagnoses and all orders for this visit:    Paroxysmal atrial fibrillation [facility])  -     Ambulatory referral to Cardiology; Future  -     TSH, 3rd generation; Future    Coronary artery disease involving native coronary artery of native heart without angina pectoris  -     CBC; Future  -     Comprehensive metabolic panel; Future  -     Lipid Panel with Direct LDL reflex; Future    S/P aortic valve replacement with bioprosthetic valve    Essential hypertension    Balance disorder  -     CBC; Future  -     Comprehensive metabolic panel; Future  -     TSH, 3rd generation; Future  -     Lipid Panel with Direct LDL reflex; Future  -     CT head wo contrast; Future  -     Ambulatory referral to Physical Therapy; Future    Ataxia  -     CT head wo contrast; Future  -     Ambulatory referral to Physical Therapy; Future    Heel pain, bilateral  -     XR foot 3+ vw left; Future  -     XR foot 3+ vw right; Future  -     Ambulatory referral to Podiatry; Future    Other orders  -     Discontinue: potassium chloride (Klor-Con) 10 mEq tablet; Take 10 mEq by mouth daily  -     pyridoxine (B-6) 100 MG tablet; Take 120 mg by mouth          All of the above diagnoses have been assessed  Additional COMMENTS/PLAN:  Will refer back to Cardiology to reassess his need for ongoing anticoagulation since he is 3 years past ablation  This apt took 40 min      Subjective:      Patient ID: Lizeth Gupta is a [de-identified] y o  male  HPI     Coronary artery disease-The patient denies CP, SOB or palpitations  The patient is compliant with antiplatelet and secondary prevention regimens  Paroxysmal atrial fibrillation-patient currently has a rhythm control strategy  Patient denies any palpitations, lightheadedness or dizziness  Appropriate anticoagulation therapy is applied  Pt had ablation  Still on anticoagulation  Hyperlipidemia- The patient is compliant with diet and taking meds   The patient understands the efficacy of the treatment in vascular prevention  The following portions of the patient's history were revised and updated as appropriate: Problem list, allergies, med list, FH, SH, Past medical and surgical histories  Current Outpatient Medications   Medication Sig Dispense Refill    atorvastatin (LIPITOR) 40 mg tablet Take 1 tablet (40 mg total) by mouth daily 90 tablet 3    famotidine (PEPCID) 20 mg tablet TAKE 1 TABLET BY MOUTH TWICE A  tablet 0    furosemide (LASIX) 40 mg tablet Take 40 mg by mouth daily   0    metoprolol tartrate (LOPRESSOR) 25 mg tablet Take 0 5 tablets (12 5 mg total) by mouth every 12 (twelve) hours  0    Potassium Chloride (KLOR-CON 10 PO) Take 1 tablet by mouth 2 (two) times a day      pyridoxine (B-6) 100 MG tablet Take 120 mg by mouth      traMADol (ULTRAM) 50 mg tablet Take by mouth      warfarin (COUMADIN) 5 mg tablet Take 0 5 tablets (2 5 mg total) by mouth daily Take 5mg Tuesday and Thursday and 2 5mg all other days; repeat INR Tuesday 8/28  0     No current facility-administered medications for this visit  Review of Systems   Musculoskeletal:        Has pain in both ankles/achilles tendons   Neurological:        Having some balance issues for the last 6 months  Particularly when in shower  All other systems reviewed and are negative  Objective:    /70 (BP Location: Left arm, Patient Position: Sitting, Cuff Size: Standard)   Pulse 73   Ht 5' 10" (1 778 m)   Wt 105 kg (231 lb)   BMI 33 15 kg/m²     BP Readings from Last 3 Encounters:   06/21/21 120/70   08/14/19 148/60   06/17/19 130/70                  Wt Readings from Last 3 Encounters:   06/21/21 105 kg (231 lb)   08/14/19 102 kg (225 lb)   06/17/19 102 kg (224 lb)         Physical Exam  Vitals reviewed  Constitutional:       Appearance: He is obese  Neck:      Vascular: No carotid bruit        Comments: Transmitted murmur  Cardiovascular:      Rate and Rhythm: Normal rate and regular rhythm  Heart sounds: Murmur heard  Pulmonary:      Effort: Pulmonary effort is normal       Breath sounds: Normal breath sounds  Abdominal:      General: Abdomen is flat  Bowel sounds are normal       Palpations: Abdomen is soft  Musculoskeletal:      Comments: Venous stasis changes with +1 edema    Tenderness on both posterior heels   Neurological:      General: No focal deficit present  Mental Status: He is alert and oriented to person, place, and time  Cranial Nerves: No cranial nerve deficit  Coordination: Coordination normal       Comments: Gait appears to be normal however stress gait is ataxic  The positive Romberg  No visits with results within 2 Week(s) from this visit  Latest known visit with results is:   Appointment on 08/15/2019   Component Date Value Ref Range Status    Cholesterol 08/15/2019 205* 50 - 200 mg/dL Final      Cholesterol:       Desirable         <200 mg/dl       Borderline         200-239 mg/dl       High              >239           Triglycerides 08/15/2019 131  <=150 mg/dL Final      Triglyceride:     Normal          <150 mg/dl     Borderline High 150-199 mg/dl     High            200-499 mg/dl        Very High       >499 mg/dl    Specimen collection should occur prior to N-Acetylcysteine or Metamizole administration due to the potential for falsely depressed results   HDL, Direct 08/15/2019 67* 40 - 60 mg/dL Final      HDL Cholesterol:       High    >60 mg/dL       Low     <41 mg/dL  Specimen collection should occur prior to Metamizole administration due to the potential for falsley depressed results   LDL Calculated 08/15/2019 112* 0 - 100 mg/dL Final      LDL Cholesterol:     Optimal           <100 mg/dl     Near Optimal      100-129 mg/dl     Above Optimal       Borderline High 130-159 mg/dl       High            160-189 mg/dl       Very High       >189 mg/dl         This screening LDL is a calculated result     It does not have the accuracy of the Direct Measured LDL in the monitoring of patients with hyperlipidemia and/or statin therapy  Direct Measure LDL ([id number]) must be ordered separately in these patients  Debra Hardin MD    Some or all of this note was generated with a voice recognition dictation system and therefore my contain grammatical or spelling errors  BMI Counseling: Body mass index is 33 15 kg/m²  The BMI is above normal  Nutrition recommendations include reducing portion sizes

## 2021-06-24 ENCOUNTER — LAB (OUTPATIENT)
Dept: LAB | Facility: HOSPITAL | Age: 81
End: 2021-06-24
Attending: INTERNAL MEDICINE
Payer: MEDICARE

## 2021-06-24 DIAGNOSIS — I25.10 CORONARY ARTERY DISEASE INVOLVING NATIVE CORONARY ARTERY OF NATIVE HEART WITHOUT ANGINA PECTORIS: ICD-10-CM

## 2021-06-24 DIAGNOSIS — I48.0 PAROXYSMAL ATRIAL FIBRILLATION (HCC): ICD-10-CM

## 2021-06-24 DIAGNOSIS — R26.89 BALANCE DISORDER: ICD-10-CM

## 2021-06-24 DIAGNOSIS — I25.10 CORONARY ARTERY DISEASE INVOLVING NATIVE CORONARY ARTERY OF NATIVE HEART WITHOUT ANGINA PECTORIS: Primary | ICD-10-CM

## 2021-06-24 LAB
ALBUMIN SERPL BCP-MCNC: 3.3 G/DL (ref 3.5–5)
ALP SERPL-CCNC: 135 U/L (ref 46–116)
ALT SERPL W P-5'-P-CCNC: 43 U/L (ref 12–78)
ANION GAP SERPL CALCULATED.3IONS-SCNC: 6 MMOL/L (ref 4–13)
AST SERPL W P-5'-P-CCNC: 48 U/L (ref 5–45)
BILIRUB SERPL-MCNC: 0.78 MG/DL (ref 0.2–1)
BUN SERPL-MCNC: 14 MG/DL (ref 5–25)
CALCIUM ALBUM COR SERPL-MCNC: 9.5 MG/DL (ref 8.3–10.1)
CALCIUM SERPL-MCNC: 8.9 MG/DL (ref 8.3–10.1)
CHLORIDE SERPL-SCNC: 103 MMOL/L (ref 100–108)
CHOLEST SERPL-MCNC: 167 MG/DL (ref 50–200)
CO2 SERPL-SCNC: 31 MMOL/L (ref 21–32)
CREAT SERPL-MCNC: 1.13 MG/DL (ref 0.6–1.3)
ERYTHROCYTE [DISTWIDTH] IN BLOOD BY AUTOMATED COUNT: 12.2 % (ref 11.6–15.1)
FOLATE SERPL-MCNC: 2.7 NG/ML (ref 3.1–17.5)
GFR SERPL CREATININE-BSD FRML MDRD: 61 ML/MIN/1.73SQ M
GLUCOSE P FAST SERPL-MCNC: 107 MG/DL (ref 65–99)
HCT VFR BLD AUTO: 40.7 % (ref 36.5–49.3)
HDLC SERPL-MCNC: 97 MG/DL
HGB BLD-MCNC: 13.6 G/DL (ref 12–17)
LDLC SERPL CALC-MCNC: 57 MG/DL (ref 0–100)
MCH RBC QN AUTO: 40.6 PG (ref 26.8–34.3)
MCHC RBC AUTO-ENTMCNC: 33.4 G/DL (ref 31.4–37.4)
MCV RBC AUTO: 122 FL (ref 82–98)
PLATELET # BLD AUTO: 175 THOUSANDS/UL (ref 149–390)
PMV BLD AUTO: 10.7 FL (ref 8.9–12.7)
POTASSIUM SERPL-SCNC: 3.8 MMOL/L (ref 3.5–5.3)
PROT SERPL-MCNC: 6.9 G/DL (ref 6.4–8.2)
RBC # BLD AUTO: 3.35 MILLION/UL (ref 3.88–5.62)
SODIUM SERPL-SCNC: 140 MMOL/L (ref 136–145)
TRIGL SERPL-MCNC: 66 MG/DL
TSH SERPL DL<=0.05 MIU/L-ACNC: 3.51 UIU/ML (ref 0.36–3.74)
VIT B12 SERPL-MCNC: 297 PG/ML (ref 100–900)
WBC # BLD AUTO: 5.74 THOUSAND/UL (ref 4.31–10.16)

## 2021-06-24 PROCEDURE — 82746 ASSAY OF FOLIC ACID SERUM: CPT

## 2021-06-24 PROCEDURE — 36415 COLL VENOUS BLD VENIPUNCTURE: CPT

## 2021-06-24 PROCEDURE — 84443 ASSAY THYROID STIM HORMONE: CPT

## 2021-06-24 PROCEDURE — 85027 COMPLETE CBC AUTOMATED: CPT

## 2021-06-24 PROCEDURE — 80061 LIPID PANEL: CPT

## 2021-06-24 PROCEDURE — 80053 COMPREHEN METABOLIC PANEL: CPT

## 2021-06-24 PROCEDURE — 82607 VITAMIN B-12: CPT

## 2021-06-25 DIAGNOSIS — E53.8 FOLATE DEFICIENCY: Primary | ICD-10-CM

## 2021-06-25 RX ORDER — FOLIC ACID 1 MG/1
1 TABLET ORAL DAILY
Qty: 30 TABLET | Refills: 5 | Status: SHIPPED | OUTPATIENT
Start: 2021-06-25 | End: 2021-12-08 | Stop reason: SDUPTHER

## 2021-06-28 ENCOUNTER — EVALUATION (OUTPATIENT)
Dept: PHYSICAL THERAPY | Facility: CLINIC | Age: 81
End: 2021-06-28
Payer: MEDICARE

## 2021-06-28 ENCOUNTER — TELEPHONE (OUTPATIENT)
Dept: FAMILY MEDICINE CLINIC | Facility: HOSPITAL | Age: 81
End: 2021-06-28

## 2021-06-28 DIAGNOSIS — R55 SYNCOPE AND COLLAPSE: ICD-10-CM

## 2021-06-28 DIAGNOSIS — R27.0 ATAXIA: ICD-10-CM

## 2021-06-28 DIAGNOSIS — H53.9 VISION DISTURBANCE: Primary | ICD-10-CM

## 2021-06-28 DIAGNOSIS — H53.123 TRANSIENT VISUAL LOSS, BILATERAL: ICD-10-CM

## 2021-06-28 DIAGNOSIS — R26.89 BALANCE DISORDER: ICD-10-CM

## 2021-06-28 PROCEDURE — 97163 PT EVAL HIGH COMPLEX 45 MIN: CPT

## 2021-06-28 NOTE — PROGRESS NOTES
PT Evaluation     Today's date: 2021  Patient name: Radha Frausto  : 1940  MRN: [id number]  Referring provider: Agus Powell MD  Dx:   Encounter Diagnosis     ICD-10-CM    1  Balance disorder  R26 89 Ambulatory referral to Physical Therapy     PT plan of care cert/re-cert   2  Ataxia  R27 0 Ambulatory referral to Physical Therapy     PT plan of care cert/re-cert       Start Time: 0400  Stop Time: 0515  Total time in clinic (min): 75 minutes    Assessment  Assessment details: Patient presents to PT with 5-6 month history of spinning sensation with getting out of bed  Patient denies any dizziness with rapid head motions, however, demonstrates increased caution with changes in position particularly with sit to stand  No near falls or falls reported  Positional testing (+) for BPPV with DHP on left with upbeating fatiguing nystagmus and dizziness noted  In addition, proximal LE weakness is noted with increased edema B/L LE, instability in gait with standardized balance testing scoring 42/56 on DANIELS and 17/30 on FGA  Endurance testing also significant for 5x sit to stand scoring 31 7sec  Patient will benefit from skilled intervention to facilitate resolution of BPPV, determination of any habituation needs and further strengthening and balance training to address his limitations  Patient is in agreement with treatment plans and is anticipated to attain all goals as noted below  Thank you for this referral   Impairments: abnormal coordination, abnormal gait, activity intolerance, impaired balance and impaired physical strength    Goals  STG  (4weeks)  Resolve BPPV on positional testing and determine need for habituation exercises                            Improve FGA scoring by 6 points to demonstrate increased stability in gait, scoring 23/30                            Increase endurance scoring by 3 seconds to attain 28  4sec to improve mobility tolerance                            Patient will be assessed on TUG and 6 min walk tests with goals to follow                           Patient will be independent in generalized strengthening exercises to promote increased proximal LE strength  LTG  (8weeks)   Patient will be able to complete 6 min walk test with 190' improvement demonstrating minimal improvement for endurance scoring with distance TBD                            Patient will demonstrates improved DANIELS scoring to 56/56 demonstrating increased overall stability for all functional tasks                            Patient will be independent in walking program for home                            Patient will demonstrate independent community ambulation on uneven surfaces without LOB noted                            Patient will report no dizziness with changes in position    Plan  Patient would benefit from: skilled physical therapy  Planned therapy interventions: canalith repositioning, ADL retraining, manual therapy, neuromuscular re-education, balance, patient education, postural training, coordination, strengthening, stretching, therapeutic activities, therapeutic exercise, home exercise program and gait training  Frequency: 2x week  Duration in weeks: 8  Treatment plan discussed with: patient and family        Subjective Evaluation    History of Present Illness  Mechanism of injury: Patient admits to feeling dizzy when getting out of bed in the morning  Patient admits to intermittent spinning noticed most with getting up out of bed for past 4-5 months  Patient admits to bone spurs in B/L achilles with pain noted with prolonged ambulation and stance  CT of brain scheduled for tomorrow  Patient denies aural fullness but admits to tinnitus  He has noticed increased caution with sit to stand and feeling unstable with gait on outside surfaces  No falls noted or near falls reported    Pain  Current pain ratin  At best pain ratin  At worst pain ratin  Location: heel pain B/L    Patient Goals  Patient goals for therapy: improved balance, independence with ADLs/IADLs and return to sport/leisure activities  Patient goal: to get rid of the spinning feeling and be able to return to previous ADLs        Objective     Concurrent Complaints  Positive for tinnitus, visual change and hearing loss  Negative for headaches, nausea/motion sickness, aural fullness and poor concentration    Strength/Myotome Testing     Left Shoulder     Planes of Motion   Flexion: 4+   Abduction: 4+     Right Shoulder     Planes of Motion   Flexion: 4+   Abduction: 4+     Left Elbow   Flexion: 4+  Extension: 4+    Right Elbow   Flexion: 4+  Extension: 4+    Left Wrist/Hand   Wrist extension: 4+  Wrist flexion: 4+    Right Wrist/Hand   Wrist extension: 4+  Wrist flexion: 4+    Left Hip   Planes of Motion   Flexion: 4-  Extension: 3+  Abduction: 4-    Right Hip   Planes of Motion   Flexion: 4-  Extension: 3+  Abduction: 4-    Left Knee   Flexion: 4  Extension: 4    Right Knee   Flexion: 4  Extension: 4    Left Ankle/Foot   Dorsiflexion: 3+  Plantar flexion: 3+    Right Ankle/Foot   Dorsiflexion: 3+  Plantar flexion: 3+  Neuro Exam:     Dizziness  Positive for disequilibrium and light-headedness  Negative for vertigo, oscillopsia, motion sickness, rocking or swaying, diplopia and floating or swimming  Exacerbating factors  Positive for bending over and supine to/from sitting  Negative for rolling in bed, looking up, walking, turning head, optokinetic movement and walking in busy environment  Symptoms   Duration: seconds  Frequency: intermittent  Intensity at best: 0/10  Intensity at worst: 7/10  Average intensity: 7/10    Headaches   Patient reports headaches: No      Cervical exam   Ligament Laxity Testing   Alar ligament: WNL  Sharp Jodi:  WNL  Modified VBI   Left: asymptomatic  Right: asymptomatic  Seated posture: forward head posture and internally rotated shoulders    Oculomotor exam   Oculomotor ROM: WNL  Resting nystagmus: not present   Gaze holding nystagmus: not present left  and not present right  Smooth pursuits: saccadic smooth pursuit  Convergence: normal    Positional testing   Pancho-Hallpike   Left posterior canal: symptomatic and upbeating  Positional testing comment: Unilateral stance 2sec left and right, Romberg EO (-), EC (+) 10seconds  Unable to attain sharpened romberg without external support and unable to hold position  Sensation   Light touch LE: left WNL and right WNL    Coordination   Heel to shin: left WNL and right WNL  Finger to nose: left WNL and right WNL  Rapid alternating movements: UE impaired and LE impaired     Functional outcomes   Functional outcome gait comment: Increased base of support with slow robert, decreased LE clearance in swing  CERVICAL SOFT TISSUE:  ROM restricted for B/L cervical rotation right 1/2 range, left 3/4 range  Increased soft tissue tightness noted B/L Scalenes, suboccipital guarding and UT tightness  SKIN:  Significant edema noted B/L LE  Discussed compression stockings           Precautions: h/o ablation, h/o valve replacement, CHF, h/o cardiac stent, h/o GERD, sleep apnea, h/o cholecystomy, h/o skin CA  EPOC:  8/23/2021      TESTING 6/28            5x sit to stand 31 7sec            FGA 17/30            DANIELS 42/56            6 min walk test TBA            TUG TBA            Neuro Re-Ed             CRT left VM            Turner's             EO/EC             HT FT             Tandem stance             Step taps             Unilateral hold             Ther Ex             Ankle pumps             Sit to stand                                                                                           Ther Activity                                       Gait Training                                       Modalities

## 2021-06-28 NOTE — TELEPHONE ENCOUNTER
----- Message from Vivian Thompson MD sent at 6/26/2021  8:27 AM EDT -----  Regarding: RE: vision  Please also order carotid ultrasound  ----- Message -----  From: Jerri Robles MD  Sent: 6/25/2021   4:17 PM EDT  To: Vivian Thompson MD  Subject: vision                                           Hi, pt's ophthalmologist called and said that pt had complained to him about intermittent dark vision in his one eye that he couldn't account for on exam and wanted to let us know   I see you already ordered a head ct  Lei Ore

## 2021-06-29 ENCOUNTER — OFFICE VISIT (OUTPATIENT)
Dept: PHYSICAL THERAPY | Facility: CLINIC | Age: 81
End: 2021-06-29
Payer: MEDICARE

## 2021-06-29 DIAGNOSIS — R26.89 BALANCE DISORDER: Primary | ICD-10-CM

## 2021-06-29 DIAGNOSIS — R27.0 ATAXIA: ICD-10-CM

## 2021-06-29 PROCEDURE — 97112 NEUROMUSCULAR REEDUCATION: CPT

## 2021-06-29 NOTE — PROGRESS NOTES
Daily Note     Today's date: 2021  Patient name: Maryellen Pride  : 1940  MRN: [id number]  Referring provider: Roe Clayton MD  Dx:   Encounter Diagnosis     ICD-10-CM    1  Balance disorder  R26 89    2  Ataxia  R27 0        Start Time: 46  Stop Time: 0  Total time in clinic (min): 45 minutes    Subjective:  Patient indicates he experienced intermittent dizziness with rolling over in bed and with reaching down to the floor for a pen  No present dizziness sitting  Objective: See treatment diary below      Assessment:   DHP left reassessed with dizziness and slight nystagmus noted, decreased from previous  CRT performed with difficulty rolling completely to finish Epley maneuver  Repeated on lower mat with improved tolerance  No nystagmus or dizziness on second attempt  Instructed in Kit Sexton exercise to continue at home with good demonstration  Written instructions given  Patient will be reassessed for BPPV on return with details to follow  Plan: Continue PT per POC  No complaints at end of session       Precautions: h/o ablation, h/o valve replacement, CHF, h/o cardiac stent, h/o GERD, sleep apnea, h/o cholecystomy, h/o skin CA  EPOC:  2021      TESTING            5x sit to stand 31 7sec            FGA             DANIELS 42/56            6 min walk test TBA            TUG TBA            Neuro Re-Ed             CRT left VM VM x2           Turner's  3x2           EO/EC             HT FT             Tandem stance             Step taps             Unilateral hold             Ther Ex             Ankle pumps             Sit to stand                                                                                           Ther Activity                                       Gait Training                                       Modalities

## 2021-06-30 ENCOUNTER — HOSPITAL ENCOUNTER (OUTPATIENT)
Dept: CT IMAGING | Facility: HOSPITAL | Age: 81
Discharge: HOME | End: 2021-06-30
Attending: INTERNAL MEDICINE
Payer: MEDICARE

## 2021-06-30 ENCOUNTER — HOSPITAL ENCOUNTER (OUTPATIENT)
Dept: ULTRASOUND IMAGING | Facility: HOSPITAL | Age: 81
Discharge: HOME | End: 2021-06-30
Attending: INTERNAL MEDICINE
Payer: MEDICARE

## 2021-06-30 DIAGNOSIS — R27.0 ATAXIA: ICD-10-CM

## 2021-06-30 DIAGNOSIS — R79.89 ABNORMAL LIVER FUNCTION TEST: ICD-10-CM

## 2021-06-30 DIAGNOSIS — R26.89 BALANCE DISORDER: ICD-10-CM

## 2021-06-30 PROCEDURE — 76705 ECHO EXAM OF ABDOMEN: CPT

## 2021-06-30 PROCEDURE — 70450 CT HEAD/BRAIN W/O DYE: CPT

## 2021-06-30 PROCEDURE — G1004 CDSM NDSC: HCPCS

## 2021-07-02 ENCOUNTER — OFFICE VISIT (OUTPATIENT)
Dept: PHYSICAL THERAPY | Facility: CLINIC | Age: 81
End: 2021-07-02
Payer: MEDICARE

## 2021-07-02 DIAGNOSIS — R27.0 ATAXIA: ICD-10-CM

## 2021-07-02 DIAGNOSIS — R26.89 BALANCE DISORDER: Primary | ICD-10-CM

## 2021-07-02 DIAGNOSIS — I48.0 PAROXYSMAL ATRIAL FIBRILLATION (HCC): Primary | ICD-10-CM

## 2021-07-02 PROCEDURE — 97112 NEUROMUSCULAR REEDUCATION: CPT

## 2021-07-02 PROCEDURE — 97110 THERAPEUTIC EXERCISES: CPT

## 2021-07-02 RX ORDER — FUROSEMIDE 40 MG/1
40 TABLET ORAL 2 TIMES DAILY
Qty: 60 TABLET | Refills: 5 | Status: SHIPPED | OUTPATIENT
Start: 2021-07-02 | End: 2021-09-28

## 2021-07-02 NOTE — PROGRESS NOTES
Daily Note     Today's date: 2021  Patient name: Caprice Noonan  : 1940  MRN: [id number]  Referring provider: Clarisse Myers MD  Dx:   Encounter Diagnosis     ICD-10-CM    1  Balance disorder  R26 89    2  Ataxia  R27 0        Start Time: 0200  Stop Time: 240  Total time in clinic (min): 40 minutes    Subjective:   Patient without any complaints  He states he has not had any dizzy episodes and in general is feeling much better  Objective: See treatment diary below      Assessment:   Elijah Persaud reviewed with no symptoms noted  Advised to changing location of exercise at home to couch to allow feet to touch the floor  Reviewed technique to allow for smoother transition with improved sit to/from supine with rolling forward onto UE support  Sit to stand from lower surface without UE support  Encouraged continued sit to stand at home for exercise  Assessed HT in gait and VOR 1 with no symptoms noted  Plan: Continue PT per POC  No complaints at end of session  Reassess for any dizziness on return, confirm no further need for habituation and transition to independent HEP       Precautions: h/o ablation, h/o valve replacement, CHF, h/o cardiac stent, h/o GERD, sleep apnea, h/o cholecystomy, h/o skin CA  EPOC:  2021      TESTING           5x sit to stand 31 7sec            FGA             DANIELS 42/56            6 min walk test TBA            TUG TBA            Neuro Re-Ed             CRT left VM VM x2           Turner's  3x2 3x2          EO/EC             VOR 1   30sec x2          HT FT             Gait with HT   2laps ea          Tandem stance             Step taps             Unilateral hold             Ther Ex             Ankle pumps             Sit to stand   Low surface 5x2                                                                                        Ther Activity                                       Gait Training Modalities

## 2021-07-07 ENCOUNTER — TELEPHONE (OUTPATIENT)
Dept: FAMILY MEDICINE CLINIC | Facility: HOSPITAL | Age: 81
End: 2021-07-07

## 2021-07-07 ENCOUNTER — OFFICE VISIT (OUTPATIENT)
Dept: PODIATRY | Facility: CLINIC | Age: 81
End: 2021-07-07
Payer: MEDICARE

## 2021-07-07 VITALS
BODY MASS INDEX: 32.78 KG/M2 | HEART RATE: 67 BPM | SYSTOLIC BLOOD PRESSURE: 162 MMHG | WEIGHT: 229 LBS | HEIGHT: 70 IN | DIASTOLIC BLOOD PRESSURE: 79 MMHG

## 2021-07-07 DIAGNOSIS — M76.62 ACHILLES TENDINITIS OF BOTH LOWER EXTREMITIES: ICD-10-CM

## 2021-07-07 DIAGNOSIS — M67.00 SHORT ACHILLES TENDON, UNSPECIFIED LATERALITY: ICD-10-CM

## 2021-07-07 DIAGNOSIS — M76.61 ACHILLES TENDINITIS OF BOTH LOWER EXTREMITIES: ICD-10-CM

## 2021-07-07 DIAGNOSIS — M25.776: ICD-10-CM

## 2021-07-07 DIAGNOSIS — M72.2 PLANTAR FASCIITIS: Primary | ICD-10-CM

## 2021-07-07 PROCEDURE — 99203 OFFICE O/P NEW LOW 30 MIN: CPT | Performed by: PODIATRIST

## 2021-07-07 NOTE — LETTER
July 7, 2021     MD Kinga Cuenca  [address] [facility] [address]    Patient: Wan London   YOB: 1940   Date of Visit: 7/7/2021       Dear Dr Tu Price:    Thank you for referring Macario Talley to me for evaluation  Below are my notes for this consultation  If you have questions, please do not hesitate to call me  I look forward to following your patient along with you  Sincerely,        Joy Rider DPM        CC: No Recipients  SEBAS Baz[facility]  7/7/2021  6:24 PM  Sign when Signing Visit                 PATIENT:  Wan London  1940       ASSESSMENT:     1  Plantar fasciitis  Ambulatory referral to Physical Therapy   2  Osteophyte of foot, unspecified laterality     3  Achilles tendinitis of both lower extremities  Ambulatory referral to Physical Therapy   4  Short Achilles tendon, unspecified laterality  Ambulatory referral to Physical Therapy         PLAN:  1  Patient was counseled and educated on the condition and the diagnosis  2  PCP's note was reviewed  X-ray was personally reviewed  The radiological findings were discussed with the patient  3  The exam and symptoms are consistent with bursitis/ tendinitis at achilles tendon insertion and plantar fasciitis  The diagnosis, treatment options and prognosis were discussed with the patient  4   Referred him to PT/OT  Instructed for heel lift  Will hold off on partial immobilization due to balance issue  5   Instructed supportive care, home exercise, icing, and proper footwear/ arch support  Discussed possible further images depending on the progress  6  Patient will return in 4 weeks for re-evaluation  Subjective:     HPI  The patient presents with chief complaint of bilateral foot pain  He had it for about 2 years and pain has been worse  The symptoms presents around back and bottom of heels  Pain level is about 7-8 out of 10  The symptoms have been worse in the last few months  Pain increases with walking and standing  He also has post-static dyskinesia  The patient does not recall any injury  The patient tried Tramadol without resolving his pain  He was seen by his PCP a couple of weeks ago  He was sent for X-ray and referred to my office  He has chronic swelling in his legs  No associated numbness or paresthesia  The following portions of the patient's history were reviewed and updated as appropriate: allergies, current medications, past family history, past medical history, past social history, past surgical history and problem list   All pertinent labs and images were reviewed  Past Medical History  Past Medical History:   Diagnosis Date    Atrial fibrillation (HCC)     BPH (benign prostatic hyperplasia)     CHF (congestive heart failure) (HCC)     GERD (gastroesophageal reflux disease)     Skin cancer     Sleep apnea        Past Surgical History  Past Surgical History:   Procedure Laterality Date    CARDIAC SURGERY      CHOLECYSTECTOMY      CORONARY ARTERY BYPASS GRAFT      CORONARY STENT PLACEMENT      TONSILLECTOMY      VALVE REPLACEMENT          Allergies:  Patient has no known allergies      Medications:  Current Outpatient Medications   Medication Sig Dispense Refill    famotidine (PEPCID) 20 mg tablet TAKE 1 TABLET BY MOUTH TWICE A  tablet 0    furosemide (LASIX) 40 mg tablet Take 1 tablet (40 mg total) by mouth 2 (two) times a day 60 tablet 5    metoprolol tartrate (LOPRESSOR) 25 mg tablet Take 0 5 tablets (12 5 mg total) by mouth every 12 (twelve) hours  0    Potassium Chloride (KLOR-CON 10 PO) Take 1 tablet by mouth 2 (two) times a day      pyridoxine (B-6) 100 MG tablet Take 120 mg by mouth      traMADol (ULTRAM) 50 mg tablet Take by mouth      warfarin (COUMADIN) 5 mg tablet Take 0 5 tablets (2 5 mg total) by mouth daily Take 5mg Tuesday and Thursday and 2 5mg all other days; repeat INR Tuesday 8/28  0    atorvastatin (LIPITOR) 40 mg tablet Take 1 tablet (40 mg total) by mouth daily (Patient not taking: Reported on 7/7/2021) 90 tablet 3    folic acid (FOLVITE) 1 mg tablet Take 1 tablet (1 mg total) by mouth daily (Patient not taking: Reported on 7/7/2021) 30 tablet 5     No current facility-administered medications for this visit  Social History:  Social History     Socioeconomic History    Marital status: /Civil Union     Spouse name: None    Number of children: None    Years of education: None    Highest education level: None   Occupational History    None   Tobacco Use    Smoking status: Never Smoker    Smokeless tobacco: Never Used   Vaping Use    Vaping Use: Never used   Substance and Sexual Activity    Alcohol use: Yes    Drug use: No    Sexual activity: None   Other Topics Concern    None   Social History Narrative    None     Social Determinants of Health     Financial Resource Strain:     Difficulty of Paying Living Expenses:    Food Insecurity:     Worried About Running Out of Food in the Last Year:     [address] in the Last Year:    Transportation Needs:     Lack of Transportation (Medical):  Lack of Transportation (Non-Medical):    Physical Activity:     Days of Exercise per Week:     Minutes of Exercise per Session:    Stress:     Feeling of Stress :    Social Connections:     Frequency of Communication with Friends and Family:     Frequency of Social Gatherings with Friends and Family:     Attends Taoism Services:     Active Member of Clubs or Organizations:     Attends Club or Organization Meetings:     Marital Status:    Intimate Partner Violence:     Fear of Current or Ex-Partner:     Emotionally Abused:     Physically Abused:     Sexually Abused:           Review of Systems   Constitutional: Negative for appetite change, chills and fever  HENT: Negative for sore throat  Respiratory: Negative for cough and shortness of breath      Cardiovascular: Positive for leg swelling  Negative for chest pain  Gastrointestinal: Negative for diarrhea, nausea and vomiting  Musculoskeletal: Positive for arthralgias  Skin: Negative for rash and wound  Allergic/Immunologic: Negative for immunocompromised state  Neurological: Negative for weakness and numbness  Hematological: Bruises/bleeds easily  Psychiatric/Behavioral: Negative for behavioral problems and confusion  Objective:      /79   Pulse 67   Ht 5' 10" (1 778 m) Comment: verbal  Wt 104 kg (229 lb)   BMI 32 86 kg/m²          Physical Exam  Vitals reviewed  Constitutional:       General: He is not in acute distress  Appearance: He is obese  He is not toxic-appearing  HENT:      Head: Normocephalic and atraumatic  Cardiovascular:      Rate and Rhythm: Normal rate and regular rhythm  Pulses:           Dorsalis pedis pulses are 2+ on the right side and 2+ on the left side  Posterior tibial pulses are 0 on the right side and 0 on the left side  Comments: No ischemia  Venous stasis/ varicosity noted BLE  Pulmonary:      Effort: Pulmonary effort is normal  No respiratory distress  Musculoskeletal:         General: Tenderness present  No signs of injury  Cervical back: Normal range of motion and neck supple  Right lower leg: Edema present  Left lower leg: Edema present  Right ankle:      Right Achilles Tendon: Tenderness present  No defects  Duke's test negative  Left ankle:      Left Achilles Tendon: Tenderness present  No defects  Duke's test negative  Right foot: No foot drop  Left foot: No foot drop  Comments: Diffuse ankle swelling noted, left worse than right  Focal pain on posterior and plantar heel bilaterally  Skin:     General: Skin is warm  Capillary Refill: Capillary refill takes less than 2 seconds  Coloration: Skin is not cyanotic or mottled  Findings: No abscess, ecchymosis, petechiae or wound  Nails: There is no clubbing  Neurological:      General: No focal deficit present  Mental Status: He is alert and oriented to person, place, and time  Cranial Nerves: No cranial nerve deficit  Sensory: No sensory deficit  Motor: No weakness  Psychiatric:         Mood and Affect: Mood normal          Behavior: Behavior normal          Thought Content:  Thought content normal          Judgment: Judgment normal

## 2021-07-07 NOTE — PROGRESS NOTES
PATIENT:  Eric Hinkle  1940       ASSESSMENT:     1  Plantar fasciitis  Ambulatory referral to Physical Therapy   2  Osteophyte of foot, unspecified laterality     3  Achilles tendinitis of both lower extremities  Ambulatory referral to Physical Therapy   4  Short Achilles tendon, unspecified laterality  Ambulatory referral to Physical Therapy         PLAN:  1  Patient was counseled and educated on the condition and the diagnosis  2  PCP's note was reviewed  X-ray was personally reviewed  The radiological findings were discussed with the patient  3  The exam and symptoms are consistent with bursitis/ tendinitis at achilles tendon insertion and plantar fasciitis  The diagnosis, treatment options and prognosis were discussed with the patient  4   Referred him to PT/OT  Instructed for heel lift  Will hold off on partial immobilization due to balance issue  5   Instructed supportive care, home exercise, icing, and proper footwear/ arch support  Discussed possible further images depending on the progress  6  Patient will return in 4 weeks for re-evaluation  Subjective:     HPI  The patient presents with chief complaint of bilateral foot pain  He had it for about 2 years and pain has been worse  The symptoms presents around back and bottom of heels  Pain level is about 7-8 out of 10  The symptoms have been worse in the last few months  Pain increases with walking and standing  He also has post-static dyskinesia  The patient does not recall any injury  The patient tried Tramadol without resolving his pain  He was seen by his PCP a couple of weeks ago  He was sent for X-ray and referred to my office  He has chronic swelling in his legs  No associated numbness or paresthesia          The following portions of the patient's history were reviewed and updated as appropriate: allergies, current medications, past family history, past medical history, past social history, past surgical history and problem list   All pertinent labs and images were reviewed  Past Medical History  Past Medical History:   Diagnosis Date    Atrial fibrillation (HCC)     BPH (benign prostatic hyperplasia)     CHF (congestive heart failure) (HCC)     GERD (gastroesophageal reflux disease)     Skin cancer     Sleep apnea        Past Surgical History  Past Surgical History:   Procedure Laterality Date    CARDIAC SURGERY      CHOLECYSTECTOMY      CORONARY ARTERY BYPASS GRAFT      CORONARY STENT PLACEMENT      TONSILLECTOMY      VALVE REPLACEMENT          Allergies:  Patient has no known allergies  Medications:  Current Outpatient Medications   Medication Sig Dispense Refill    famotidine (PEPCID) 20 mg tablet TAKE 1 TABLET BY MOUTH TWICE A  tablet 0    furosemide (LASIX) 40 mg tablet Take 1 tablet (40 mg total) by mouth 2 (two) times a day 60 tablet 5    metoprolol tartrate (LOPRESSOR) 25 mg tablet Take 0 5 tablets (12 5 mg total) by mouth every 12 (twelve) hours  0    Potassium Chloride (KLOR-CON 10 PO) Take 1 tablet by mouth 2 (two) times a day      pyridoxine (B-6) 100 MG tablet Take 120 mg by mouth      traMADol (ULTRAM) 50 mg tablet Take by mouth      warfarin (COUMADIN) 5 mg tablet Take 0 5 tablets (2 5 mg total) by mouth daily Take 5mg Tuesday and Thursday and 2 5mg all other days; repeat INR Tuesday 8/28  0    atorvastatin (LIPITOR) 40 mg tablet Take 1 tablet (40 mg total) by mouth daily (Patient not taking: Reported on 7/7/2021) 90 tablet 3    folic acid (FOLVITE) 1 mg tablet Take 1 tablet (1 mg total) by mouth daily (Patient not taking: Reported on 7/7/2021) 30 tablet 5     No current facility-administered medications for this visit         Social History:  Social History     Socioeconomic History    Marital status: /Civil Union     Spouse name: None    Number of children: None    Years of education: None    Highest education level: None Occupational History    None   Tobacco Use    Smoking status: Never Smoker    Smokeless tobacco: Never Used   Vaping Use    Vaping Use: Never used   Substance and Sexual Activity    Alcohol use: Yes    Drug use: No    Sexual activity: None   Other Topics Concern    None   Social History Narrative    None     Social Determinants of Health     Financial Resource Strain:     Difficulty of Paying Living Expenses:    Food Insecurity:     Worried About Running Out of Food in the Last Year:     [address] in the Last Year:    Transportation Needs:     Lack of Transportation (Medical):  Lack of Transportation (Non-Medical):    Physical Activity:     Days of Exercise per Week:     Minutes of Exercise per Session:    Stress:     Feeling of Stress :    Social Connections:     Frequency of Communication with Friends and Family:     Frequency of Social Gatherings with Friends and Family:     Attends Hinduism Services:     Active Member of Clubs or Organizations:     Attends Club or Organization Meetings:     Marital Status:    Intimate Partner Violence:     Fear of Current or Ex-Partner:     Emotionally Abused:     Physically Abused:     Sexually Abused:           Review of Systems   Constitutional: Negative for appetite change, chills and fever  HENT: Negative for sore throat  Respiratory: Negative for cough and shortness of breath  Cardiovascular: Positive for leg swelling  Negative for chest pain  Gastrointestinal: Negative for diarrhea, nausea and vomiting  Musculoskeletal: Positive for arthralgias  Skin: Negative for rash and wound  Allergic/Immunologic: Negative for immunocompromised state  Neurological: Negative for weakness and numbness  Hematological: Bruises/bleeds easily  Psychiatric/Behavioral: Negative for behavioral problems and confusion           Objective:      /79   Pulse 67   Ht 5' 10" (1 778 m) Comment: verbal  Wt 104 kg (229 lb)   BMI 32 86 kg/m²          Physical Exam  Vitals reviewed  Constitutional:       General: He is not in acute distress  Appearance: He is obese  He is not toxic-appearing  HENT:      Head: Normocephalic and atraumatic  Cardiovascular:      Rate and Rhythm: Normal rate and regular rhythm  Pulses:           Dorsalis pedis pulses are 2+ on the right side and 2+ on the left side  Posterior tibial pulses are 0 on the right side and 0 on the left side  Comments: No ischemia  Venous stasis/ varicosity noted BLE  Pulmonary:      Effort: Pulmonary effort is normal  No respiratory distress  Musculoskeletal:         General: Tenderness present  No signs of injury  Cervical back: Normal range of motion and neck supple  Right lower leg: Edema present  Left lower leg: Edema present  Right ankle:      Right Achilles Tendon: Tenderness present  No defects  Duke's test negative  Left ankle:      Left Achilles Tendon: Tenderness present  No defects  Duke's test negative  Right foot: No foot drop  Left foot: No foot drop  Comments: Diffuse ankle swelling noted, left worse than right  Focal pain on posterior and plantar heel bilaterally  Skin:     General: Skin is warm  Capillary Refill: Capillary refill takes less than 2 seconds  Coloration: Skin is not cyanotic or mottled  Findings: No abscess, ecchymosis, petechiae or wound  Nails: There is no clubbing  Neurological:      General: No focal deficit present  Mental Status: He is alert and oriented to person, place, and time  Cranial Nerves: No cranial nerve deficit  Sensory: No sensory deficit  Motor: No weakness  Psychiatric:         Mood and Affect: Mood normal          Behavior: Behavior normal          Thought Content:  Thought content normal          Judgment: Judgment normal

## 2021-07-08 ENCOUNTER — OFFICE VISIT (OUTPATIENT)
Dept: PHYSICAL THERAPY | Facility: CLINIC | Age: 81
End: 2021-07-08
Payer: MEDICARE

## 2021-07-08 DIAGNOSIS — I48.0 PAROXYSMAL ATRIAL FIBRILLATION (HCC): Primary | ICD-10-CM

## 2021-07-08 DIAGNOSIS — R26.89 BALANCE DISORDER: Primary | ICD-10-CM

## 2021-07-08 DIAGNOSIS — R27.0 ATAXIA: ICD-10-CM

## 2021-07-08 PROCEDURE — 97112 NEUROMUSCULAR REEDUCATION: CPT

## 2021-07-08 NOTE — PROGRESS NOTES
PT DISCHARGE/ DAILY NOTE    Today's date: 2021  Patient name: Violetta Church  : 1940  MRN: [id number]  Referring provider: Reno Fulton MD  Dx:   Encounter Diagnosis     ICD-10-CM    1  Balance disorder  R26 89    2  Ataxia  R27 0        Start Time: 1135  Stop Time: 1220  Total time in clinic (min): 45 minutes    Assessment  Assessment details: Patient presents to PT with 5-6 month history of spinning sensation with getting out of bed  Patient denies any dizziness with rapid head motions, however, demonstrates increased caution with changes in position particularly with sit to stand  No near falls or falls reported  Positional testing (+) for BPPV with DHP on left with upbeating fatiguing nystagmus and dizziness noted  In addition, proximal LE weakness is noted with increased edema B/L LE, instability in gait with standardized balance testing scoring 42/56 on DANIELS and 17/30 on FGA  Endurance testing also significant for 5x sit to stand scoring 31 7sec  Patient will benefit from skilled intervention to facilitate resolution of BPPV, determination of any habituation needs and further strengthening and balance training to address his limitations  Patient is in agreement with treatment plans and is anticipated to attain all goals as noted below  Thank you for this referral     2021  Patient has returned to normal ADL tasks with no dizziness noted in daily activity  DHP (-) for BPPV and no dizziness noted with HT in gait  Proximal LE weakness continues to be noted compounded by heel spurs and lymphedema  Recommended stationary bike or swimming to increase aerobic activity without increasing LE irritation  Continue to reinforce sit to stand without use of lift chair to allow for maximal LE strength in functional tasks, patient able to demonstrate without UE assist off normal height chair    Improved scoring noted on DANIELS (from 42/56 on IE to 52/56) and improvement on FGA (from 17/30 on IE to 26/30)  Some vestibular delay noted with EC HT, patient prefers to continue to increase activity on his own to address, however, agreed to contact PT should instability increase or improvements in mobility be limited  Patient voiced understanding to all instructions and has been given HEP  Goals  STG  (4weeks)  Resolve BPPV on positional testing and determine need for habituation exercises - MET                            Improve FGA scoring by 6 points to demonstrate increased stability in gait, scoring 23/30- MET & exceeded                            Increase endurance scoring by 3 seconds in 5x sit to stand  to attain 28  4sec to improve mobility tolerance- MET and exceeded                            Patient will be assessed on TUG and 6 min walk tests with goals to follow- Held                           Patient will be independent in generalized strengthening exercises to promote increased proximal LE strength- MET  LTG  (8weeks)   Patient will be able to complete 6 min walk test with 190' improvement demonstrating minimal improvement for endurance scoring with distance TBD- Held                            Patient will demonstrates improved DANIELS scoring to 56/56 demonstrating increased overall stability for all functional tasks- partially met                            Patient will be independent in walking program for home- instructed                            Patient will demonstrate independent community ambulation on uneven surfaces without LOB noted- MET for short community distances                            Patient will report no dizziness with changes in position- MET    Plan  Treatment plan discussed with: patient and family        Subjective Evaluation    History of Present Illness  Mechanism of injury: Patient admits to feeling dizzy when getting out of bed in the morning  Patient admits to intermittent spinning noticed most with getting up out of bed for past 4-5 months    Patient admits to bone spurs in B/L achilles with pain noted with prolonged ambulation and stance  CT of brain scheduled for tomorrow  Patient denies aural fullness but admits to tinnitus  He has noticed increased caution with sit to stand and feeling unstable with gait on outside surfaces  No falls noted or near falls reported    No current dizziness noted  Patient states he has been walking more and is working on increasing his activity as the weather allows  Pain  Current pain ratin  At best pain ratin  At worst pain ratin  Location: heel pain B/L    Patient Goals  Patient goals for therapy: improved balance, independence with ADLs/IADLs and return to sport/leisure activities  Patient goal: to get rid of the spinning feeling and be able to return to previous ADLs        Objective     Concurrent Complaints  Positive for tinnitus, visual change and hearing loss  Negative for headaches, nausea/motion sickness, aural fullness and poor concentration    Strength/Myotome Testing     Left Shoulder     Planes of Motion   Flexion: 4+   Abduction: 4+     Right Shoulder     Planes of Motion   Flexion: 4+   Abduction: 4+     Left Elbow   Flexion: 4+  Extension: 4+    Right Elbow   Flexion: 4+  Extension: 4+    Left Wrist/Hand   Wrist extension: 4+  Wrist flexion: 4+    Right Wrist/Hand   Wrist extension: 4+  Wrist flexion: 4+    Left Hip   Planes of Motion   Flexion: 4-  Extension: 3+  Abduction: 4-    Right Hip   Planes of Motion   Flexion: 4-  Extension: 3+  Abduction: 4-    Left Knee   Flexion: 4  Extension: 4    Right Knee   Flexion: 4  Extension: 4    Left Ankle/Foot   Dorsiflexion: 3+  Plantar flexion: 3+    Right Ankle/Foot   Dorsiflexion: 3+  Plantar flexion: 3+  Neuro Exam:     Dizziness  Negative for disequilibrium, vertigo, oscillopsia, motion sickness, light-headedness, rocking or swaying, diplopia and floating or swimming       Exacerbating factors  Negative for bending over, rolling in bed, looking up, walking, turning head, supine to/from sitting, optokinetic movement and walking in busy environment  Symptoms   Intensity at best: 0/10  Intensity at worst: 0/10  Average intensity: 0/10    Headaches   Patient reports headaches: No      Cervical exam   Ligament Laxity Testing   Alar ligament: WNL  Sharp Jodi: WNL  Modified VBI   Left: asymptomatic  Right: asymptomatic  Seated posture: forward head posture and internally rotated shoulders    Oculomotor exam   Oculomotor ROM: WNL  Resting nystagmus: not present   Gaze holding nystagmus: not present left  and not present right  Smooth pursuits: saccadic smooth pursuit  Convergence: normal    Positional testing   Pancho-Hallpike   Left posterior canal: WNL  Right posterior canal: WNL  Positional testing comment: Unilateral stance 2sec left and right, Romberg EO (-), EC (+) 10seconds  Unable to attain sharpened romberg without external support and unable to hold position  7/8  Patient is now able to hold unilateral stance 5-6sec right, 4-5 sec left  Romberg EC (+) 15sec  Tandem stance remains challenging with ability to hold position 2-3sec  Sensation   Light touch LE: left WNL and right WNL    Coordination   Heel to shin: left WNL and right WNL  Finger to nose: left WNL and right WNL  Rapid alternating movements: UE impaired and LE impaired     Functional outcomes   Functional outcome gait comment: Increased base of support with slow robert, decreased LE clearance in swing  CERVICAL SOFT TISSUE:  ROM restricted for B/L cervical rotation right 1/2 range, left 3/4 range  Increased soft tissue tightness noted B/L Scalenes, suboccipital guarding and UT tightness  SKIN:  Significant edema noted B/L LE  Discussed compression stockings    Flowsheet Rows      Most Recent Value   PT/OT G-Codes   Current Score  69   Projected Score  54   Assessment Type  Discharge   G code set  Mobility: Walking & Moving Around             Precautions: h/o ablation, h/o valve replacement, CHF, h/o cardiac stent, h/o GERD, sleep apnea, h/o cholecystomy, h/o skin CA  EPOC:  8/23/2021      TESTING 6/28 7/8           5x sit to stand 31 7sec 19 5sec           FGA 17/30 26/30           DANIELS 42/56 52/56           6 min walk test TBA            TUG TBA            Neuro Re-Ed             CRT left VM            Turner's             EO/EC  10sec partial tandem 5x2           HT FT  EC HT 5           Tandem stance             Step taps             Gait with HT  4 laps           Unilateral hold             Ther Ex             Ankle pumps             Sit to stand  5x2                                                                                         Ther Activity                                       Gait Training                                       Modalities

## 2021-07-14 DIAGNOSIS — I25.10 CORONARY ARTERY DISEASE INVOLVING NATIVE CORONARY ARTERY OF NATIVE HEART WITHOUT ANGINA PECTORIS: Primary | ICD-10-CM

## 2021-07-14 RX ORDER — POTASSIUM CHLORIDE 750 MG/1
10 TABLET, FILM COATED, EXTENDED RELEASE ORAL 2 TIMES DAILY
Qty: 60 TABLET | Refills: 3 | Status: SHIPPED | OUTPATIENT
Start: 2021-07-14 | End: 2021-07-27 | Stop reason: SDUPTHER

## 2021-07-16 ENCOUNTER — OFFICE VISIT (OUTPATIENT)
Dept: FAMILY MEDICINE CLINIC | Facility: HOSPITAL | Age: 81
End: 2021-07-16
Payer: MEDICARE

## 2021-07-16 VITALS
WEIGHT: 237 LBS | OXYGEN SATURATION: 97 % | BODY MASS INDEX: 34.01 KG/M2 | HEART RATE: 68 BPM | DIASTOLIC BLOOD PRESSURE: 78 MMHG | SYSTOLIC BLOOD PRESSURE: 124 MMHG

## 2021-07-16 DIAGNOSIS — M79.672 PAIN OF BOTH HEELS: Primary | ICD-10-CM

## 2021-07-16 DIAGNOSIS — H35.9 RETINAL CHANGE: ICD-10-CM

## 2021-07-16 DIAGNOSIS — I10 ESSENTIAL HYPERTENSION: ICD-10-CM

## 2021-07-16 DIAGNOSIS — M79.671 PAIN OF BOTH HEELS: Primary | ICD-10-CM

## 2021-07-16 PROCEDURE — 99213 OFFICE O/P EST LOW 20 MIN: CPT | Performed by: INTERNAL MEDICINE

## 2021-07-16 NOTE — PROGRESS NOTES
Assessment/Plan:       Diagnoses and all orders for this visit:    Pain of both heels    Essential hypertension    Retinal change          All of the above diagnoses have been assessed  Additional COMMENTS/PLAN:  Given referral to retinal specialist based on exam done [facility]  Subjective:      Patient ID: Marycarmen Wu is a [de-identified] y o  male  HPI     Pt here for heel pain  This is better  The following portions of the patient's history were revised and updated as appropriate: Problem list, allergies, med list, FH, SH, Past medical and surgical histories  Current Outpatient Medications   Medication Sig Dispense Refill    famotidine (PEPCID) 20 mg tablet TAKE 1 TABLET BY MOUTH TWICE A  tablet 0    folic acid (FOLVITE) 1 mg tablet Take 1 tablet (1 mg total) by mouth daily 30 tablet 5    furosemide (LASIX) 40 mg tablet Take 1 tablet (40 mg total) by mouth 2 (two) times a day 60 tablet 5    metoprolol tartrate (LOPRESSOR) 25 mg tablet Take 0 5 tablets (12 5 mg total) by mouth every 12 (twelve) hours 30 tablet 11    potassium chloride (Klor-Con 10) 10 mEq tablet Take 1 tablet (10 mEq total) by mouth 2 (two) times a day 60 tablet 3    pyridoxine (B-6) 100 MG tablet Take 120 mg by mouth      traMADol (ULTRAM) 50 mg tablet Take by mouth      warfarin (COUMADIN) 5 mg tablet Take 0 5 tablets (2 5 mg total) by mouth daily Take 5mg Tuesday and Thursday and 2 5mg all other days; repeat INR Tuesday 8/28  0    atorvastatin (LIPITOR) 40 mg tablet Take 1 tablet (40 mg total) by mouth daily (Patient not taking: Reported on 7/7/2021) 90 tablet 3     No current facility-administered medications for this visit         Review of Systems      Objective:    /78   Pulse 68   Wt 108 kg (237 lb)   SpO2 97%   BMI 34 01 kg/m²     BP Readings from Last 3 Encounters:   07/16/21 124/78   07/07/21 162/79   06/21/21 120/70                  Wt Readings from Last 3 Encounters:   07/16/21 108 kg (237 lb) 07/07/21 104 kg (229 lb)   06/21/21 105 kg (231 lb)         Physical Exam      No visits with results within 2 Week(s) from this visit  Latest known visit with results is:   Lab on 06/24/2021   Component Date Value Ref Range Status    WBC 06/24/2021 5 74  4 31 - 10 16 Thousand/uL Final    RBC 06/24/2021 3 35* 3 88 - 5 62 Million/uL Final    Hemoglobin 06/24/2021 13 6  12 0 - 17 0 g/dL Final    Hematocrit 06/24/2021 40 7  36 5 - 49 3 % Final    MCV 06/24/2021 122* 82 - 98 fL Final    MCH 06/24/2021 40 6* 26 8 - 34 3 pg Final    MCHC 06/24/2021 33 4  31 4 - 37 4 g/dL Final    RDW 06/24/2021 12 2  11 6 - 15 1 % Final    Platelets 99/50/8707 175  149 - 390 Thousands/uL Final    MPV 06/24/2021 10 7  8 9 - 12 7 fL Final    Sodium 06/24/2021 140  136 - 145 mmol/L Final    Potassium 06/24/2021 3 8  3 5 - 5 3 mmol/L Final    Chloride 06/24/2021 103  100 - 108 mmol/L Final    CO2 06/24/2021 31  21 - 32 mmol/L Final    ANION GAP 06/24/2021 6  4 - 13 mmol/L Final    BUN 06/24/2021 14  5 - 25 mg/dL Final    Creatinine 06/24/2021 1 13  0 60 - 1 30 mg/dL Final    Standardized to IDMS reference method    Glucose, Fasting 06/24/2021 107* 65 - 99 mg/dL Final    Specimen collection should occur prior to Sulfasalazine administration due to the potential for falsely depressed results  Specimen collection should occur prior to Sulfapyridine administration due to the potential for falsely elevated results   Calcium 06/24/2021 8 9  8 3 - 10 1 mg/dL Final    Corrected Calcium 06/24/2021 9 5  8 3 - 10 1 mg/dL Final    AST 06/24/2021 48* 5 - 45 U/L Final    Specimen collection should occur prior to Sulfasalazine administration due to the potential for falsely depressed results   ALT 06/24/2021 43  12 - 78 U/L Final    Specimen collection should occur prior to Sulfasalazine and/or Sulfapyridine administration due to the potential for falsely depressed results       Alkaline Phosphatase 06/24/2021 135* 46 - 116 U/L Final    Total Protein 06/24/2021 6 9  6 4 - 8 2 g/dL Final    Albumin 06/24/2021 3 3* 3 5 - 5 0 g/dL Final    Total Bilirubin 06/24/2021 0 78  0 20 - 1 00 mg/dL Final    Use of this assay is not recommended for patients undergoing treatment with eltrombopag due to the potential for falsely elevated results   eGFR 06/24/2021 61  ml/min/1 73sq m Final    TSH 3RD GENERATON 06/24/2021 3 510  0 358 - 3 740 uIU/mL Final    Cholesterol 06/24/2021 167  50 - 200 mg/dL Final    Cholesterol:       Desirable         <200 mg/dl       Borderline         200-239 mg/dl       High              >239           Triglycerides 06/24/2021 66  <=150 mg/dL Final    Triglyceride:     Normal          <150 mg/dl     Borderline High 150-199 mg/dl     High            200-499 mg/dl        Very High       >499 mg/dl    Specimen collection should occur prior to N-Acetylcysteine or Metamizole administration due to the potential for falsely depressed results   HDL, Direct 06/24/2021 97  >=40 mg/dL Final    HDL Cholesterol:       Low     <41 mg/dL    LDL Calculated 06/24/2021 57  0 - 100 mg/dL Final    This screening LDL is a calculated result  It does not have the accuracy of the Direct Measured LDL in the monitoring of patients with hyperlipidemia and/or statin therapy  Direct Measure LDL ([id number]) must be ordered separately in these patients  LDL Cholesterol:     Optimal           <100 mg/dl     Near Optimal      100-129 mg/dl     Above Optimal       Borderline High 130-159 mg/dl       High            160-189 mg/dl       Very High       >189 mg/dl           Folate 06/24/2021 2 7* 3 1 - 17 5 ng/mL Final    Vitamin B-12 06/24/2021 297  100 - 900 pg/mL Final         Andre Russ MD    Some or all of this note was generated with a voice recognition dictation system and therefore my contain grammatical or spelling errors

## 2021-07-21 ENCOUNTER — OFFICE VISIT (OUTPATIENT)
Dept: CARDIOLOGY CLINIC | Facility: CLINIC | Age: 81
End: 2021-07-21
Payer: MEDICARE

## 2021-07-21 VITALS
WEIGHT: 230 LBS | HEIGHT: 70 IN | HEART RATE: 81 BPM | SYSTOLIC BLOOD PRESSURE: 138 MMHG | BODY MASS INDEX: 32.93 KG/M2 | DIASTOLIC BLOOD PRESSURE: 60 MMHG

## 2021-07-21 DIAGNOSIS — Z95.3 S/P AORTIC VALVE REPLACEMENT WITH BIOPROSTHETIC VALVE: ICD-10-CM

## 2021-07-21 DIAGNOSIS — I10 ESSENTIAL HYPERTENSION: ICD-10-CM

## 2021-07-21 DIAGNOSIS — I48.0 PAROXYSMAL ATRIAL FIBRILLATION (HCC): ICD-10-CM

## 2021-07-21 DIAGNOSIS — I25.10 CORONARY ARTERY DISEASE INVOLVING NATIVE CORONARY ARTERY OF NATIVE HEART WITHOUT ANGINA PECTORIS: Primary | ICD-10-CM

## 2021-07-21 DIAGNOSIS — N18.31 STAGE 3A CHRONIC KIDNEY DISEASE (HCC): ICD-10-CM

## 2021-07-21 DIAGNOSIS — K21.9 GASTROESOPHAGEAL REFLUX DISEASE: ICD-10-CM

## 2021-07-21 PROCEDURE — 93000 ELECTROCARDIOGRAM COMPLETE: CPT | Performed by: INTERNAL MEDICINE

## 2021-07-21 PROCEDURE — 99214 OFFICE O/P EST MOD 30 MIN: CPT | Performed by: INTERNAL MEDICINE

## 2021-07-21 RX ORDER — ATORVASTATIN CALCIUM 40 MG/1
40 TABLET, FILM COATED ORAL DAILY
Qty: 90 TABLET | Refills: 3 | Status: SHIPPED | OUTPATIENT
Start: 2021-07-21 | End: 2021-07-21 | Stop reason: SDUPTHER

## 2021-07-21 RX ORDER — FAMOTIDINE 20 MG/1
20 TABLET, FILM COATED ORAL 2 TIMES DAILY
Qty: 180 TABLET | Refills: 3 | Status: SHIPPED | OUTPATIENT
Start: 2021-07-21

## 2021-07-21 RX ORDER — ATORVASTATIN CALCIUM 40 MG/1
40 TABLET, FILM COATED ORAL DAILY
Qty: 90 TABLET | Refills: 3 | Status: SHIPPED | OUTPATIENT
Start: 2021-07-21 | End: 2021-07-27 | Stop reason: SDUPTHER

## 2021-07-21 NOTE — PATIENT INSTRUCTIONS
Recommendations:  1  Switch from warfarin to Eliquis 5mg BID - patient may use Eliquis as he has a biologic AVR  2  Continue remainder of medications  3  ANDREY/cardioversion  4  Follow up in 2 months

## 2021-07-21 NOTE — PROGRESS NOTES
Cardiology   Rakan Azul [de-identified] y o  male MRN: [id number]        Impression:  1  PAF s/p ablation 2018 - currently in atrial fibrillation  Patient is not an obligate warfarin utilizer (bioAVR), will switch to Eliquis 5mg 2x/day  2  CAD s/p remote PCI - stable  3  S/P bioAVR  4  HTN - controlled  Recommendations:  1  Switch from warfarin to Eliquis 5mg BID - patient may use Eliquis as he has a biologic AVR  2  Continue remainder of medications  3  ANDREY/cardioversion  4  Follow up in 2 months  HPI: Rakan Azul is a [de-identified]y o  year old male with paroxysmal atrial fibrillation s/p ablation 2018, s/p bio AVR, CAD, hypertension, and CKD, who presents for follow up after 2 years  Asymptomatic from a cardiac standpoint  No chest pain, shortness of breath, or palpitations  Does feel some fatigue  Recently treated for vertigo, which has improved  Review of Systems   Constitutional: Negative  HENT: Negative  Eyes: Negative  Respiratory: Negative for chest tightness and shortness of breath  Cardiovascular: Positive for leg swelling  Negative for chest pain and palpitations  Gastrointestinal: Negative  Endocrine: Negative  Genitourinary: Negative  Musculoskeletal: Negative  Skin: Negative  Allergic/Immunologic: Negative  Neurological: Negative  Hematological: Negative  Psychiatric/Behavioral: Negative  All other systems reviewed and are negative          Past Medical History:   Diagnosis Date    Atrial fibrillation (HCC)     BPH (benign prostatic hyperplasia)     CHF (congestive heart failure) (HCC)     GERD (gastroesophageal reflux disease)     Skin cancer     Sleep apnea      Past Surgical History:   Procedure Laterality Date    CARDIAC SURGERY      CHOLECYSTECTOMY      CORONARY ARTERY BYPASS GRAFT      CORONARY STENT PLACEMENT      TONSILLECTOMY      VALVE REPLACEMENT       Social History     Substance and Sexual Activity   Alcohol Use Yes Social History     Substance and Sexual Activity   Drug Use No     Social History     Tobacco Use   Smoking Status Never Smoker   Smokeless Tobacco Never Used     Family History   Problem Relation Age of Onset    Cancer Mother     Dementia Father        Allergies:  No Known Allergies    Medications:     Current Outpatient Medications:     atorvastatin (LIPITOR) 40 mg tablet, Take 1 tablet (40 mg total) by mouth daily, Disp: 90 tablet, Rfl: 3    famotidine (PEPCID) 20 mg tablet, TAKE 1 TABLET BY MOUTH TWICE A DAY, Disp: 180 tablet, Rfl: 0    folic acid (FOLVITE) 1 mg tablet, Take 1 tablet (1 mg total) by mouth daily, Disp: 30 tablet, Rfl: 5    furosemide (LASIX) 40 mg tablet, Take 1 tablet (40 mg total) by mouth 2 (two) times a day, Disp: 60 tablet, Rfl: 5    metoprolol tartrate (LOPRESSOR) 25 mg tablet, Take 0 5 tablets (12 5 mg total) by mouth every 12 (twelve) hours, Disp: 30 tablet, Rfl: 11    potassium chloride (Klor-Con 10) 10 mEq tablet, Take 1 tablet (10 mEq total) by mouth 2 (two) times a day (Patient taking differently: Take 5 mEq by mouth 2 (two) times a day 1/2 a tablet), Disp: 60 tablet, Rfl: 3    pyridoxine (B-6) 100 MG tablet, Take 120 mg by mouth, Disp: , Rfl:     traMADol (ULTRAM) 50 mg tablet, Take by mouth as needed , Disp: , Rfl:     apixaban (ELIQUIS) 5 mg, Take 1 tablet (5 mg total) by mouth 2 (two) times a day, Disp: 60 tablet, Rfl: 5      Wt Readings from Last 3 Encounters:   07/21/21 104 kg (230 lb)   07/16/21 108 kg (237 lb)   07/07/21 104 kg (229 lb)     Temp Readings from Last 3 Encounters:   08/24/18 97 7 °F (36 5 °C) (Oral)   06/22/18 98 1 °F (36 7 °C) (Oral)     BP Readings from Last 3 Encounters:   07/21/21 138/60   07/16/21 124/78   07/07/21 162/79     Pulse Readings from Last 3 Encounters:   07/21/21 81   07/16/21 68   07/07/21 67         Physical Exam  HENT:      Head: Atraumatic        Mouth/Throat:      Mouth: Mucous membranes are moist    Eyes:      Extraocular Movements: Extraocular movements intact  Cardiovascular:      Rate and Rhythm: Normal rate  Rhythm irregular  Heart sounds: Murmur heard  Systolic murmur is present with a grade of 2/6  Pulmonary:      Effort: Pulmonary effort is normal       Breath sounds: Normal breath sounds  Abdominal:      General: Abdomen is flat  Musculoskeletal:         General: Normal range of motion  Cervical back: Normal range of motion  Right lower le+ Pitting Edema present  Left lower le+ Pitting Edema present  Skin:     General: Skin is warm  Neurological:      General: No focal deficit present  Mental Status: He is alert and oriented to person, place, and time  Psychiatric:         Mood and Affect: Mood normal          Behavior: Behavior normal            Laboratory Studies:  CMP:  Lab Results   Component Value Date    K 3 8 2021     2021    CO2 31 2021    BUN 14 2021    CREATININE 1 13 2021    AST 48 (H) 2021    ALT 43 2021    EGFR 61 2021       Lipid Profile:   No results found for: CHOL  Lab Results   Component Value Date    HDL 97 2021     Lab Results   Component Value Date    LDLCALC 57 2021     Lab Results   Component Value Date    TRIG 66 2021       Cardiac testing:   EKG reviewed personally: Afib 81 [id number]  Results for orders placed during the hospital encounter of 18    Echo complete with contrast if indicated    Narrative  [address] [facility], [address] [facility]  [phone]    Transthoracic Echocardiogram  2D, M-mode, Doppler, and Color Doppler    Study date:  2018    Patient: Bright Menendez  MR number: [id number]  Account number: [de-identified]  : 1940  Age: 69 years  Gender: Male  Status: Inpatient  Location: Bedside  Height: 70 in  Weight: 220 4 lb  BP: 122/ 81 mmHg    Indications: Atrial fibrillation      Diagnoses: I48 0 - Atrial fibrillation    Sonographer:  ARELIS Ortiz RDCS RVT  Primary Physician:  Eden Joseph MD  Referring Physician:  El Flores MD  Group:  [facility]  Interpreting Physician:  Ena Crum MD    SUMMARY    PROCEDURE INFORMATION:  This was a technically difficult study  LEFT VENTRICLE:  Systolic function was at the lower limits of normal  Ejection fraction was estimated to be 50 %  VENTRICULAR SEPTUM:  There was paradoxical motion  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  LEFT ATRIUM:  The atrium was mildly dilated  RIGHT ATRIUM:  The atrium was mildly dilated  MITRAL VALVE:  There was mild regurgitation  AORTIC VALVE:  A bioprosthesis was present  It exhibited normal function  Transaortic velocity was increased due to increased transvalvular flow, possible mismatch  3 1-3 3 m/sec  There was mild regurgitation  The valve was not well visualized  TRICUSPID VALVE:  There was trace regurgitation  PULMONIC VALVE:  There was trace regurgitation  HISTORY: PRIOR HISTORY: Risk factors: hypertension  PRIOR PROCEDURES: Bioprosthesis of aortic valve  Coronary bypass  PROCEDURE: The procedure was performed at the bedside  This was a routine study  The transthoracic approach was used  The study included complete 2D imaging, M-mode, complete spectral Doppler, and color Doppler  Images were obtained from  the parasternal, apical, subcostal, and suprasternal notch acoustic windows  Echocardiographic views were limited due to decreased penetration and lung interference  This was a technically difficult study  LEFT VENTRICLE: Size was normal  Systolic function was at the lower limits of normal  Ejection fraction was estimated to be 50 %  Wall thickness was normal  No evidence of apical thrombus  DOPPLER: Left ventricular diastolic function  parameters were normal     VENTRICULAR SEPTUM: There was paradoxical motion   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  RIGHT VENTRICLE: The size was normal  Systolic function was normal  Wall thickness was normal     LEFT ATRIUM: The atrium was mildly dilated  RIGHT ATRIUM: The atrium was mildly dilated  MITRAL VALVE: Valve structure was normal  There was mild thickening  There was normal leaflet separation  DOPPLER: The transmitral velocity was within the normal range  There was no evidence for stenosis  There was mild regurgitation  AORTIC VALVE: The valve was trileaflet  Leaflets exhibited normal thickness and normal cuspal separation  A bioprosthesis was present  It exhibited normal function  The valve was not well visualized  DOPPLER: Transaortic velocity was  increased due to increased transvalvular flow, possible mismatch  3 1-3 3 m/sec  There was no evidence for stenosis  There was mild regurgitation  TRICUSPID VALVE: The valve structure was normal  There was normal leaflet separation  DOPPLER: The transtricuspid velocity was within the normal range  There was no evidence for stenosis  There was trace regurgitation  PULMONIC VALVE: Leaflets exhibited normal thickness, no calcification, and normal cuspal separation  DOPPLER: The transpulmonic velocity was within the normal range  There was trace regurgitation  PERICARDIUM: There was no pericardial effusion  The pericardium was normal in appearance  AORTA: The root exhibited normal size  SYSTEMIC VEINS: IVC: The inferior vena cava was normal in size  SYSTEM MEASUREMENT TABLES    2D  %FS: 37 55 %  AV Diam: 3 52 cm  EDV(Teich): 154 92 ml  EF(Teich): 66 95 %  ESV(Teich): 51 21 ml  IVSd: 0 87 cm  LA Area: 27 03 cm2  LA Diam: 4 41 cm  LVEDV MOD A4C: 134 83 ml  LVEF MOD A4C: 69 22 %  LVESV MOD A4C: 41 5 ml  LVIDd: 5 62 cm  LVIDs: 3 51 cm  LVLd A4C: 8 08 cm  LVLs A4C: 6 71 cm  LVOT Diam: 2 13 cm  LVPWd: 0 93 cm  RA Area: 23 41 cm2  RVIDd: 4 65 cm  SV MOD A4C: 93 33 ml  SV(Teich): 103 71 ml    CW  AV Env  Ti: 335 26 ms  AV SI: 320 23 ml/m2  AV SV: 698 11 ml  AV VTI: 71 75 cm  AV Vmax: 3 17 m/s  AV Vmean: 2 14 m/s  AV maxP 32 mmHg  AV meanP 79 mmHg    MM  TAPSE: 1 96 cm    PW  KIKE (VTI): 1 61 cm2  KIKE Vmax: 1 43 cm2  LVOT Env  Ti: 342 95 ms  LVOT VTI: 32 44 cm  LVOT Vmax: 1 28 m/s  LVOT Vmean: 0 95 m/s  LVOT maxP 57 mmHg  LVOT meanPG: 3 96 mmHg  LVSI Dopp: 52 83 ml/m2  LVSV Dopp: 115 17 ml  MV A German: 0 54 m/s  MV Dec Baylor: 4 8 m/s2  MV DecT: 262 82 ms  MV E German: 1 26 m/s  MV E/A Ratio: 2 34  MV PHT: 76 22 ms  MVA By PHT: 2 89 cm2    [facility] Accredited Echocardiography Laboratory    Prepared and electronically signed by    Leslie Jaimes MD  Signed 2018 15:17:47    Results for orders placed during the hospital encounter of 18    ANDREY    Narrative  [facility] [address]  [facility], [address] [facility]  [phone]    Transesophageal Echocardiogram  2D, Doppler, and Color Doppler    Study date:  24-Aug-2018    Patient: Bernarda Khalil  MR number: [id number]  Account number: [de-identified]  : [id number]  Age: 69 years  Gender: Male  Status: Outpatient  Location: Cath lab  Height: 70 in  Weight: 229 lb  BP: 118/ 76 mmHg    Indications: AFIB    Diagnoses: I48 0 - Atrial fibrillation    Sonographer:  MILTON Zhu  Interpreting Physician:  James Toro DO  Primary Physician:  Edi Deal MD  Referring Physician:  James Toro DO  Group:  [facility]    SUMMARY    LEFT VENTRICLE:  Systolic function was at the lower limits of normal by visual assessment  Ejection fraction was estimated to be 50 %  LEFT ATRIUM:  The atrium was moderately dilated  AORTIC VALVE:  A bioprosthesis was present  It exhibited stenosis and regurgitation and reduced motion  There was mild thickening of the leaflets  There was mild regurgitation      HISTORY: PRIOR HISTORY: Stent, AFIB, Hypertension, S/P AVR    PROCEDURE: The procedure was performed in the catheterization laboratory  This was a routine study  The risks and alternatives of the procedure were explained to the patient and informed consent was obtained  The transesophageal approach  was used  The study included complete 2D imaging, limited spectral Doppler, and color Doppler  The heart rate was 65 bpm, at the start of the study  An adult omniplane probe was inserted by the attending cardiologist  Intubated with ease  One intubation attempt(s)  There was no blood detected on the probe  Image quality was adequate  There were no complications during the procedure  MEDICATIONS: Anesthesia administered by anesthesia team     LEFT VENTRICLE: Systolic function was at the lower limits of normal by visual assessment  Ejection fraction was estimated to be 50 %  LEFT ATRIUM: The atrium was moderately dilated  APPENDAGE: No thrombus was identified  AORTIC VALVE: A bioprosthesis was present  It exhibited stenosis and regurgitation and reduced motion  There was mild thickening of the leaflets  DOPPLER: There was mild regurgitation  PERICARDIUM: There was no pericardial effusion  MEASUREMENT TABLES    DOPPLER MEASUREMENTS  LVOT   (Reference normals)  Peak german   300 cm/s   (--)  Peak gradient   44 mmHg   (--)  Mean gradient   21 mmHg   (--)    SYSTEM MEASUREMENT TABLES    Unspecified Scan Mode  Mean Grad; Antegrade Flow: 21 mm[Hg]  Mean Grad; Mean value; Antegrade Flow: 21 mm[Hg]  Mean German; Antegrade Flow: 2080 mm/s  Mean German; Mean value; Antegrade Flow: 2080 mm/s  Peak Grad; Mean; Antegrade Flow: 44 mm[Hg]  VTI; Antegrade Flow: 83 3 cm  VTI; Mean; Antegrade Flow: 83 3 cm  Vmax; Antegrade Flow: 3310 mm/s  Vmax; Mean; Antegrade Flow: 3310 mm/s    [employer] Accredited Echocardiography Laboratory    Prepared and electronically signed by    Sam Lopez DO  Signed 10-Sep-2018 14:39:42    No results found for this or any previous visit  No results found for this or any previous visit

## 2021-07-26 ENCOUNTER — TELEPHONE (OUTPATIENT)
Dept: NON INVASIVE DIAGNOSTICS | Facility: HOSPITAL | Age: 81
End: 2021-07-26

## 2021-07-27 ENCOUNTER — ANESTHESIA (OUTPATIENT)
Dept: NON INVASIVE DIAGNOSTICS | Facility: HOSPITAL | Age: 81
End: 2021-07-27

## 2021-07-27 ENCOUNTER — HOSPITAL ENCOUNTER (OUTPATIENT)
Dept: NON INVASIVE DIAGNOSTICS | Facility: HOSPITAL | Age: 81
Discharge: HOME | End: 2021-07-27
Attending: INTERNAL MEDICINE
Payer: MEDICARE

## 2021-07-27 ENCOUNTER — ANESTHESIA EVENT (OUTPATIENT)
Dept: NON INVASIVE DIAGNOSTICS | Facility: HOSPITAL | Age: 81
End: 2021-07-27

## 2021-07-27 VITALS
RESPIRATION RATE: 18 BRPM | HEART RATE: 83 BPM | OXYGEN SATURATION: 99 % | BODY MASS INDEX: 32.64 KG/M2 | WEIGHT: 228 LBS | TEMPERATURE: 98.1 F | DIASTOLIC BLOOD PRESSURE: 74 MMHG | HEIGHT: 70 IN | SYSTOLIC BLOOD PRESSURE: 181 MMHG

## 2021-07-27 DIAGNOSIS — I25.10 CORONARY ARTERY DISEASE INVOLVING NATIVE CORONARY ARTERY OF NATIVE HEART WITHOUT ANGINA PECTORIS: ICD-10-CM

## 2021-07-27 DIAGNOSIS — I48.0 PAROXYSMAL ATRIAL FIBRILLATION (HCC): ICD-10-CM

## 2021-07-27 LAB
ATRIAL RATE: 82 BPM
ATRIAL RATE: 88 BPM
P AXIS: 63 DEGREES
P AXIS: 92 DEGREES
PR INTERVAL: 160 MS
PR INTERVAL: 166 MS
QRS AXIS: 73 DEGREES
QRS AXIS: 90 DEGREES
QRSD INTERVAL: 104 MS
QRSD INTERVAL: 106 MS
QT INTERVAL: 396 MS
QT INTERVAL: 410 MS
QTC INTERVAL: 479 MS
QTC INTERVAL: 479 MS
T WAVE AXIS: 59 DEGREES
T WAVE AXIS: 65 DEGREES
VENTRICULAR RATE: 82 BPM
VENTRICULAR RATE: 88 BPM

## 2021-07-27 PROCEDURE — 93005 ELECTROCARDIOGRAM TRACING: CPT

## 2021-07-27 PROCEDURE — 93010 ELECTROCARDIOGRAM REPORT: CPT | Performed by: INTERNAL MEDICINE

## 2021-07-27 RX ORDER — ATORVASTATIN CALCIUM 40 MG/1
40 TABLET, FILM COATED ORAL DAILY
Qty: 90 TABLET | Refills: 3 | Status: SHIPPED | OUTPATIENT
Start: 2021-07-27

## 2021-07-27 RX ORDER — POTASSIUM CHLORIDE 750 MG/1
10 TABLET, FILM COATED, EXTENDED RELEASE ORAL 2 TIMES DAILY
Qty: 180 TABLET | Refills: 3 | Status: SHIPPED | OUTPATIENT
Start: 2021-07-27 | End: 2022-05-19

## 2021-07-27 NOTE — PROGRESS NOTES
Labs reviewed and unremarkable CONSTITUTIONAL: Well-appearing; well-nourished; in no apparent distress.   EYES: PERRL; EOM intact.   ENT: normal nose; no rhinorrhea; normal pharynx with no tonsillar hypertrophy.   NECK; no cervical lymphadenopathy.   CARDIOVASCULAR: Normal S1, S2; no murmurs, rubs, or gallops.   RESPIRATORY: diffuse b/l expiratory wheezing and rhonchi with fair air movement.   GI/: Normal bowel sounds; non-distended; non-tender; no palpable organomegaly.   MS: No evidence of trauma or deformity. Non-tender to palpation. No scoliosis. No CVA tenderness. Normal ROM in all four extremities; non-tender to palpation; distal pulses are normal.   SKIN: Normal for age and race; warm; dry; good turgor; no apparent lesions or exudate.   NEURO/PSYCH: A & O x 4; grossly unremarkable.

## 2021-07-27 NOTE — ANESTHESIA PREPROCEDURE EVALUATION
Procedure:  ANDREY    Relevant Problems   CARDIO   (+) Coronary artery disease involving native coronary artery of native heart without angina pectoris   (+) Essential hypertension   (+) Paroxysmal atrial fibrillation (HCC)   (+) S/P aortic valve replacement with bioprosthetic valve      GI/HEPATIC   (+) Gastroesophageal reflux disease without esophagitis      /RENAL   (+) Benign prostatic hyperplasia without lower urinary tract symptoms   (+) Stage III chronic kidney disease (HCC)        Physical Exam    Airway    Mallampati score: III  TM Distance: >3 FB  Neck ROM: full     Dental   No notable dental hx     Cardiovascular  Rhythm: regular, Rate: normal,     Pulmonary  Pulmonary exam normal     Other Findings        Anesthesia Plan  ASA Score- 3     Anesthesia Type- IV sedation with anesthesia with ASA Monitors  Additional Monitors:   Airway Plan:     Comment: I discussed risks (reviewed with patient on the anesthesia consent form), benefits and alternatives of monitored sedation including the possibility under sedation to have recall or mild discomfort          Plan Factors-    Chart reviewed  EKG reviewed  Existing labs reviewed  Patient summary reviewed  Induction- intravenous  Postoperative Plan-     Informed Consent- Anesthetic plan and risks discussed with patient  I personally reviewed this patient with the CRNA  Discussed and agreed on the Anesthesia Plan with the CRNA  Ayaan Frances

## 2021-07-27 NOTE — NURSING NOTE
Pt arrived for ANDREY/CV procedure  EKG completed, patient in NSR  Dr Levar Guillen notified, he spoke with patient  Tests cancelled

## 2021-07-27 NOTE — TELEPHONE ENCOUNTER
Out of atorvastatin and and Mirna-Demetrius  Atorvastatin never made it to the pharmacy when it was sent last on 7/21/21  Patient requests a callback when this is sent

## 2021-08-03 ENCOUNTER — HOSPITAL ENCOUNTER (OUTPATIENT)
Dept: NON INVASIVE DIAGNOSTICS | Facility: HOSPITAL | Age: 81
Discharge: HOME | End: 2021-08-03
Attending: INTERNAL MEDICINE
Payer: MEDICARE

## 2021-08-03 DIAGNOSIS — H53.123 TRANSIENT VISUAL LOSS, BILATERAL: ICD-10-CM

## 2021-08-03 DIAGNOSIS — R27.0 ATAXIA: ICD-10-CM

## 2021-08-03 DIAGNOSIS — H53.9 VISION DISTURBANCE: ICD-10-CM

## 2021-08-03 PROCEDURE — 93880 EXTRACRANIAL BILAT STUDY: CPT

## 2021-08-03 PROCEDURE — 93880 EXTRACRANIAL BILAT STUDY: CPT | Performed by: SURGERY

## 2021-08-04 ENCOUNTER — OFFICE VISIT (OUTPATIENT)
Dept: PODIATRY | Facility: CLINIC | Age: 81
End: 2021-08-04
Payer: MEDICARE

## 2021-08-04 VITALS
SYSTOLIC BLOOD PRESSURE: 156 MMHG | WEIGHT: 230 LBS | HEART RATE: 71 BPM | DIASTOLIC BLOOD PRESSURE: 75 MMHG | BODY MASS INDEX: 32.93 KG/M2 | HEIGHT: 70 IN

## 2021-08-04 DIAGNOSIS — I48.0 PAROXYSMAL ATRIAL FIBRILLATION (HCC): ICD-10-CM

## 2021-08-04 DIAGNOSIS — M25.776: ICD-10-CM

## 2021-08-04 DIAGNOSIS — M72.2 PLANTAR FASCIITIS: ICD-10-CM

## 2021-08-04 DIAGNOSIS — M76.61 ACHILLES TENDINITIS OF BOTH LOWER EXTREMITIES: ICD-10-CM

## 2021-08-04 DIAGNOSIS — M54.50 BILATERAL LOW BACK PAIN WITHOUT SCIATICA, UNSPECIFIED CHRONICITY: ICD-10-CM

## 2021-08-04 DIAGNOSIS — M76.62 ACHILLES TENDINITIS OF BOTH LOWER EXTREMITIES: ICD-10-CM

## 2021-08-04 DIAGNOSIS — G57.53 TARSAL TUNNEL SYNDROME OF BOTH LOWER EXTREMITIES: Primary | ICD-10-CM

## 2021-08-04 DIAGNOSIS — M67.00 SHORT ACHILLES TENDON, UNSPECIFIED LATERALITY: ICD-10-CM

## 2021-08-04 PROCEDURE — 99214 OFFICE O/P EST MOD 30 MIN: CPT | Performed by: PODIATRIST

## 2021-08-04 RX ORDER — GABAPENTIN 300 MG/1
300 CAPSULE ORAL
Qty: 30 CAPSULE | Refills: 0 | Status: SHIPPED | OUTPATIENT
Start: 2021-08-04 | End: 2021-08-31

## 2021-08-04 NOTE — PROGRESS NOTES
PATIENT:  Oscar Kwon  1940       ASSESSMENT:     1  Tarsal tunnel syndrome of both lower extremities  gabapentin (NEURONTIN) 300 mg capsule    Ambulatory referral to Physical Therapy   2  Plantar fasciitis  gabapentin (NEURONTIN) 300 mg capsule    Ambulatory referral to Physical Therapy   3  Achilles tendinitis of both lower extremities  gabapentin (NEURONTIN) 300 mg capsule    Ambulatory referral to Physical Therapy   4  Osteophyte of foot, unspecified laterality     5  Short Achilles tendon, unspecified laterality     6  Bilateral low back pain without sciatica, unspecified chronicity  Ambulatory referral to Pain Management         PLAN:  1  Patient was counseled and educated on the condition and the diagnosis  2  Reviewed PT note  Referred him to PT again for management of LE disorder  3  Rx; Gabapentin for possible tarsal tunnel syndrome / neurologic pain in feet at night  4   Instructed supportive care, home exercise, icing, and proper footwear/ arch support  Discussed possible further images depending on the progress  5  Instructed compression, elevation, and diet to reduce any LE edema  6  Referred to Dr Adele Brittle for lower back pain and possible sciatica  7  Patient will return in 4 weeks for re-evaluation  Subjective:     HPI  The patient presents for follow-up on bilateral foot pain  He did not go to PT for his foot pain  He went to PT once since the last visit for his balance  He complained of burning pain in his ankles at night time  Pain is not too bad during the day  He has not been doing much stretching  He also hurt his back on Sunday when he was using a weed whacker  He has lower back pain  He feels some leathery sensation in his LE  He has chronic LE edema, but did not present with compression stockings          The following portions of the patient's history were reviewed and updated as appropriate: allergies, current medications, past family history, past medical history, past social history, past surgical history and problem list   All pertinent labs and images were reviewed  Past Medical History  Past Medical History:   Diagnosis Date    Atrial fibrillation (HCC)     BPH (benign prostatic hyperplasia)     CHF (congestive heart failure) (HCC)     GERD (gastroesophageal reflux disease)     Skin cancer     Sleep apnea        Past Surgical History  Past Surgical History:   Procedure Laterality Date    CARDIAC SURGERY      CHOLECYSTECTOMY      CORONARY ARTERY BYPASS GRAFT      CORONARY STENT PLACEMENT      TONSILLECTOMY      VALVE REPLACEMENT          Allergies:  Patient has no known allergies  Medications:  Current Outpatient Medications   Medication Sig Dispense Refill    atorvastatin (LIPITOR) 40 mg tablet Take 1 tablet (40 mg total) by mouth daily 90 tablet 3    famotidine (PEPCID) 20 mg tablet Take 1 tablet (20 mg total) by mouth 2 (two) times a day 085 tablet 3    folic acid (FOLVITE) 1 mg tablet Take 1 tablet (1 mg total) by mouth daily 30 tablet 5    furosemide (LASIX) 40 mg tablet Take 1 tablet (40 mg total) by mouth 2 (two) times a day 60 tablet 5    metoprolol tartrate (LOPRESSOR) 25 mg tablet Take 0 5 tablets (12 5 mg total) by mouth every 12 (twelve) hours 30 tablet 11    potassium chloride (Klor-Con 10) 10 mEq tablet Take 1 tablet (10 mEq total) by mouth 2 (two) times a day 180 tablet 3    pyridoxine (B-6) 100 MG tablet Take 120 mg by mouth      traMADol (ULTRAM) 50 mg tablet Take by mouth as needed       apixaban (ELIQUIS) 5 mg Take 1 tablet (5 mg total) by mouth 2 (two) times a day 60 tablet 5    gabapentin (NEURONTIN) 300 mg capsule Take 1 capsule (300 mg total) by mouth daily at bedtime 30 capsule 0     No current facility-administered medications for this visit         Social History:  Social History     Socioeconomic History    Marital status: /Civil Union     Spouse name: None    Number of children: None    Years of education: None    Highest education level: None   Occupational History    None   Tobacco Use    Smoking status: Never Smoker    Smokeless tobacco: Never Used   Vaping Use    Vaping Use: Never used   Substance and Sexual Activity    Alcohol use: Yes    Drug use: No    Sexual activity: None   Other Topics Concern    None   Social History Narrative    None     Social Determinants of Health     Financial Resource Strain:     Difficulty of Paying Living Expenses:    Food Insecurity:     Worried About Running Out of Food in the Last Year:     [address] in the Last Year:    Transportation Needs:     Lack of Transportation (Medical):  Lack of Transportation (Non-Medical):    Physical Activity:     Days of Exercise per Week:     Minutes of Exercise per Session:    Stress:     Feeling of Stress :    Social Connections:     Frequency of Communication with Friends and Family:     Frequency of Social Gatherings with Friends and Family:     Attends Scientologist Services:     Active Member of Clubs or Organizations:     Attends Club or Organization Meetings:     Marital Status:    Intimate Partner Violence:     Fear of Current or Ex-Partner:     Emotionally Abused:     Physically Abused:     Sexually Abused:           Review of Systems   Constitutional: Negative for appetite change, chills and fever  Respiratory: Negative for cough and shortness of breath  Cardiovascular: Positive for leg swelling  Negative for chest pain  Gastrointestinal: Negative for diarrhea, nausea and vomiting  Musculoskeletal: Positive for arthralgias and back pain  Skin: Negative for wound  Hematological: Bruises/bleeds easily  Objective:    /75   Pulse 71   Ht 5' 10" (1 778 m) Comment: verbal  Wt 104 kg (230 lb)   BMI 33 00 kg/m²        Physical Exam  Vitals reviewed  Constitutional:       General: He is not in acute distress  Appearance: He is obese   He is not toxic-appearing  HENT:      Head: Normocephalic and atraumatic  Cardiovascular:      Rate and Rhythm: Normal rate and regular rhythm  Pulses:           Dorsalis pedis pulses are 2+ on the right side and 2+ on the left side  Posterior tibial pulses are 0 on the right side and 0 on the left side  Comments: No ischemia  Venous stasis/ varicosity noted BLE  Pulmonary:      Effort: Pulmonary effort is normal  No respiratory distress  Musculoskeletal:         General: Tenderness present  No signs of injury  Cervical back: Normal range of motion and neck supple  Right lower le+ Edema present  Left lower le+ Edema present  Right ankle:      Right Achilles Tendon: No tenderness or defects  Duke's test negative  Left ankle:      Left Achilles Tendon: No tenderness or defects  Duke's test negative  Right foot: No foot drop  Left foot: No foot drop  Comments: Diffuse, moderate ankle swelling noted, left worse than right  Tenderness noted plantar heel bilaterally  Minimal pain at posterior heels  Tenderness noted bilateral tarsal tunnel and luis fernando pedis  Skin:     General: Skin is warm  Capillary Refill: Capillary refill takes less than 2 seconds  Coloration: Skin is not cyanotic or mottled  Findings: No abscess, ecchymosis, petechiae or wound  Nails: There is no clubbing  Neurological:      General: No focal deficit present  Mental Status: He is alert and oriented to person, place, and time  Cranial Nerves: No cranial nerve deficit  Sensory: No sensory deficit  Motor: No weakness  Psychiatric:         Mood and Affect: Mood normal          Behavior: Behavior normal          Thought Content:  Thought content normal          Judgment: Judgment normal

## 2021-08-04 NOTE — PATIENT INSTRUCTIONS
Plantar Fasciitis Exercises   AMBULATORY CARE:   What you need to know about plantar fasciitis exercises:  Plantar fasciitis exercises help stretch your plantar fascia, calf muscles, and Achilles tendon  They also help strengthen the muscles that support your heel and foot  Exercises and stretching can help prevent plantar fasciitis from getting worse or coming back  Contact your healthcare provider if:   · Your pain and swelling increase  · You develop new knee, hip, or back pain  · You have questions or concerns about your condition or care  How to do plantar fasciitis exercises:  Ask your healthcare provider when to start these exercises and how often to do them  · Slant board stretch:  Stand on a slanted board with your toes higher than your heel  Press your heel into the board  Keep your knee slightly bent  Hold this position for 1 minute  Repeat 5 times  · Heel stretch:  Stand up straight with your hands on a wall  Place your injured leg slightly behind your other leg  Keep your heels flat on the floor, lean forward, and bend both knees  Hold for 30 seconds  · Calf stretch:  Stand up straight with your hands on a wall  Step forward so that your uninjured foot is in front of your injured foot  Keep your front leg bent and your back leg straight  Gently lean forward until you feel your calf stretch  Hold for 30 seconds and then relax  · Seated plantar fascia stretch:  Sit on a firm surface, such as the floor or a mat  Extend your legs out in front of you  Raise your injured foot a few inches off the ground  Keep your leg straight  Grab the toes of your injured foot and pull them toward you  With your other hand, feel your plantar fascia  You should feel it press outward  Hold for 30 seconds  If you cannot reach your toes, loop a towel or tie around your foot  Gently pull on the towel or tie and flex your toes toward you  · Heel raises:  Stand on the injured leg   Raise your other leg off the ground  Hold onto a railing or wall for balance  Slowly rise up on the toes of your injured leg  Hold for 5 seconds  Slowly lower your heel to the ground  · Toe curls:  Place a towel on the floor  Put your foot flat on the towel  Grab the towel with your toes by curling them around the towel  Lift the towel up with your toes  · Toe taps:  Sit down and place your foot flat on the floor  Keep your heel on the floor  Point all your toes up toward the ceiling  While the 4 smaller toes are pointed up, bend your big toe down and tap it on the ground  Do 10 to 50 taps  Point all 5 toes up toward the ceiling again  This time keep your big toe pointed up and tap the 4 smaller toes on the ground  Do 10 to 50 taps each time  Follow up with your healthcare provider as directed:  Write down your questions so you remember to ask them during your visits  © Copyright [employer] 2021 Information is for End User's use only and may not be sold, redistributed or otherwise used for commercial purposes  All illustrations and images included in CareNotes® are the copyrighted property of A D A M , Inc  or Kimo Wagner   The above information is an  only  It is not intended as medical advice for individual conditions or treatments  Talk to your doctor, nurse or pharmacist before following any medical regimen to see if it is safe and effective for you

## 2021-08-05 ENCOUNTER — HOSPITAL ENCOUNTER (EMERGENCY)
Facility: HOSPITAL | Age: 81
Discharge: HOME | End: 2021-08-05
Attending: EMERGENCY MEDICINE
Payer: MEDICARE

## 2021-08-05 ENCOUNTER — APPOINTMENT (EMERGENCY)
Dept: CT IMAGING | Facility: HOSPITAL | Age: 81
End: 2021-08-05
Payer: MEDICARE

## 2021-08-05 VITALS
WEIGHT: 230 LBS | SYSTOLIC BLOOD PRESSURE: 189 MMHG | BODY MASS INDEX: 32.93 KG/M2 | TEMPERATURE: 98.4 F | DIASTOLIC BLOOD PRESSURE: 81 MMHG | OXYGEN SATURATION: 98 % | HEART RATE: 66 BPM | RESPIRATION RATE: 23 BRPM | HEIGHT: 70 IN

## 2021-08-05 DIAGNOSIS — S39.012A: ICD-10-CM

## 2021-08-05 DIAGNOSIS — M47.816 DEGENERATIVE JOINT DISEASE (DJD) OF LUMBAR SPINE: Primary | ICD-10-CM

## 2021-08-05 PROCEDURE — 99284 EMERGENCY DEPT VISIT MOD MDM: CPT | Performed by: EMERGENCY MEDICINE

## 2021-08-05 PROCEDURE — 72131 CT LUMBAR SPINE W/O DYE: CPT

## 2021-08-05 PROCEDURE — 93005 ELECTROCARDIOGRAM TRACING: CPT

## 2021-08-05 PROCEDURE — 99284 EMERGENCY DEPT VISIT MOD MDM: CPT

## 2021-08-05 PROCEDURE — G1004 CDSM NDSC: HCPCS

## 2021-08-05 RX ORDER — LIDOCAINE 50 MG/G
1 PATCH TOPICAL ONCE
Status: DISCONTINUED | OUTPATIENT
Start: 2021-08-05 | End: 2021-08-06 | Stop reason: HOSPADM

## 2021-08-05 RX ORDER — OXYCODONE HYDROCHLORIDE AND ACETAMINOPHEN 5; 325 MG/1; MG/1
1 TABLET ORAL ONCE
Status: COMPLETED | OUTPATIENT
Start: 2021-08-05 | End: 2021-08-05

## 2021-08-05 RX ORDER — OXYCODONE HYDROCHLORIDE AND ACETAMINOPHEN 5; 325 MG/1; MG/1
1 TABLET ORAL EVERY 4 HOURS PRN
Qty: 15 TABLET | Refills: 0 | Status: SHIPPED | OUTPATIENT
Start: 2021-08-05 | End: 2021-08-15

## 2021-08-05 RX ORDER — METHOCARBAMOL 500 MG/1
500 TABLET, FILM COATED ORAL ONCE
Status: COMPLETED | OUTPATIENT
Start: 2021-08-05 | End: 2021-08-05

## 2021-08-05 RX ORDER — METHOCARBAMOL 500 MG/1
500-1000 TABLET, FILM COATED ORAL 3 TIMES DAILY PRN
Qty: 30 TABLET | Refills: 0 | Status: SHIPPED | OUTPATIENT
Start: 2021-08-05 | End: 2021-08-11 | Stop reason: SDUPTHER

## 2021-08-05 RX ADMIN — OXYCODONE HYDROCHLORIDE AND ACETAMINOPHEN 1 TABLET: 5; 325 TABLET ORAL at 22:06

## 2021-08-05 RX ADMIN — LIDOCAINE 5% 1 PATCH: 700 PATCH TOPICAL at 22:45

## 2021-08-05 RX ADMIN — METHOCARBAMOL 500 MG: 500 TABLET ORAL at 22:07

## 2021-08-06 ENCOUNTER — TELEPHONE (OUTPATIENT)
Dept: PAIN MEDICINE | Facility: CLINIC | Age: 81
End: 2021-08-06

## 2021-08-06 ENCOUNTER — TELEPHONE (OUTPATIENT)
Dept: PHYSICAL THERAPY | Facility: OTHER | Age: 81
End: 2021-08-06

## 2021-08-06 LAB
ATRIAL RATE: 66 BPM
P AXIS: 84 DEGREES
PR INTERVAL: 158 MS
QRS AXIS: -47 DEGREES
QRSD INTERVAL: 108 MS
QT INTERVAL: 446 MS
QTC INTERVAL: 467 MS
T WAVE AXIS: 77 DEGREES
VENTRICULAR RATE: 66 BPM

## 2021-08-06 PROCEDURE — 93010 ELECTROCARDIOGRAM REPORT: CPT | Performed by: INTERNAL MEDICINE

## 2021-08-06 NOTE — TELEPHONE ENCOUNTER
LMOM FOR PATIENT TO RETURN OUR CALL    ADVISED WE HAVE NOT RECEIVED PREVIOUS PAIN MANAGEMENT RECORDS OR IMAGING     ONCE RECEIVED THEN DR MERCER TO REVIEW

## 2021-08-06 NOTE — ED PROVIDER NOTES
History  Chief Complaint   Patient presents with    Back Pain     Pt with lower back pain since monday, was weed wacking sunday and feels he pulled something, denies fall  Took tramadol @ 36     71-year-old male presents for evaluation central lower back pain that has been ongoing since Monday after he was using a new we whacker for an hour and a half  The patient tried taking tramadol without improvement  He denies any bowel or bladder dysfunction  He denies any radiating pain  He denies any saddle anesthesia  The patient states that he has a history of lumbar problems and has got injections in his back every 2-3 years for several years  Prior to Admission Medications   Prescriptions Last Dose Informant Patient Reported? Taking?    apixaban (ELIQUIS) 5 mg   No No   Sig: Take 1 tablet (5 mg total) by mouth 2 (two) times a day   atorvastatin (LIPITOR) 40 mg tablet   No No   Sig: Take 1 tablet (40 mg total) by mouth daily   famotidine (PEPCID) 20 mg tablet   No No   Sig: Take 1 tablet (20 mg total) by mouth 2 (two) times a day   folic acid (FOLVITE) 1 mg tablet   No No   Sig: Take 1 tablet (1 mg total) by mouth daily   furosemide (LASIX) 40 mg tablet   No No   Sig: Take 1 tablet (40 mg total) by mouth 2 (two) times a day   gabapentin (NEURONTIN) 300 mg capsule   No No   Sig: Take 1 capsule (300 mg total) by mouth daily at bedtime   metoprolol tartrate (LOPRESSOR) 25 mg tablet   No No   Sig: Take 0 5 tablets (12 5 mg total) by mouth every 12 (twelve) hours   potassium chloride (Klor-Con 10) 10 mEq tablet   No No   Sig: Take 1 tablet (10 mEq total) by mouth 2 (two) times a day   pyridoxine (B-6) 100 MG tablet   Yes No   Sig: Take 120 mg by mouth   traMADol (ULTRAM) 50 mg tablet  Spouse/Significant Other Yes No   Sig: Take by mouth as needed       Facility-Administered Medications: None       Past Medical History:   Diagnosis Date    Atrial fibrillation (HCC)     BPH (benign prostatic hyperplasia)     CHF (congestive heart failure) (HCC)     GERD (gastroesophageal reflux disease)     Skin cancer     Sleep apnea        Past Surgical History:   Procedure Laterality Date    CARDIAC SURGERY      CHOLECYSTECTOMY      CORONARY ARTERY BYPASS GRAFT      CORONARY STENT PLACEMENT      TONSILLECTOMY      VALVE REPLACEMENT         Family History   Problem Relation Age of Onset    Cancer Mother     Dementia Father      I have reviewed and agree with the history as documented  E-Cigarette/Vaping    E-Cigarette Use Never User      E-Cigarette/Vaping Substances     Social History     Tobacco Use    Smoking status: Never Smoker    Smokeless tobacco: Never Used   Vaping Use    Vaping Use: Never used   Substance Use Topics    Alcohol use: Yes    Drug use: No       Review of Systems   Constitutional: Negative for chills, fatigue and fever  HENT: Negative for sore throat  Respiratory: Negative for cough, chest tightness and shortness of breath  Cardiovascular: Negative for chest pain and palpitations  Gastrointestinal: Negative for abdominal pain, constipation, diarrhea, nausea and vomiting  Genitourinary: Negative for difficulty urinating and dysuria  Musculoskeletal: Positive for back pain  Skin: Negative for rash  Neurological: Negative for dizziness, seizures, syncope, weakness and headaches  All other systems reviewed and are negative  Physical Exam  Physical Exam  Vitals and nursing note reviewed  Constitutional:       General: He is not in acute distress  Appearance: He is well-developed  HENT:      Head: Normocephalic and atraumatic  Right Ear: External ear normal       Left Ear: External ear normal       Mouth/Throat:      Pharynx: No oropharyngeal exudate  Eyes:      General: No scleral icterus  Pupils: Pupils are equal, round, and reactive to light  Cardiovascular:      Rate and Rhythm: Normal rate and regular rhythm  Occasional extrasystoles are present  Heart sounds: Murmur heard  Pulmonary:      Effort: Pulmonary effort is normal  No respiratory distress  Breath sounds: Normal breath sounds  Abdominal:      General: Bowel sounds are normal       Palpations: Abdomen is soft  Tenderness: There is no abdominal tenderness  There is no guarding or rebound  Musculoskeletal:      Cervical back: Normal range of motion and neck supple  Lumbar back: Spasms, tenderness and bony tenderness present  Positive right straight leg raise test and positive left straight leg raise test    Skin:     General: Skin is warm and dry  Findings: No rash  Neurological:      Mental Status: He is alert and oriented to person, place, and time  Vital Signs  ED Triage Vitals [21 1722]   Temperature Pulse Respirations Blood Pressure SpO2   98 4 °F (36 9 °C) 62 16 158/67 99 %      Temp Source Heart Rate Source Patient Position - Orthostatic VS BP Location FiO2 (%)   Temporal Monitor Sitting Left arm --      Pain Score       Worst Possible Pain           Vitals:    21 1722 21 2200   BP: 158/67 (!) 189/81   Pulse: 62 66   Patient Position - Orthostatic VS: Sitting          Visual Acuity      ED Medications  Medications   lidocaine (LIDODERM) 5 % patch 1 patch (has no administration in time range)   oxyCODONE-acetaminophen (PERCOCET) 5-325 mg per tablet 1 tablet (1 tablet Oral Given 21)   methocarbamol (ROBAXIN) tablet 500 mg (500 mg Oral Given 21)       Diagnostic Studies  Results Reviewed     None                 CT lumbar spine without contrast   Final Result by Ramonia Apgar, MD (2220)      Chronic disc and facet degenerative change resulting in multilevel nerve root encroachment  Workstation performed: [id number]                    Procedures  ECG 12 Lead Documentation Only    Date/Time: 2021 8:52 PM  Performed by: Christopher Kyle DO  Authorized by:  Christopher Kyle DO     Indications / Diagnosis: Back pain  ECG reviewed by me, the ED Provider: yes    Patient location:  ED  Previous ECG:     Previous ECG:  Compared to current    Comparison ECG info:  7/27/2021    Similarity:  No change  Interpretation:     Interpretation: abnormal    Rate:     ECG rate:  66    ECG rate assessment: normal    Rhythm:     Rhythm: sinus rhythm    Ectopy:     Ectopy: PAC    QRS:     QRS axis:  Left    QRS intervals:  Normal  Conduction:     Conduction: normal    ST segments:     ST segments:  Normal  T waves:     T waves: normal               ED Course                             SBIRT 20yo+      Most Recent Value   SBIRT (22 yo +)   In order to provide better care to our patients, we are screening all of our patients for alcohol and drug use  Would it be okay to ask you these screening questions? No Filed at: 08/05/2021 9736                    MDM  Number of Diagnoses or Management Options  Acute myofascial strain of lumbar region  Degenerative joint disease (DJD) of lumbar spine  Diagnosis management comments: The plan is to obtain a CT lumbar spine to rule out acute fracture, compression fracture versus strain with the patient's sudden onset of pain after using a weed whacker    Discussed plan for treatment with muscle relaxants, pain control, lidocaine patch and referral to Comprehensive Spine program     The patient (and any family present) verbalized understanding of the discharge instructions and warnings that would necessitate return to the Emergency Department  All questions were answered prior to discharge         Amount and/or Complexity of Data Reviewed  Tests in the radiology section of CPT®: reviewed  Review and summarize past medical records: yes        Disposition  Final diagnoses:   Acute myofascial strain of lumbar region   Degenerative joint disease (DJD) of lumbar spine     Time reflects when diagnosis was documented in both MDM as applicable and the Disposition within this note     Time User Action Codes Description Comment    8/5/2021 10:33 PM Liat Joyce B Add [S39 012A] Acute myofascial strain of lumbar region     8/5/2021 10:33 PM Chica Rodriguez Add [S54 673] Degenerative joint disease (DJD) of lumbar spine     8/5/2021 10:34 PM Liat Joyce B Modify [S39 012A] Acute myofascial strain of lumbar region     8/5/2021 10:34 PM Chica Rodriguez Modify [X46 [id number]] Degenerative joint disease (DJD) of lumbar spine       ED Disposition     ED Disposition Condition Date/Time Comment    Discharge Stable Thu Aug 5, 2021 10:33 PM [place] discharge to home/self care              Follow-up Information     Follow up With Specialties Details Why Contact Info Additional Information    Uyen No MD Internal Medicine Call  For further evaluation [facility]  [phone]        [address] Emergency Department Emergency Medicine Go to  If symptoms worsen [address] [place], [address]  [address] [facility] Emergency Department, [address] [facility] [address]    [facility] Spine [facility] Physical Therapy   [phone]          Patient's Medications   Discharge Prescriptions    METHOCARBAMOL (ROBAXIN) 500 MG TABLET    Take 1-2 tablets (500-1,000 mg total) by mouth 3 (three) times a day as needed for muscle spasms       Start Date: 8/5/2021  End Date: --       Order Dose: 500-1,000 mg       Quantity: 30 tablet    Refills: 0    OXYCODONE-ACETAMINOPHEN (PERCOCET) 5-325 MG PER TABLET    Take 1 tablet by mouth every 4 (four) hours as needed for moderate pain for up to 10 daysMax Daily Amount: 6 tablets       Start Date: 8/5/2021  End Date: 8/15/2021       Order Dose: 1 tablet       Quantity: 15 tablet    Refills: 0         PDMP Review       Value Time User    PDMP Reviewed  Yes 8/5/2021 10:36 PM Dellar Apley, DO          ED Provider  Electronically Signed by Casimiro Vasquez,   08/05/21 3060

## 2021-08-06 NOTE — TELEPHONE ENCOUNTER
Voice mail/message left requesting patient to return call to [address] [facility] program including our hours of business and phone number  Kindly asked to LM with Full Name,  and Reminded CB may come from a non- number as the nurses are working remotely/off-site      Referral closed

## 2021-08-06 NOTE — TELEPHONE ENCOUNTER
Checo Mccollum spoke to Pt - Pt stated that he had his records transferred from [place] (sent to back fax number) - We could not find them    Checo Mccollum let Pt know that we are still waiting for records    Pt wanted to be seen same day for an injection - we could not accommodate that request

## 2021-08-11 ENCOUNTER — TELEPHONE (OUTPATIENT)
Dept: FAMILY MEDICINE CLINIC | Facility: HOSPITAL | Age: 81
End: 2021-08-11

## 2021-08-12 ENCOUNTER — TELEPHONE (OUTPATIENT)
Dept: PAIN MEDICINE | Facility: CLINIC | Age: 81
End: 2021-08-12

## 2021-08-12 NOTE — TELEPHONE ENCOUNTER
Spoke to Mary) records were faxed - shes not sure why we didn't receive them    Pt gave Dr name and info for us to reach out to them for records

## 2021-08-13 ENCOUNTER — OFFICE VISIT (OUTPATIENT)
Dept: FAMILY MEDICINE CLINIC | Facility: HOSPITAL | Age: 81
End: 2021-08-13
Payer: MEDICARE

## 2021-08-13 ENCOUNTER — TELEPHONE (OUTPATIENT)
Dept: PHYSICAL THERAPY | Facility: OTHER | Age: 81
End: 2021-08-13

## 2021-08-13 VITALS
WEIGHT: 233 LBS | SYSTOLIC BLOOD PRESSURE: 162 MMHG | HEIGHT: 70 IN | BODY MASS INDEX: 33.36 KG/M2 | HEART RATE: 60 BPM | OXYGEN SATURATION: 98 % | DIASTOLIC BLOOD PRESSURE: 82 MMHG

## 2021-08-13 DIAGNOSIS — M54.50 LUMBAR PAIN: Primary | ICD-10-CM

## 2021-08-13 DIAGNOSIS — S39.012A: ICD-10-CM

## 2021-08-13 PROCEDURE — 99214 OFFICE O/P EST MOD 30 MIN: CPT | Performed by: INTERNAL MEDICINE

## 2021-08-13 RX ORDER — METHOCARBAMOL 500 MG/1
500 TABLET, FILM COATED ORAL 3 TIMES DAILY PRN
Qty: 50 TABLET | Refills: 2 | Status: SHIPPED | OUTPATIENT
Start: 2021-08-13 | End: 2022-05-19

## 2021-08-13 RX ORDER — TRAMADOL HYDROCHLORIDE 50 MG/1
50 TABLET ORAL EVERY 6 HOURS PRN
Qty: 40 TABLET | Refills: 1 | Status: SHIPPED | OUTPATIENT
Start: 2021-08-13 | End: 2022-05-19 | Stop reason: SDUPTHER

## 2021-08-13 NOTE — PROGRESS NOTES
Assessment/Plan:       Diagnoses and all orders for this visit:    Lumbar pain    Acute myofascial strain of lumbar region  -     traMADol (ULTRAM) 50 mg tablet; Take 1 tablet (50 mg total) by mouth every 6 (six) hours as needed for moderate pain  -     methocarbamol (ROBAXIN) 500 mg tablet; Take 1 tablet (500 mg total) by mouth 3 (three) times a day as needed for muscle spasms          All of the above diagnoses have been assessed  Additional COMMENTS/PLAN:  Needs to get to acute spine program soon as possible  Subjective:      Patient ID: Elvia Munroe is a [de-identified] y o  male  HPI     Patient was weed whacking about 8-9 days ago and ends after that developed severe pain  Pain is severe in the lower back  There is some radiation into the buttocks but not below inguinal  Area  The following portions of the patient's history were revised and updated as appropriate: Problem list, allergies, med list, FH, SH, Past medical and surgical histories      Current Outpatient Medications   Medication Sig Dispense Refill    apixaban (ELIQUIS) 5 mg Take 1 tablet (5 mg total) by mouth 2 (two) times a day 60 tablet 5    atorvastatin (LIPITOR) 40 mg tablet Take 1 tablet (40 mg total) by mouth daily 90 tablet 3    famotidine (PEPCID) 20 mg tablet Take 1 tablet (20 mg total) by mouth 2 (two) times a day 474 tablet 3    folic acid (FOLVITE) 1 mg tablet Take 1 tablet (1 mg total) by mouth daily 30 tablet 5    furosemide (LASIX) 40 mg tablet Take 1 tablet (40 mg total) by mouth 2 (two) times a day 60 tablet 5    methocarbamol (ROBAXIN) 500 mg tablet Take 1 tablet (500 mg total) by mouth 3 (three) times a day as needed for muscle spasms 50 tablet 2    metoprolol tartrate (LOPRESSOR) 25 mg tablet Take 0 5 tablets (12 5 mg total) by mouth every 12 (twelve) hours 30 tablet 11    oxyCODONE-acetaminophen (PERCOCET) 5-325 mg per tablet Take 1 tablet by mouth every 4 (four) hours as needed for moderate pain for up to 10 daysMax Daily Amount: 6 tablets 15 tablet 0    potassium chloride (Klor-Con 10) 10 mEq tablet Take 1 tablet (10 mEq total) by mouth 2 (two) times a day 180 tablet 3    pyridoxine (B-6) 100 MG tablet Take 120 mg by mouth      gabapentin (NEURONTIN) 300 mg capsule Take 1 capsule (300 mg total) by mouth daily at bedtime (Patient not taking: Reported on 8/13/2021) 30 capsule 0    traMADol (ULTRAM) 50 mg tablet Take 1 tablet (50 mg total) by mouth every 6 (six) hours as needed for moderate pain 40 tablet 1     No current facility-administered medications for this visit  Review of Systems   All other systems reviewed and are negative  Objective:    /82   Pulse 60   Ht 5' 10" (1 778 m)   Wt 106 kg (233 lb)   SpO2 98%   BMI 33 43 kg/m²     BP Readings from Last 3 Encounters:   08/13/21 162/82   08/05/21 (!) 189/81   08/04/21 156/75                  Wt Readings from Last 3 Encounters:   08/13/21 106 kg (233 lb)   08/05/21 104 kg (230 lb)   08/04/21 104 kg (230 lb)         Physical Exam  Musculoskeletal:      Comments: There is no spasm lumbar spine  Some tenderness over the left SI joint  Neg SLR  Admission on 08/05/2021, Discharged on 08/05/2021   Component Date Value Ref Range Status    Ventricular Rate 08/05/2021 66  BPM Final    Atrial Rate 08/05/2021 66  BPM Final    MI Interval 08/05/2021 158  ms Final    QRSD Interval 08/05/2021 108  ms Final    QT Interval 08/05/2021 446  ms Final    QTC Interval 08/05/2021 467  ms Final    P Axis 08/05/2021 84  degrees Final    QRS Axis 08/05/2021 -47  degrees Final    T Wave Campbell 08/05/2021 77  degrees Final         Kathleen Nguyen MD    Some or all of this note was generated with a voice recognition dictation system and therefore my contain grammatical or spelling errors

## 2021-08-13 NOTE — TELEPHONE ENCOUNTER
Call placed to the patient per his PCP office request     Voice message left for patient to call back  Phone number and hours of business provided       Will wait for call back to discuss our program

## 2021-08-16 ENCOUNTER — EVALUATION (OUTPATIENT)
Dept: PHYSICAL THERAPY | Facility: CLINIC | Age: 81
End: 2021-08-16
Payer: MEDICARE

## 2021-08-16 VITALS — HEART RATE: 60 BPM | SYSTOLIC BLOOD PRESSURE: 162 MMHG | DIASTOLIC BLOOD PRESSURE: 82 MMHG

## 2021-08-16 DIAGNOSIS — M54.50 ACUTE MIDLINE LOW BACK PAIN WITHOUT SCIATICA: Primary | ICD-10-CM

## 2021-08-16 DIAGNOSIS — M51.36 DDD (DEGENERATIVE DISC DISEASE), LUMBAR: ICD-10-CM

## 2021-08-16 PROCEDURE — 97140 MANUAL THERAPY 1/> REGIONS: CPT | Performed by: PHYSICAL THERAPIST

## 2021-08-16 PROCEDURE — 97161 PT EVAL LOW COMPLEX 20 MIN: CPT | Performed by: PHYSICAL THERAPIST

## 2021-08-16 NOTE — PROGRESS NOTES
PT Evaluation     Today's date: 2021  Patient name: Shiela Mcdonnell  : 1940  MRN: [id number]  Referring provider: Pedro Jordan MD  Dx:   Encounter Diagnosis     ICD-10-CM    1  Acute midline low back pain without sciatica  M54 5    2  DDD (degenerative disc disease), lumbar  M51 36                   Assessment  Assessment details: Shiela Mcdonnell is a [de-identified] y o  male presenting to outpatient physical therapy at [place] [address] with complaints of severe LBP  He is having the most difficulty when walking, rolling in bed and rising from sitting due to pain  Patient was referred to PT through [place] [address]  He would benefit from skilled PT services with focus on manual treatment to address his hypomobility issues, stretching and core strengthening in order to return to all normal activities and reach maximum level of function  No further referral appears necessary at this time based upon examination results, but may do well with a pain management consult  Primary movement impairment diagnosis of hypomobility resulting in pathoanatomical symptoms of lumbar dysfunction due to severe DDD with advanced foraminal stenosis  Pt would benefit from e-stim for disuse atrophy In bilateral quads for strength and function  He has decreased AROM in B quads  Impairments include:    1  Pain in endrange lumbar extension - addressing with mobs and flexibility  2  Decreased core strength - addressing with HEP strengthening  3  Limited flexibility - addressing with HEP  4  Poor postural awareness and body mechanics - addressing with education    Impairments: abnormal gait, abnormal or restricted ROM, activity intolerance, impaired balance, impaired physical strength, lacks appropriate home exercise program, pain with function, poor posture  and poor body mechanics  Barriers to therapy: None  Understanding of Dx/Px/POC: excellent   Prognosis: good    Goals  ST    Independent with HEP in 2 weeks  2  Increase lumbar AROM to WNL all motions in 3 weeks   3  Good body mechanics and bed mobility in 2 weeks    LT  Achieve FOTO score of 50/100 in 4 weeks   2  Able to complete all ADLs for housework  in 4 weeks  3  Strength abdominals = 4/5 in 4 weeks      Plan  Patient would benefit from: skilled PT and PT eval  Planned modality interventions: cryotherapy, TENS and thermotherapy: hydrocollator packs  Planned therapy interventions: abdominal trunk stabilization, ADL retraining, balance/weight bearing training, body mechanics training, flexibility, functional ROM exercises, home exercise program, joint mobilization, manual therapy, neuromuscular re-education, postural training, strengthening, stretching, therapeutic activities and therapeutic exercise  Frequency: 2x week  Duration in weeks: 4  Plan of Care beginning date: 2021  Plan of Care expiration date: 9/15/2021  Treatment plan discussed with: patient        Subjective Evaluation    History of Present Illness  Mechanism of injury: Patient reports having severe LBP after weed whacking on 21  Pain was so bad that he went to the ER on 21  He has had some radiation into the buttocks but not below inguinal area  He also has hx of B plantar fasciitis, achilles tendonitis and tarsal tunnel syndrome  Has had PT for ataxia recently with dizziness getting OOB  He has hx of LBP, but not to this extent  Pt has A-fib and CHF  Unable to do any housework  Unable to sleep in any position aside from on his back with feet elevated  Can not roll in bed    Has had some pain relief from lumbar injections in the past             Recurrent probem    Quality of life: fair    Pain  Current pain rating: 10  At best pain rating: 10  At worst pain rating: 10  Quality: tight and knife-like  Relieving factors: heat, rest and ice  Progression: no change    Social Support  Steps to enter house: yes  Stairs in house: yes   Lives in: multiple-level home  Lives with: spouse    Employment status: not working    Diagnostic Tests  CT scan: abnormal (Chronic disc and facet degenerative change resulting in multilevel nerve root encroachment     Worse R>L with mod-severe foraminal stenosis )  Treatments  Previous treatment: physical therapy and medication  Current treatment: medication  Patient Goals  Patient goals for therapy: increased strength, independence with ADLs/IADLs, return to sport/leisure activities, improved balance, increased motion and decreased pain          Objective     Concurrent Complaints  Positive for cardiac problem  Negative for night pain, disturbed sleep, bladder dysfunction and bowel dysfunction    Static Posture     Lumbar Spine   Flattened  Postural Observations  Seated posture: fair  Standing posture: fair  Correction of posture: has no consistent effect        Palpation   Left   No palpable tenderness to the erector spinae  Hypertonic in the erector spinae  Right   No palpable tenderness to the erector spinae  Hypertonic in the erector spinae  Tenderness     Lumbar Spine  Tenderness in the spinous process (L3-S1)       Neurological Testing     Sensation     Lumbar   Left   Intact: light touch    Right   Intact: light touch    Active Range of Motion     Lumbar   Flexion:  with pain Restriction level: maximal  Extension:  with pain Restriction level: minimal  Left lateral flexion:  with pain Restriction level: maximal  Right lateral flexion:  with pain Restriction level: maximal  Left rotation:  with pain Restriction level: maximal  Right rotation:  with pain Restriction level: maximal    Strength/Myotome Testing     Left Hip   Planes of Motion   Flexion: 4-  Abduction: 4  Adduction: 5    Right Hip   Planes of Motion   Flexion: 4-  Abduction: 4  Adduction: 5    Left Knee   Flexion: 4+  Extension: 4+    Right Knee   Flexion: 4+  Extension: 4+    Left Ankle/Foot   Dorsiflexion: 5    Right Ankle/Foot   Dorsiflexion: 5    Additional Strength Details  Abdominals = 2/5  Tests     Lumbar   Negative prone instability   Ambulation     Observational Gait   Decreased walking speed and stride length  Additional Observational Gait Details  Poor trunk rotation B  General Comments:      Lumbar Comments  Min disuse atrophy starting in B LEs since onset                POC EXPIRES On:  9/15/21  PRECAUTIONS:  None  CO-MORBIDITES:  A-fib, CHF  PERSONAL FACTORS:  None      Manuals HEP 8/16           STM B lumbar paraspinals in sitting  8'                                                  Neuro Re-Ed     Supine PPT 8/16 5" 10                                                                                         Ther Ex    Supine LTR B 8/16 5" 5 ea           Seated H/S stretch L/R 8/16 10" 5 ea           Seated trunk rotation L/r 8/16 10" 5 ea                                                                            Ther Activity    Bed mobility training  5'                        Gait Training                              Modalities    TENS

## 2021-08-19 ENCOUNTER — TELEPHONE (OUTPATIENT)
Dept: PAIN MEDICINE | Facility: CLINIC | Age: 81
End: 2021-08-19

## 2021-08-20 ENCOUNTER — OFFICE VISIT (OUTPATIENT)
Dept: PHYSICAL THERAPY | Facility: CLINIC | Age: 81
End: 2021-08-20
Payer: MEDICARE

## 2021-08-20 DIAGNOSIS — M51.36 DDD (DEGENERATIVE DISC DISEASE), LUMBAR: ICD-10-CM

## 2021-08-20 DIAGNOSIS — M54.50 ACUTE MIDLINE LOW BACK PAIN WITHOUT SCIATICA: Primary | ICD-10-CM

## 2021-08-20 PROCEDURE — 97014 ELECTRIC STIMULATION THERAPY: CPT | Performed by: PHYSICAL THERAPIST

## 2021-08-20 NOTE — PROGRESS NOTES
Daily Note     Today's date: 2021  Patient name: Markell Dyer  : 1940  MRN: [id number]  Referring provider: Trever Mcfadden MD  Dx:   Encounter Diagnosis     ICD-10-CM    1  Acute midline low back pain without sciatica  M54 5    2  DDD (degenerative disc disease), lumbar  M51 36                   Subjective:  Pt reports being in severe pain  Napping a little, but pain is keeping him from sleeping or any walking  Tried to contact pain management and reports he can't be seen until 21  Objective:  See treatment diary below      Assessment:  Pt presented to outpatient physical therapy at [place] [address] with complaints of severe LBP  He is having the most difficulty when walking, rolling in bed and rising from sitting due to pain  Patient was referred to PT through [address]  He will continue to benefit from skilled PT services with focus on manual treatment to address his hypomobility issues, stretching and core strengthening in order to return to all normal activities and reach maximum level of function  Pt had min less pain today after tx, but still very guarded in movement  He may do well with an PARKER to lessen pain to allow for more involved PT  Plan:  Contact Dr Jocelin Thompson about pain consult          POC EXPIRES On:  9/15/21  PRECAUTIONS:  None  CO-MORBIDITES:  A-fib, CHF  PERSONAL FACTORS:  None      Manuals HEP           STM B lumbar paraspinals in sitting  8 12'                                                 Neuro Re-Ed     Supine PPT  5" 10                                                                                         Ther Ex    Supine LTR B  5" 5 ea           Seated H/S stretch L/R  10" 5 ea           Seated trunk rotation L/R  10" 5 ea                                                                            Ther Activity    Bed mobility training  5'                        Gait Training                              Modalities TENS   20'

## 2021-08-25 ENCOUNTER — OFFICE VISIT (OUTPATIENT)
Dept: PHYSICAL THERAPY | Facility: CLINIC | Age: 81
End: 2021-08-25
Payer: MEDICARE

## 2021-08-25 DIAGNOSIS — M51.36 DDD (DEGENERATIVE DISC DISEASE), LUMBAR: ICD-10-CM

## 2021-08-25 DIAGNOSIS — M54.50 ACUTE MIDLINE LOW BACK PAIN WITHOUT SCIATICA: Primary | ICD-10-CM

## 2021-08-25 PROCEDURE — 97140 MANUAL THERAPY 1/> REGIONS: CPT | Performed by: PHYSICAL THERAPIST

## 2021-08-25 PROCEDURE — 97110 THERAPEUTIC EXERCISES: CPT | Performed by: PHYSICAL THERAPIST

## 2021-08-25 NOTE — PROGRESS NOTES
Daily Note     Today's date: 2021  Patient name: Shiela Mcdonnell  : 1940  MRN: [id number]  Referring provider: Pedro Jordan MD  Dx:   Encounter Diagnosis     ICD-10-CM    1  Acute midline low back pain without sciatica  M54 5    2  DDD (degenerative disc disease), lumbar  M51 36                   Subjective:  Pt reports being able to get OOB on his own today for the first time  Was not able to do that yesterday  Pain is still bad in B LB  Unable to get into see Dr Lupe Membreno until 21  Walking with his SPC  Objective:  See treatment diary below      Assessment:  Pt presented to outpatient physical therapy at [place] [address] with complaints of severe LBP  He is having the most difficulty when walking, rolling in bed and rising from sitting due to pain  Patient was referred to PT through [place] [address]  He will continue to benefit from skilled PT services with focus on manual treatment to address his hypomobility issues, stretching and core strengthening in order to return to all normal activities and reach maximum level of function  Pt is still very guarded in movement, but ambulating faster vs last week  He may do well with an PARKER to lessen pain to allow for more involved PT  Pt is getting moderate pain relief from TENS and may do well with a home TENS unit - [employer] contacted  Plan: To Dr Lupe Membreno on 21 and Dr Elizabeth Patel on 21  Continue Pre-mod stim and gentle ROM          POC EXPIRES On:  9/15/21  PRECAUTIONS:  None  CO-MORBIDITES:  A-fib, CHF  PERSONAL FACTORS:  None      Manuals HEP          STM B lumbar paraspinals in sitting  ' 15'                                                Neuro Re-Ed     Supine PPT  5" 10                                                                                         Ther Ex    Supine LTR B  5" 5 ea  5" 5 ea         Seated H/S stretch L/R  10" 5 ea  10" 5 ea         Seated trunk rotation L/R 8/16 10" 5 ea  10" 5 ea                                                                          Ther Activity    Bed mobility training  5'                        Gait Training                              Modalities    Pre-mod stim sitting B LB   20' 20'

## 2021-08-27 ENCOUNTER — OFFICE VISIT (OUTPATIENT)
Dept: PHYSICAL THERAPY | Facility: CLINIC | Age: 81
End: 2021-08-27
Payer: MEDICARE

## 2021-08-27 DIAGNOSIS — M54.50 ACUTE MIDLINE LOW BACK PAIN WITHOUT SCIATICA: Primary | ICD-10-CM

## 2021-08-27 DIAGNOSIS — M51.36 DDD (DEGENERATIVE DISC DISEASE), LUMBAR: ICD-10-CM

## 2021-08-27 PROCEDURE — 97014 ELECTRIC STIMULATION THERAPY: CPT | Performed by: PHYSICAL THERAPIST

## 2021-08-27 PROCEDURE — 97110 THERAPEUTIC EXERCISES: CPT | Performed by: PHYSICAL THERAPIST

## 2021-08-27 PROCEDURE — 97140 MANUAL THERAPY 1/> REGIONS: CPT | Performed by: PHYSICAL THERAPIST

## 2021-08-27 NOTE — PROGRESS NOTES
Daily Note     Today's date: 2021  Patient name: Elvia Munroe  : 1940  MRN: [id number]  Referring provider: Josr Plaza MD  Dx:   Encounter Diagnosis     ICD-10-CM    1  Acute midline low back pain without sciatica  M54 5    2  DDD (degenerative disc disease), lumbar  M51 36                   Subjective:  Pt reports getting some pain relief from e-stim in PT  Would love to get a home unit  Had his appt with Dr Valentino Shiner cancelled, but was able to schedule with Dr Misty Cruz on 21  Was able to get OOB alone today for the first time since onset of his pain  Objective:  See treatment diary below      Assessment:  Pt presented to outpatient physical therapy at [place] [address] with complaints of severe LBP  He is having the most difficulty when walking, rolling in bed and rising from sitting due to pain  Patient was referred to PT through [place] [address]  He will continue to benefit from skilled PT services with focus on manual treatment to address his hypomobility issues, stretching and core strengthening in order to return to all normal activities and reach maximum level of function  Pt is still very guarded in movement, but ambulating faster vs last session with some trunk rotation for the first time today since onset of pain  He may do well with an PARKER to lessen pain to allow for more involved PT  Pt is getting moderate pain relief from TENS and may do well with a home TENS unit - [facility] contacted and advised a unit should be ready early next week  He is becoming more functional with PT  Plan: To Dr Misty Cruz on 21 and Dr Calista Wei on 21  Continue Pre-mod stim and gentle ROM  Add more strengthening next week - TB Rows B  Issue home TENS next week          POC EXPIRES On:  9/15/21  PRECAUTIONS:  None  CO-MORBIDITES:  A-fib, CHF  PERSONAL FACTORS:  None      Manuals HEP         STM B lumbar paraspinals in sitting   12' 15' 15' Neuro Re-Ed     Supine PPT 8/16 5" 10                                                                                         Ther Ex    Supine LTR B 8/16 5" 5 ea  5" 5 ea         Seated H/S stretch L/R 8/16 10" 5 ea  10" 5 ea 10" 5 ea        Seated trunk rotation L/R 8/16 10" 5 ea  10" 5 ea 10" 5 ea                                                                         Ther Activity    Bed mobility training  5'                        Gait Training                              Modalities    Pre-mod stim sitting B LB   20' 20' 24'

## 2021-08-30 ENCOUNTER — OFFICE VISIT (OUTPATIENT)
Dept: PHYSICAL THERAPY | Facility: CLINIC | Age: 81
End: 2021-08-30
Payer: MEDICARE

## 2021-08-30 DIAGNOSIS — M51.36 DDD (DEGENERATIVE DISC DISEASE), LUMBAR: ICD-10-CM

## 2021-08-30 DIAGNOSIS — M54.50 ACUTE MIDLINE LOW BACK PAIN WITHOUT SCIATICA: Primary | ICD-10-CM

## 2021-08-30 PROCEDURE — 97014 ELECTRIC STIMULATION THERAPY: CPT | Performed by: PHYSICAL THERAPIST

## 2021-08-30 PROCEDURE — 97140 MANUAL THERAPY 1/> REGIONS: CPT | Performed by: PHYSICAL THERAPIST

## 2021-08-30 NOTE — PROGRESS NOTES
Daily Note     Today's date: 2021  Patient name: Geo Burns  : 1940  MRN: [id number]  Referring provider: Madina Christianson MD  Dx:   Encounter Diagnosis     ICD-10-CM    1  Acute midline low back pain without sciatica  M54 5    2  DDD (degenerative disc disease), lumbar  M51 36                   Subjective:  Pt reports getting some pain relief from PT and was able to get OOB on his own today  Some days are not as good though  Will have PARKER on 21  Objective:  See treatment diary below  Pt and pt's wife educated in home TENS today  Assessment:  Pt presented to outpatient physical therapy at [place] [address] with complaints of severe LBP  He is having the most difficulty when walking, rolling in bed and rising from sitting due to pain  Patient was referred to PT through [place] [address]  He will continue to benefit from skilled PT services with focus on manual treatment to address his hypomobility issues, stretching and core strengthening in order to return to all normal activities and reach maximum level of function  Pt is still very guarded in movement, but ambulating faster each session  He will do well with an PARKER to lessen pain to allow for more involved PT as well as with home TENS unit  He is becoming more functional with PT despite still having moderate pain most of the time  Pt and pt's wife now both independent with home TENS  Plan: To Dr Kirti Loza on 21 and Dr Jacqueline Sosa on 21  Pt to use home TENS 3x/day x 30 min each starting today     Add more strengthening after MD appt this week - TB Rows B           POC EXPIRES On:  9/15/21  PRECAUTIONS:  None  CO-MORBIDITES:  A-fib, CHF  PERSONAL FACTORS:  None      Manuals HEP        STM B lumbar paraspinals in sitting  8' 12' 15' 15' 12'                                              Neuro Re-Ed     Supine PPT  5" 10 Ther Ex    Supine LTR B 8/16 5" 5 ea  5" 5 ea         Seated H/S stretch L/R 8/16 10" 5 ea  10" 5 ea 10" 5 ea 10" 5       Seated trunk rotation L/R 8/16 10" 5 ea  10" 5 ea 10" 5 ea 10" 5                                                                        Ther Activity    Bed mobility training  5'                        Gait Training                              Modalities    Pre-mod stim sitting B LB   20' 20' 24' 23'

## 2021-08-31 ENCOUNTER — OFFICE VISIT (OUTPATIENT)
Dept: FAMILY MEDICINE CLINIC | Facility: HOSPITAL | Age: 81
End: 2021-08-31
Payer: MEDICARE

## 2021-08-31 VITALS
HEART RATE: 52 BPM | SYSTOLIC BLOOD PRESSURE: 162 MMHG | WEIGHT: 209.8 LBS | DIASTOLIC BLOOD PRESSURE: 80 MMHG | BODY MASS INDEX: 30.03 KG/M2 | HEIGHT: 70 IN

## 2021-08-31 DIAGNOSIS — M54.50 LUMBAR PAIN: Primary | ICD-10-CM

## 2021-08-31 DIAGNOSIS — R26.89 BALANCE DISORDER: ICD-10-CM

## 2021-08-31 PROCEDURE — 99213 OFFICE O/P EST LOW 20 MIN: CPT | Performed by: INTERNAL MEDICINE

## 2021-08-31 NOTE — PROGRESS NOTES
Assessment/Plan:       Diagnoses and all orders for this visit:    Lumbar pain    Balance disorder          All of the above diagnoses have been assessed  Additional COMMENTS/PLAN: will       Subjective:      Patient ID: Keegan Hernandez is a [de-identified] y o  male  HPI     Pt has been trying to get apt with pain management  Has secured apt for this week  His pain is not better  This is lower back with no radicular component  Did not tolerate the gabapentin  Claims her had nightmares  The following portions of the patient's history were revised and updated as appropriate: Problem list, allergies, med list, FH, SH, Past medical and surgical histories  Current Outpatient Medications   Medication Sig Dispense Refill    apixaban (ELIQUIS) 5 mg Take 1 tablet (5 mg total) by mouth 2 (two) times a day 60 tablet 5    atorvastatin (LIPITOR) 40 mg tablet Take 1 tablet (40 mg total) by mouth daily 90 tablet 3    famotidine (PEPCID) 20 mg tablet Take 1 tablet (20 mg total) by mouth 2 (two) times a day 836 tablet 3    folic acid (FOLVITE) 1 mg tablet Take 1 tablet (1 mg total) by mouth daily 30 tablet 5    furosemide (LASIX) 40 mg tablet Take 1 tablet (40 mg total) by mouth 2 (two) times a day 60 tablet 5    methocarbamol (ROBAXIN) 500 mg tablet Take 1 tablet (500 mg total) by mouth 3 (three) times a day as needed for muscle spasms 50 tablet 2    metoprolol tartrate (LOPRESSOR) 25 mg tablet Take 0 5 tablets (12 5 mg total) by mouth every 12 (twelve) hours 30 tablet 11    potassium chloride (Klor-Con 10) 10 mEq tablet Take 1 tablet (10 mEq total) by mouth 2 (two) times a day 180 tablet 3    pyridoxine (B-6) 100 MG tablet Take 120 mg by mouth      traMADol (ULTRAM) 50 mg tablet Take 1 tablet (50 mg total) by mouth every 6 (six) hours as needed for moderate pain 40 tablet 1     No current facility-administered medications for this visit  Review of Systems   All other systems reviewed and are negative  Objective:    /80 (BP Location: Left arm, Patient Position: Sitting, Cuff Size: Standard)   Pulse (!) 52   Ht 5' 10" (1 778 m)   Wt 95 2 kg (209 lb 12 8 oz)   BMI 30 10 kg/m²     BP Readings from Last 3 Encounters:   08/31/21 162/80   08/16/21 162/82   08/13/21 162/82                  Wt Readings from Last 3 Encounters:   08/31/21 95 2 kg (209 lb 12 8 oz)   08/13/21 106 kg (233 lb)   08/05/21 104 kg (230 lb)         Physical Exam  Vitals reviewed  Musculoskeletal:      Comments: Still with some spasm of the lumbar area  Neurological:      Mental Status: He is alert  No visits with results within 2 Week(s) from this visit  Latest known visit with results is:   Admission on 08/05/2021, Discharged on 08/05/2021   Component Date Value Ref Range Status    Ventricular Rate 08/05/2021 66  BPM Final    Atrial Rate 08/05/2021 66  BPM Final    NH Interval 08/05/2021 158  ms Final    QRSD Interval 08/05/2021 108  ms Final    QT Interval 08/05/2021 446  ms Final    QTC Interval 08/05/2021 467  ms Final    P Axis 08/05/2021 84  degrees Final    QRS Axis 08/05/2021 -47  degrees Final    T Wave [place] 08/05/2021 77  degrees Final         Mayda Kee MD    Some or all of this note was generated with a voice recognition dictation system and therefore my contain grammatical or spelling errors

## 2021-09-01 ENCOUNTER — TELEPHONE (OUTPATIENT)
Dept: CARDIOLOGY CLINIC | Facility: CLINIC | Age: 81
End: 2021-09-01

## 2021-09-01 ENCOUNTER — OFFICE VISIT (OUTPATIENT)
Dept: PODIATRY | Facility: CLINIC | Age: 81
End: 2021-09-01
Payer: MEDICARE

## 2021-09-01 VITALS
HEIGHT: 70 IN | BODY MASS INDEX: 30.21 KG/M2 | SYSTOLIC BLOOD PRESSURE: 177 MMHG | HEART RATE: 66 BPM | DIASTOLIC BLOOD PRESSURE: 79 MMHG | WEIGHT: 211 LBS

## 2021-09-01 DIAGNOSIS — M25.776: ICD-10-CM

## 2021-09-01 DIAGNOSIS — M72.2 PLANTAR FASCIITIS: Primary | ICD-10-CM

## 2021-09-01 DIAGNOSIS — M76.62 ACHILLES TENDINITIS OF BOTH LOWER EXTREMITIES: ICD-10-CM

## 2021-09-01 DIAGNOSIS — M76.61 ACHILLES TENDINITIS OF BOTH LOWER EXTREMITIES: ICD-10-CM

## 2021-09-01 PROCEDURE — 99213 OFFICE O/P EST LOW 20 MIN: CPT | Performed by: PODIATRIST

## 2021-09-01 NOTE — TELEPHONE ENCOUNTER
Pt's wife called - has not taken Eliquis yesterday and today because they found the medicare deductible cost prohibitive  Wife would like to switch back to Coumadin however we were not managing that - was followed by Cardio in [place]  They will be going to [place] this month  Asked wife to  2 weeks of Eliquis in office today and have pt restart today  Did encourage them not to stop  Also asked wife to contact Coumadin Nurse at [address] office re: transitioning back to Coumadin with them

## 2021-09-02 ENCOUNTER — TELEPHONE (OUTPATIENT)
Dept: CARDIOLOGY CLINIC | Facility: CLINIC | Age: 81
End: 2021-09-02

## 2021-09-02 ENCOUNTER — TELEPHONE (OUTPATIENT)
Dept: RADIOLOGY | Facility: CLINIC | Age: 81
End: 2021-09-02

## 2021-09-02 ENCOUNTER — CONSULT (OUTPATIENT)
Dept: PAIN MEDICINE | Facility: CLINIC | Age: 81
End: 2021-09-02
Payer: MEDICARE

## 2021-09-02 VITALS
BODY MASS INDEX: 29.92 KG/M2 | HEIGHT: 70 IN | DIASTOLIC BLOOD PRESSURE: 71 MMHG | WEIGHT: 209 LBS | SYSTOLIC BLOOD PRESSURE: 151 MMHG | HEART RATE: 57 BPM

## 2021-09-02 DIAGNOSIS — M47.816 LUMBAR SPONDYLOSIS: ICD-10-CM

## 2021-09-02 DIAGNOSIS — M51.36 DDD (DEGENERATIVE DISC DISEASE), LUMBAR: ICD-10-CM

## 2021-09-02 DIAGNOSIS — M48.061 SPINAL STENOSIS OF LUMBAR REGION, UNSPECIFIED WHETHER NEUROGENIC CLAUDICATION PRESENT: Primary | ICD-10-CM

## 2021-09-02 PROCEDURE — 99204 OFFICE O/P NEW MOD 45 MIN: CPT | Performed by: ANESTHESIOLOGY

## 2021-09-02 PROCEDURE — 1123F ACP DISCUSS/DSCN MKR DOCD: CPT | Performed by: ANESTHESIOLOGY

## 2021-09-02 NOTE — PATIENT INSTRUCTIONS
Epidural Steroid Injection   WHAT YOU NEED TO KNOW:   What do I need to know about an epidural steroid injection (PARKER)? An PARKER is a procedure to inject steroid medicine into the epidural space  The epidural space is between your spinal cord and vertebrae  Steroids reduce inflammation and fluid buildup in your spine that may be causing pain  You may be given pain medicine along with the steroids  How do I prepare for an PARKER? Your healthcare provider will talk to you about how to prepare for your procedure  He or she will tell you what medicines to take or not take on the day of your procedure  You may need to stop taking blood thinners or other medicines several days before your procedure  You may need to adjust any diabetes medicine you take on the day of your procedure  Steroid medicine can increase your blood sugar level  Arrange for someone to drive you home when you are discharged  What will happen during an PARKER? · You will be given medicine to numb the procedure area  You will be awake for the procedure, but you will not feel pain  You may also be given medicine to help you relax  Contrast liquid will be used to help your healthcare provider see the area better  Tell the healthcare provider if you have ever had an allergic reaction to contrast liquid  · Your healthcare provider may place the needle into your neck area, middle of your back, or tailbone area  He may inject the medicine next to the nerves that are causing your pain  He may instead inject the medicine into a larger area of the epidural space  This helps the medicine spread to more nerves  Your healthcare provider will use a fluoroscope to help guide the needle to the right place  A fluoroscope is a type of x-ray  After the procedure, a bandage will be placed over the injection site to prevent infection  What will happen after an PARKER? You will have a bandage over the injection site to prevent infection   Your healthcare provider will tell you when you can bathe and any activity guidelines  You will be able to go home  What are the risks of an PARKER? You may have temporary or permanent nerve damage or paralysis  You may have bleeding or develop a serious infection, such as meningitis (swelling of the brain coverings)  An abscess may also develop  An abscess is a pus-filled area under the skin  You may need surgery to fix the abscess  You may have a seizure, anxiety, or trouble sleeping  If you are a man, you may have temporary erectile dysfunction (not able to have an erection)  CARE AGREEMENT:   You have the right to help plan your care  Learn about your health condition and how it may be treated  Discuss treatment options with your healthcare providers to decide what care you want to receive  You always have the right to refuse treatment  The above information is an  only  It is not intended as medical advice for individual conditions or treatments  Talk to your doctor, nurse or pharmacist before following any medical regimen to see if it is safe and effective for you  © Copyright [employer] Counts include 234 beds at the [facility] 2021 Information is for End User's use only and may not be sold, redistributed or otherwise used for commercial purposes   All illustrations and images included in CareNotes® are the copyrighted property of [employer]  or [address] [place] [address] [employer]

## 2021-09-02 NOTE — PROGRESS NOTES
Assessment  1  Spinal stenosis of lumbar region, unspecified whether neurogenic claudication present    2  Lumbar spondylosis    3  DDD (degenerative disc disease), lumbar        Plan  25-year-old male with a history of CAD, atrial fibrillation on chronic anticoagulation, history of AVR, and CKD, referred by Dr Kye Dunham, presenting for initial consultation regarding lumbosacral back pain without any radiation into his lower extremities  The patient has been dealing with this pain for quite some time with a recent flare occurring about 5 months ago without any provoking event  CT of the lumbar spine reveals multilevel degenerative disc disease and spondylosis with varying degrees of central and foraminal stenosis most pronounced at L4-5  The patient does take Tylenol and tramadol p r n  with minimal to mild relief  Physical therapy has not provided any relief in the past   He was unable to tolerate membrane stabilizers in the past secondary to side effects  He has been treated by a pain specialist in [place] where he lives half of the year and lives in [place] the other half of the year  He has been treated with L4 transforaminal epidural steroid injections for the same pain with excellent relief that lasts anywhere between months to years at a time  Patient's low back pain seems to be multifactorial including myofascial and facet mediated components  No evidence of sacroiliitis  There also does appear to be a neuropathic component stemming from stenosis and he does have weakness of the left quadriceps noted on exam   Normal patellar reflexes and hypoactive Achilles reflexes bilaterally  1  I will schedule the patient for bilateral L4 TFESI and permission to hold anticoagulation will be sent to cardiologist in preparation for procedure  2  Patient will continue with tramadol and Tylenol p r n   3  Patient will continue his home exercise program which mainly consists of walking  4   I will follow up the patient in 6 weeks       Complete risks and benefits including bleeding, infection, tissue reaction, nerve injury and allergic reaction were discussed  The approach was demonstrated using models and literature was provided  Verbal and written consent was obtained  My impressions and treatment recommendations were discussed in detail with the patient who verbalized understanding and had no further questions  Discharge instructions were provided  I personally saw and examined the patient and I agree with the above discussed plan of care  No orders of the defined types were placed in this encounter  No orders of the defined types were placed in this encounter  History of Present Illness    Falguni Del Cid is a [de-identified] y o  male with a history of CAD, atrial fibrillation on chronic anticoagulation, history of AVR, and CKD, referred by Dr Deacon Vaz, presenting for initial consultation regarding lumbosacral back pain without any radiation into his lower extremities  He denies any numbness, paresthesias, or weakness in the legs  He denies any bladder or bowel incontinence or saddle anesthesia  The patient has been dealing with this pain for quite some time with a recent flare occurring about 5 months ago without any provoking event  CT of the lumbar spine reveals multilevel degenerative disc disease and spondylosis with varying degrees of central and foraminal stenosis most pronounced at L4-5  The patient does take Tylenol and tramadol p r n  with minimal to mild relief  Physical therapy has not provided any relief in the past   He was unable to tolerate membrane stabilizers in the past secondary to side effects  He has been treated by a pain specialist in [place] where he lives half of the year and lives in [place] the other half of the year    He has been treated with L4 transforaminal epidural steroid injections for the same pain with excellent relief that lasts anywhere between months to years at a time  The patient rates his pain a 10/10 on the pain is nearly constant  The pain is not follow any particular pattern throughout the day  The pain is described as sharp  The pain is decreased with lying down relaxation  The pain is increased with standing, bending, walking, exercise, coughing, and sneezing  He does find some relief with a 10s unit and heat and ice application  Other than as stated above, the patient denies any interval changes in medications, medical condition, mental condition, symptoms, or allergies since the last office visit  I have personally reviewed and/or updated the patient's past medical history, past surgical history, family history, social history, current medications, allergies, and vital signs today  Review of Systems   Constitutional: Positive for unexpected weight change  Negative for fever  HENT: Positive for hearing loss  Negative for trouble swallowing  Eyes: Negative for visual disturbance  Respiratory: Negative for shortness of breath and wheezing  Cardiovascular: Negative for chest pain and palpitations  Gastrointestinal: Negative for constipation, diarrhea, nausea and vomiting  Endocrine: Negative for cold intolerance, heat intolerance and polydipsia  Genitourinary: Negative for difficulty urinating and frequency  Musculoskeletal: Negative for arthralgias, gait problem, joint swelling and myalgias  Skin: Negative for rash  Neurological: Negative for dizziness, seizures, syncope, weakness and headaches  Hematological: Does not bruise/bleed easily  Psychiatric/Behavioral: Negative for dysphoric mood  All other systems reviewed and are negative        Patient Active Problem List   Diagnosis    Paroxysmal atrial fibrillation (HCC)    Coronary artery disease involving native coronary artery of native heart without angina pectoris    Essential hypertension    S/P aortic valve replacement with bioprosthetic valve    Benign prostatic hyperplasia without lower urinary tract symptoms    Gastroesophageal reflux disease without esophagitis    Stage III chronic kidney disease (HCC)    Anticoagulation adequate    Balance disorder    Lumbar pain       Past Medical History:   Diagnosis Date    Atrial fibrillation (HCC)     BPH (benign prostatic hyperplasia)     CHF (congestive heart failure) (HCC)     GERD (gastroesophageal reflux disease)     Skin cancer     Sleep apnea        Past Surgical History:   Procedure Laterality Date    CARDIAC SURGERY      CHOLECYSTECTOMY      CORONARY ARTERY BYPASS GRAFT      CORONARY STENT PLACEMENT      TONSILLECTOMY      VALVE REPLACEMENT         Family History   Problem Relation Age of Onset    Cancer Mother     Dementia Father        Social History     Occupational History    Not on file   Tobacco Use    Smoking status: Never Smoker    Smokeless tobacco: Never Used   Vaping Use    Vaping Use: Never used   Substance and Sexual Activity    Alcohol use:  Yes    Drug use: No    Sexual activity: Not on file       Current Outpatient Medications on File Prior to Visit   Medication Sig    apixaban (ELIQUIS) 5 mg Take 1 tablet (5 mg total) by mouth 2 (two) times a day    atorvastatin (LIPITOR) 40 mg tablet Take 1 tablet (40 mg total) by mouth daily    famotidine (PEPCID) 20 mg tablet Take 1 tablet (20 mg total) by mouth 2 (two) times a day    folic acid (FOLVITE) 1 mg tablet Take 1 tablet (1 mg total) by mouth daily    furosemide (LASIX) 40 mg tablet Take 1 tablet (40 mg total) by mouth 2 (two) times a day    methocarbamol (ROBAXIN) 500 mg tablet Take 1 tablet (500 mg total) by mouth 3 (three) times a day as needed for muscle spasms    metoprolol tartrate (LOPRESSOR) 25 mg tablet Take 0 5 tablets (12 5 mg total) by mouth every 12 (twelve) hours    potassium chloride (Klor-Con 10) 10 mEq tablet Take 1 tablet (10 mEq total) by mouth 2 (two) times a day    pyridoxine (B-6) 100 MG tablet Take 120 mg by mouth    traMADol (ULTRAM) 50 mg tablet Take 1 tablet (50 mg total) by mouth every 6 (six) hours as needed for moderate pain     No current facility-administered medications on file prior to visit  No Known Allergies    Physical Exam    /71   Pulse 57   Ht 5' 10" (1 778 m)   Wt 94 8 kg (209 lb)   BMI 29 99 kg/m²     Constitutional: normal, well developed, well nourished, alert, in no distress and non-toxic and no overt pain behavior  Eyes: anicteric  HEENT: grossly intact  Neck: supple, symmetric, trachea midline and no masses   Pulmonary:even and unlabored  Cardiovascular:No edema or pitting edema present  Skin:Normal without rashes or lesions and well hydrated  Psychiatric:Mood and affect appropriate  Neurologic:Cranial Nerves II-XII grossly intact  Musculoskeletal:antalgicGait and ambulates with a cane  Bilateral lumbar paraspinals tender to palpation ropy in texture from L4-L5  Bilateral SI joints and trochanteric flares nontender to palpation  Bilateral patellar reflexes 2/4  Bilateral Achilles reflexes 1/4  No clonus was noted bilaterally  Bilateral lower extremity strength 5/5 in all muscle groups with the exception of left quadriceps which is 4-5  Sensation intact to light touch in L3 through S1 dermatomes bilaterally  Negative seated straight leg raise bilaterally  Imaging      PACS Images     Show images for CT lumbar spine without contrast   Study Result    Narrative & Impression   CT LUMBAR SPINE     INDICATION:   Back pain and trauma      COMPARISON: None      TECHNIQUE:  Contiguous axial images through the lumbar spine were obtained  Sagittal and coronal reconstructions were performed        Radiation dose length product (DLP) for this visit:  642 25 mGy-cm     This examination, like all CT scans performed in the [facility], was performed utilizing techniques to minimize radiation dose exposure, including the use of iterative   reconstruction and automated exposure control        IMAGE QUALITY:  Diagnostic      FINDINGS:     ALIGNMENT:  There are 5 lumbar type vertebral bodies  Mild L1 retrolisthesis of secondary to chronic disc and facet degenerative change      VERTEBRAL BODIES:  No evidence of fracture, lytic or blastic lesion      DEGENERATIVE CHANGES:     Lower Thoracic spine: Disc degenerative change at T12 1 without significant bony central canal stenosis and neural foraminal narrowing      L1-2:  Posterior disc bulge eccentric to the lateral zones and posterior disc osteophytes  Facet osteophytosis  Mild central canal stenosis  Severe right and mild left neural foraminal narrowing      L2-3:  Posterior disc bulge and facet arthropathy  Mild central canal stenosis  Mild to moderate right and mild left neural foraminal narrowing      L3-4:  Mild posterior disc bulge and facet arthropathy  Mild central canal stenosis  Mild to moderate bilateral neural foraminal narrowing      L4-5:  Posterior disc bulge and disc osteophytes  Facet osteophytosis  Moderate to severe central canal stenosis  Mild bilateral neural foraminal narrowing  Probable encroachment of the traversing L5 nerve roots in the subarticular zones      L5-S1:  Posterior disc bulge  Possible superimposed right paracentral to foraminal zone disc protrusion  Facet osteophytosis  Encroachment of the traversing right S1 nerve root in the subarticular zone    Moderate to severe right and moderate left   neural foraminal narrowing      PARASPINAL SOFT TISSUES:  Paraspinal muscle atrophy in the lumbosacral region      IMPRESSION:     Chronic disc and facet degenerative change resulting in multilevel nerve root encroachment         Workstation performed: [id number]

## 2021-09-02 NOTE — TELEPHONE ENCOUNTER
FAXED Elbert Yanes TO DR Michel Mesa @ [phone] FOR PATIENT TO BE SCHEDULED FOR B/L L4 TFESI     DR NEWMAN WILL DOUBLE BOOK TO GET PATIENT IN THE WEEK OF 9/6 --WAITING FOR HOLD REQUEST     PATIENT REQUEST CALL @ [phone]

## 2021-09-02 NOTE — TELEPHONE ENCOUNTER
Pt needs to have neuraxial injection  [facility] would like a 3 day hold of Eliquis prior to procedure        Please advise

## 2021-09-03 ENCOUNTER — OFFICE VISIT (OUTPATIENT)
Dept: PHYSICAL THERAPY | Facility: CLINIC | Age: 81
End: 2021-09-03
Payer: MEDICARE

## 2021-09-03 DIAGNOSIS — M51.36 DDD (DEGENERATIVE DISC DISEASE), LUMBAR: ICD-10-CM

## 2021-09-03 DIAGNOSIS — M54.50 ACUTE MIDLINE LOW BACK PAIN WITHOUT SCIATICA: Primary | ICD-10-CM

## 2021-09-03 PROCEDURE — 97014 ELECTRIC STIMULATION THERAPY: CPT | Performed by: PHYSICAL THERAPIST

## 2021-09-03 PROCEDURE — 97140 MANUAL THERAPY 1/> REGIONS: CPT | Performed by: PHYSICAL THERAPIST

## 2021-09-03 NOTE — PROGRESS NOTES
Daily Note     Today's date: 9/3/2021  Patient name: Jas Blackmon  : 1940  MRN: [id number]  Referring provider: Joao Uriostegui MD  Dx:   Encounter Diagnosis     ICD-10-CM    1  Acute midline low back pain without sciatica  M54 5    2  DDD (degenerative disc disease), lumbar  M51 36                   Subjective:  Pt reports getting some pain relief from PT and TENS  Objective:  See treatment diary below  Assessment:  Pt presented to outpatient physical therapy at [place] [address] with complaints of severe LBP  He is having the most difficulty when walking, rolling in bed and rising from sitting due to pain  Patient was referred to PT through [place] [id number]  He will continue to benefit from skilled PT services with focus on manual treatment to address his hypomobility issues, stretching and core strengthening in order to return to all normal activities and reach maximum level of function  Pt is still very guarded in movement, but ambulating faster each session with SPC vs walker  He will do well with an PARKER to lessen pain to allow for more involved PT as well as with home TENS unit  He is becoming more functional with PT despite still having moderate pain most of the time  Plan:  Lumbar PARKER scheduled on 21    Add more exercises post PARKER - TB Rows B           POC EXPIRES On:  9/15/21  PRECAUTIONS:  None  CO-MORBIDITES:  A-fib, CHF  PERSONAL FACTORS:  None      Manuals HEP 8/16 8/20 8/25 8/27 8/30 9/3      STM B lumbar paraspinals in sitting  8' 12' 15' 15' 12' 12'                                             Neuro Re-Ed     Supine PPT  5" 10                                                                                         Ther Ex    Supine LTR B  5" 5 ea  5" 5 ea         Seated H/S stretch L/R  10" 5 ea  10" 5 ea 10" 5 ea 10" 5       Seated trunk rotation L/R  10" 5 ea  10" 5 ea 10" 5 ea 10" 5 Ther Activity    Bed mobility training  5'                        Gait Training                              Modalities    Pre-mod stim sitting B LB   20' 20' 24' 23' 23'

## 2021-09-07 ENCOUNTER — HOSPITAL ENCOUNTER (OUTPATIENT)
Dept: RADIOLOGY | Facility: CLINIC | Age: 81
Discharge: HOME | End: 2021-09-07
Attending: ANESTHESIOLOGY | Admitting: ANESTHESIOLOGY
Payer: MEDICARE

## 2021-09-07 VITALS
HEART RATE: 61 BPM | OXYGEN SATURATION: 95 % | TEMPERATURE: 98.9 F | RESPIRATION RATE: 20 BRPM | DIASTOLIC BLOOD PRESSURE: 68 MMHG | SYSTOLIC BLOOD PRESSURE: 163 MMHG

## 2021-09-07 DIAGNOSIS — M48.061 SPINAL STENOSIS OF LUMBAR REGION, UNSPECIFIED WHETHER NEUROGENIC CLAUDICATION PRESENT: ICD-10-CM

## 2021-09-07 PROCEDURE — 64483 NJX AA&/STRD TFRM EPI L/S 1: CPT | Performed by: ANESTHESIOLOGY

## 2021-09-07 RX ORDER — PAPAVERINE HCL 150 MG
20 CAPSULE, EXTENDED RELEASE ORAL ONCE
Status: COMPLETED | OUTPATIENT
Start: 2021-09-07 | End: 2021-09-07

## 2021-09-07 RX ADMIN — DEXAMETHASONE SODIUM PHOSPHATE 15 MG: 10 INJECTION, SOLUTION INTRAMUSCULAR; INTRAVENOUS at 10:33

## 2021-09-07 RX ADMIN — LIDOCAINE HYDROCHLORIDE 2 ML: 20 INJECTION, SOLUTION EPIDURAL; INFILTRATION; INTRACAUDAL; PERINEURAL at 10:28

## 2021-09-07 RX ADMIN — IOHEXOL 2 ML: 300 INJECTION, SOLUTION INTRAVENOUS at 10:32

## 2021-09-07 NOTE — DISCHARGE INSTRUCTIONS
Epidural Steroid Injection   WHAT YOU NEED TO KNOW:   An epidural steroid injection (PARKER) is a procedure to inject steroid medicine into the epidural space  The epidural space is between your spinal cord and vertebrae  Steroids reduce inflammation and fluid buildup in your spine that may be causing pain  You may be given pain medicine along with the steroids  ACTIVITY  · Do not drive or operate machinery today  · No strenuous activity today - bending, lifting, etc   · You may resume normal activites starting tomorrow - start slowly and as tolerated  · You may shower today, but no tub baths or hot tubs  · You may have numbness for several hours from the local anesthetic  Please use caution and common sense, especially with weight-bearing activities  CARE OF THE INJECTION SITE  · If you have soreness or pain, apply ice to the area today (20 minutes on/20 minutes off)  · Starting tomorrow, you may use warm, moist heat or ice if needed  · You may have an increase or change in your discomfort for 36-48 hours after your treatment  · Apply ice and continue with any pain medication you have been prescribed  · [facility]ify [facility] if you have any of the following: redness, drainage, swelling, headache, stiff neck or fever above 100°F     SPECIAL INSTRUCTIONS  · Our office will contact you in approximately 7 days for a progress report  MEDICATIONS  · Continue to take all routine medications  · Our office may have instructed you to hold some medications  As no general anesthesia was used in today's procedure, you should not experience any side effects related to anesthesia  If you have a problem specifically related to your procedure, please call our office at [phone]  Problems not related to your procedure should be directed to your primary care physician

## 2021-09-07 NOTE — H&P
History of Present Illness: The patient is a [de-identified] y o  male who presents with complaints of   Low back and leg pain      Patient Active Problem List   Diagnosis    Paroxysmal atrial fibrillation (HCC)    Coronary artery disease involving native coronary artery of native heart without angina pectoris    Essential hypertension    S/P aortic valve replacement with bioprosthetic valve    Benign prostatic hyperplasia without lower urinary tract symptoms    Gastroesophageal reflux disease without esophagitis    Stage III chronic kidney disease ([facility] [address] )    Anticoagulation adequate    Balance disorder    Lumbar pain    Spinal stenosis of lumbar region    Lumbar spondylosis    DDD (degenerative disc disease), lumbar       Past Medical History:   Diagnosis Date    Atrial fibrillation ([facility] [address] )     BPH (benign prostatic hyperplasia)     CHF (congestive heart failure) (HCC)     GERD (gastroesophageal reflux disease)     Skin cancer     Sleep apnea        Past Surgical History:   Procedure Laterality Date    CARDIAC SURGERY      CHOLECYSTECTOMY      CORONARY ARTERY BYPASS GRAFT      CORONARY STENT PLACEMENT      TONSILLECTOMY      VALVE REPLACEMENT           Current Outpatient Medications:     apixaban (ELIQUIS) 5 mg, Take 1 tablet (5 mg total) by mouth 2 (two) times a day, Disp: 60 tablet, Rfl: 5    atorvastatin (LIPITOR) 40 mg tablet, Take 1 tablet (40 mg total) by mouth daily, Disp: 90 tablet, Rfl: 3    famotidine (PEPCID) 20 mg tablet, Take 1 tablet (20 mg total) by mouth 2 (two) times a day, Disp: 180 tablet, Rfl: 3    folic acid (FOLVITE) 1 mg tablet, Take 1 tablet (1 mg total) by mouth daily, Disp: 30 tablet, Rfl: 5    furosemide (LASIX) 40 mg tablet, Take 1 tablet (40 mg total) by mouth 2 (two) times a day, Disp: 60 tablet, Rfl: 5    methocarbamol (ROBAXIN) 500 mg tablet, Take 1 tablet (500 mg total) by mouth 3 (three) times a day as needed for muscle spasms, Disp: 50 tablet, Rfl: 2    metoprolol tartrate (LOPRESSOR) 25 mg tablet, Take 0 5 tablets (12 5 mg total) by mouth every 12 (twelve) hours, Disp: 30 tablet, Rfl: 11    potassium chloride (Klor-Con 10) 10 mEq tablet, Take 1 tablet (10 mEq total) by mouth 2 (two) times a day, Disp: 180 tablet, Rfl: 3    pyridoxine (B-6) 100 MG tablet, Take 120 mg by mouth, Disp: , Rfl:     traMADol (ULTRAM) 50 mg tablet, Take 1 tablet (50 mg total) by mouth every 6 (six) hours as needed for moderate pain, Disp: 40 tablet, Rfl: 1    No Known Allergies    Physical Exam:   Vitals:    09/07/21 1006   BP: 138/57   Pulse: 60   Resp: 22   Temp: 98 9 °F (37 2 °C)   SpO2: 97%     General: Awake, Alert, Oriented x 3, Mood and affect appropriate  Respiratory: Respirations even and unlabored  Cardiovascular: Peripheral pulses intact; no edema  Musculoskeletal Exam:   Bilateral lumbar paraspinals tender to palpation  ASA Score: 3         Assessment:   1   Spinal stenosis of lumbar region, unspecified whether neurogenic claudication present        Plan: Bilateral L4 TFESI

## 2021-09-14 ENCOUNTER — TELEPHONE (OUTPATIENT)
Dept: PAIN MEDICINE | Facility: CLINIC | Age: 81
End: 2021-09-14

## 2021-09-15 ENCOUNTER — EVALUATION (OUTPATIENT)
Dept: PHYSICAL THERAPY | Facility: CLINIC | Age: 81
End: 2021-09-15
Payer: MEDICARE

## 2021-09-15 DIAGNOSIS — M51.36 DDD (DEGENERATIVE DISC DISEASE), LUMBAR: ICD-10-CM

## 2021-09-15 DIAGNOSIS — M54.50 ACUTE MIDLINE LOW BACK PAIN WITHOUT SCIATICA: Primary | ICD-10-CM

## 2021-09-15 PROCEDURE — 97014 ELECTRIC STIMULATION THERAPY: CPT | Performed by: PHYSICAL THERAPIST

## 2021-09-15 PROCEDURE — 97140 MANUAL THERAPY 1/> REGIONS: CPT | Performed by: PHYSICAL THERAPIST

## 2021-09-15 NOTE — PROGRESS NOTES
PT Re-evaluation + D/C    Today's date: 9/15/2021  Patient name: Maggie Horne  : 1940  MRN: [id number]  Referring provider: Denita Fierro MD  Dx:   Encounter Diagnosis     ICD-10-CM    1  Acute midline low back pain without sciatica  M54 5    2  DDD (degenerative disc disease), lumbar  M51 36                   Assessment  Assessment details: Maggie Horne is a [de-identified] y o  male who presented to outpatient physical therapy at [place] [address] with complaints of severe LBP  He was having the most difficulty when walking, rolling in bed and rising from sitting due to pain  Patient was referred to PT through [place] [id number]  He has progressed well in PT with lessening pain, but is still limited functionally with heavy tasks and long walking  He can perform all basic ADLs independently now  He will continue to benefit from skilled PT services with focus on manual treatment to address his hypomobility issues, stretching and core strengthening in order to return to all normal activities and reach maximum level of function when he arrives in [place] in 2 weeks  Barriers to therapy: None  Understanding of Dx/Px/POC: excellent   Prognosis: good    Goals  ST  Independent with HEP in 2 weeks - Met  2  Increase lumbar AROM to WNL all motions in 3 weeks - Partially Met  3  Good body mechanics and bed mobility in 2 weeks - Met    LT  Achieve FOTO score of 50/100 in 4 weeks - Partially Met  2  Able to complete all ADLs for housework  in 4 weeks - Partially Met  3  Strength abdominals = 4/5 in 4 weeks - Partially Met      Plan  Treatment plan discussed with: patient and family        Subjective Evaluation    History of Present Illness  Mechanism of injury: Patient reports having severe LBP after weed whacking on 21  Pain was so bad that he went to the ER on 21  He has had some radiation into the buttocks but not below inguinal area    He also has hx of B plantar fasciitis, achilles tendonitis and tarsal tunnel syndrome  Has had PT for ataxia recently with dizziness getting OOB  He has hx of LBP, but not to this extent  Pt has A-fib and CHF  Getting some relief with PT and home TENS  Still unable to do most housework  Unable to sleep in any position aside from on his back with feet elevated  Can not roll in bed  Has had some pain relief from lumbar injections in the past   Had an PARKER on 21 with 80% pain relief on R and 20% on L  Recurrent probem    Quality of life: fair    Pain  Current pain ratin  At best pain rating: 3  At worst pain ratin  Quality: tight  Relieving factors: heat and rest  Progression: improved    Social Support  Steps to enter house: yes  Stairs in house: yes   Lives in: multiple-level home  Lives with: spouse    Employment status: not working    Diagnostic Tests  CT scan: abnormal (Chronic disc and facet degenerative change resulting in multilevel nerve root encroachment     Worse R>L with mod-severe foraminal stenosis )  Treatments  Previous treatment: physical therapy and medication  Current treatment: medication and physical therapy        Objective     Concurrent Complaints  Positive for cardiac problem  Negative for night pain, disturbed sleep, bladder dysfunction and bowel dysfunction    Static Posture     Lumbar Spine   Flattened  Postural Observations  Seated posture: fair  Standing posture: fair  Correction of posture: makes symptoms better        Palpation   Left   No palpable tenderness to the erector spinae  Right   No palpable tenderness to the erector spinae  Hypertonic in the erector spinae  Tenderness     Lumbar Spine  Tenderness in the spinous process (L3-L5)       Neurological Testing     Sensation     Lumbar   Left   Intact: light touch    Right   Intact: light touch    Active Range of Motion     Lumbar   Flexion:  with pain Restriction level: moderate  Extension:  with pain Restriction level: minimal  Left lateral flexion:  with pain Restriction level: moderate  Right lateral flexion:  with pain Restriction level: moderate  Left rotation:  with pain Restriction level: moderate  Right rotation:  with pain Restriction level: moderate    Strength/Myotome Testing     Left Hip   Planes of Motion   Flexion: 4  Abduction: 4  Adduction: 5    Right Hip   Planes of Motion   Flexion: 4  Abduction: 4  Adduction: 5    Left Knee   Flexion: 4+  Extension: 4+    Right Knee   Flexion: 4+  Extension: 4+    Left Ankle/Foot   Dorsiflexion: 5    Right Ankle/Foot   Dorsiflexion: 5    Additional Strength Details  Abdominals = 3/5  Tests     Lumbar   Negative prone instability   Ambulation     Observational Gait   Decreased walking speed and stride length  Additional Observational Gait Details  Fair trunk rotation B  General Comments:      Lumbar Comments  Min disuse atrophy starting in B LEs since onset          POC EXPIRES On:  9/15/21  PRECAUTIONS:  None  CO-MORBIDITES:  A-fib, CHF  PERSONAL FACTORS:  None      Manuals HEP 8/16 8/20 8/25 8/27 8/30 9/3 9/15     STM B lumbar paraspinals in sitting  8' 12' 15' 15' 12' 12' 12'                                            Neuro Re-Ed     Supine PPT 8/16 5" 10                                                                                         Ther Ex    Supine LTR B 8/16 5" 5 ea  5" 5 ea         Seated H/S stretch L/R 8/16 10" 5 ea  10" 5 ea 10" 5 ea 10" 5       Seated trunk rotation L/R 8/16 10" 5 ea  10" 5 ea 10" 5 ea 10" 5                                                                        Ther Activity    Bed mobility training  5'                        Gait Training                              Modalities    Pre-mod stim sitting B LB   20' 20' 24' 23' 23' 23'

## 2021-09-20 ENCOUNTER — OFFICE VISIT (OUTPATIENT)
Dept: CARDIOLOGY CLINIC | Facility: CLINIC | Age: 81
End: 2021-09-20
Payer: MEDICARE

## 2021-09-20 VITALS
BODY MASS INDEX: 24.08 KG/M2 | HEART RATE: 120 BPM | HEIGHT: 70 IN | WEIGHT: 168.2 LBS | DIASTOLIC BLOOD PRESSURE: 70 MMHG | SYSTOLIC BLOOD PRESSURE: 118 MMHG

## 2021-09-20 DIAGNOSIS — Z95.3 S/P AORTIC VALVE REPLACEMENT WITH BIOPROSTHETIC VALVE: ICD-10-CM

## 2021-09-20 DIAGNOSIS — I48.0 PAROXYSMAL ATRIAL FIBRILLATION (HCC): ICD-10-CM

## 2021-09-20 DIAGNOSIS — I25.10 CORONARY ARTERY DISEASE INVOLVING NATIVE CORONARY ARTERY OF NATIVE HEART WITHOUT ANGINA PECTORIS: ICD-10-CM

## 2021-09-20 DIAGNOSIS — I10 ESSENTIAL HYPERTENSION: Primary | ICD-10-CM

## 2021-09-20 PROCEDURE — 99214 OFFICE O/P EST MOD 30 MIN: CPT | Performed by: INTERNAL MEDICINE

## 2021-09-20 RX ORDER — AMIODARONE HYDROCHLORIDE 200 MG/1
200 TABLET ORAL DAILY
Qty: 90 TABLET | Refills: 3 | Status: SHIPPED | OUTPATIENT
Start: 2021-09-20 | End: 2022-07-11

## 2021-09-20 RX ORDER — WARFARIN SODIUM 2.5 MG/1
TABLET ORAL
Qty: 90 TABLET | Refills: 3 | Status: SHIPPED | OUTPATIENT
Start: 2021-09-20

## 2021-09-20 NOTE — PROGRESS NOTES
Cardiology   Thiago Manley [de-identified] y o  male MRN: [id number]        Impression:  1  PAF s/p ablation 2018 - currently in atrial fibrillation  Was in NSR when presented for ANDREY cardioversion 7/27/21  Given dyspnea, concerned about afib contributing to dyspnea  2  CAD s/p remote PCI - stable  3  S/P bioAVR  4  HTN - controlled  5  Back pain - not controlled       Recommendations:  1  Start amiodarone 200mg daily  2  Discontinue Eliquis  3  Restart Warfarin - followed by Cardiologist in [place]  4  Continue remainder of medications  5  Follow up in 8 months  Will follow up with [place] cardiologist in next several weeks  HPI: Thiago Manley is a [de-identified]y o  year old male with paroxysmal atrial fibrillation s/p ablation 2018, s/p bio AVR, CAD, hypertension, and CKD, who presents for follow up  Was in atrial fibrillation, but when arrived for ANDREY/cardioversion, was back in NSR  Currently, in atrial fibrillation with RVR  Major complaint is back pain  No chest pain, but does have dyspnea  Patient going to [place] next week returns May 2022  Review of Systems   Constitutional: Negative  HENT: Negative  Eyes: Negative  Respiratory: Positive for shortness of breath  Negative for chest tightness  Cardiovascular: Negative for chest pain, palpitations and leg swelling  Gastrointestinal: Negative  Endocrine: Negative  Genitourinary: Negative  Musculoskeletal: Positive for back pain  Skin: Negative  Allergic/Immunologic: Negative  Neurological: Negative  Hematological: Negative  Psychiatric/Behavioral: Negative  All other systems reviewed and are negative          Past Medical History:   Diagnosis Date    Atrial fibrillation (HCC)     BPH (benign prostatic hyperplasia)     CHF (congestive heart failure) (HCC)     GERD (gastroesophageal reflux disease)     Skin cancer     Sleep apnea      Past Surgical History:   Procedure Laterality Date    CARDIAC SURGERY  CHOLECYSTECTOMY      CORONARY ARTERY BYPASS GRAFT      CORONARY STENT PLACEMENT      TONSILLECTOMY      VALVE REPLACEMENT       Social History     Substance and Sexual Activity   Alcohol Use Yes     Social History     Substance and Sexual Activity   Drug Use No     Social History     Tobacco Use   Smoking Status Never Smoker   Smokeless Tobacco Never Used     Family History   Problem Relation Age of Onset    Cancer Mother     Dementia Father        Allergies:  No Known Allergies    Medications:     Current Outpatient Medications:     apixaban (ELIQUIS) 5 mg, Take 1 tablet (5 mg total) by mouth 2 (two) times a day, Disp: 60 tablet, Rfl: 5    atorvastatin (LIPITOR) 40 mg tablet, Take 1 tablet (40 mg total) by mouth daily, Disp: 90 tablet, Rfl: 3    famotidine (PEPCID) 20 mg tablet, Take 1 tablet (20 mg total) by mouth 2 (two) times a day, Disp: 180 tablet, Rfl: 3    folic acid (FOLVITE) 1 mg tablet, Take 1 tablet (1 mg total) by mouth daily, Disp: 30 tablet, Rfl: 5    furosemide (LASIX) 40 mg tablet, Take 1 tablet (40 mg total) by mouth 2 (two) times a day, Disp: 60 tablet, Rfl: 5    methocarbamol (ROBAXIN) 500 mg tablet, Take 1 tablet (500 mg total) by mouth 3 (three) times a day as needed for muscle spasms, Disp: 50 tablet, Rfl: 2    metoprolol tartrate (LOPRESSOR) 25 mg tablet, Take 0 5 tablets (12 5 mg total) by mouth every 12 (twelve) hours, Disp: 30 tablet, Rfl: 11    potassium chloride (Klor-Con 10) 10 mEq tablet, Take 1 tablet (10 mEq total) by mouth 2 (two) times a day, Disp: 180 tablet, Rfl: 3    pyridoxine (B-6) 100 MG tablet, Take 120 mg by mouth, Disp: , Rfl:     traMADol (ULTRAM) 50 mg tablet, Take 1 tablet (50 mg total) by mouth every 6 (six) hours as needed for moderate pain, Disp: 40 tablet, Rfl: 1      Wt Readings from Last 3 Encounters:   09/20/21 76 3 kg (168 lb 3 2 oz)   09/02/21 94 8 kg (209 lb)   09/01/21 95 7 kg (211 lb)     Temp Readings from Last 3 Encounters:   09/07/21 98 9 °F (37 2 °C) (Temporal)   08/05/21 98 4 °F (36 9 °C) (Temporal)   07/27/21 98 1 °F (36 7 °C) (Oral)     BP Readings from Last 3 Encounters:   09/20/21 118/70   09/07/21 163/68   09/02/21 151/71     Pulse Readings from Last 3 Encounters:   09/20/21 (!) 120   09/07/21 61   09/02/21 57         Physical Exam  HENT:      Head: Atraumatic  Mouth/Throat:      Mouth: Mucous membranes are dry  Eyes:      Extraocular Movements: Extraocular movements intact  Cardiovascular:      Rate and Rhythm: Tachycardia present  Rhythm irregular  Heart sounds: Normal heart sounds  Pulmonary:      Effort: Pulmonary effort is normal       Breath sounds: Normal breath sounds  Abdominal:      General: Abdomen is flat  Musculoskeletal:         General: Normal range of motion  Skin:     General: Skin is warm and dry  Neurological:      General: No focal deficit present  Mental Status: He is alert and oriented to person, place, and time     Psychiatric:         Mood and Affect: Mood normal          Behavior: Behavior normal            Laboratory Studies:  CMP:  Lab Results   Component Value Date    K 3 8 06/24/2021     06/24/2021    CO2 31 06/24/2021    BUN 14 06/24/2021    CREATININE 1 13 06/24/2021    AST 48 (H) 06/24/2021    ALT 43 06/24/2021    EGFR 61 06/24/2021       Lipid Profile:   No results found for: CHOL  Lab Results   Component Value Date    HDL 97 06/24/2021     Lab Results   Component Value Date    LDLCALC 57 06/24/2021     Lab Results   Component Value Date    TRIG 66 06/24/2021       Cardiac testing:   EKG reviewed personally: Afib 120  [id number]  Results for orders placed during the hospital encounter of 06/21/18    Echo complete with contrast if indicated    Narrative  [address]  [facility] [address] [facility]  [phone]    Transthoracic Echocardiogram  2D, M-mode, Doppler, and Color Doppler    Study date:  22-Jun-2018    Patient: Yuli Peterson [address] [place]  MR number: [id number]  Account number: [de-identified]  : 1940  Age: 69 years  Gender: Male  Status: Inpatient  Location: Bedside  Height: 70 in  Weight: 220 4 lb  BP: 122/ 81 mmHg    Indications: Atrial fibrillation  Diagnoses: I48 0 - Atrial fibrillation    Sonographer:  ARELIS Collazo RDCS RVT  Primary Physician:  Ry Mello MD  Referring Physician:  Theresa Gross MD  Group:  [facility]  Interpreting Physician:  Leslie Jaimes MD    SUMMARY    PROCEDURE INFORMATION:  This was a technically difficult study  LEFT VENTRICLE:  Systolic function was at the lower limits of normal  Ejection fraction was estimated to be 50 %  VENTRICULAR SEPTUM:  There was paradoxical motion  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  LEFT ATRIUM:  The atrium was mildly dilated  RIGHT ATRIUM:  The atrium was mildly dilated  MITRAL VALVE:  There was mild regurgitation  AORTIC VALVE:  A bioprosthesis was present  It exhibited normal function  Transaortic velocity was increased due to increased transvalvular flow, possible mismatch  3 1-3 3 m/sec  There was mild regurgitation  The valve was not well visualized  TRICUSPID VALVE:  There was trace regurgitation  PULMONIC VALVE:  There was trace regurgitation  HISTORY: PRIOR HISTORY: Risk factors: hypertension  PRIOR PROCEDURES: Bioprosthesis of aortic valve  Coronary bypass  PROCEDURE: The procedure was performed at the bedside  This was a routine study  The transthoracic approach was used  The study included complete 2D imaging, M-mode, complete spectral Doppler, and color Doppler  Images were obtained from  the parasternal, apical, subcostal, and suprasternal notch caro windows  Echocardiographic views were limited due to decreased penetration and lung interference  This was a technically difficult study      LEFT VENTRICLE: Size was normal  Systolic function was at the lower limits of normal  Ejection fraction was estimated to be 50 %  Wall thickness was normal  No evidence of apical thrombus  DOPPLER: Left ventricular diastolic function  parameters were normal     VENTRICULAR SEPTUM: There was paradoxical motion  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  RIGHT VENTRICLE: The size was normal  Systolic function was normal  Wall thickness was normal     LEFT ATRIUM: The atrium was mildly dilated  RIGHT ATRIUM: The atrium was mildly dilated  MITRAL VALVE: Valve structure was normal  There was mild thickening  There was normal leaflet separation  DOPPLER: The transmitral velocity was within the normal range  There was no evidence for stenosis  There was mild regurgitation  AORTIC VALVE: The valve was trileaflet  Leaflets exhibited normal thickness and normal cuspal separation  A bioprosthesis was present  It exhibited normal function  The valve was not well visualized  DOPPLER: Transaortic velocity was  increased due to increased transvalvular flow, possible mismatch  3 1-3 3 m/sec  There was no evidence for stenosis  There was mild regurgitation  TRICUSPID VALVE: The valve structure was normal  There was normal leaflet separation  DOPPLER: The transtricuspid velocity was within the normal range  There was no evidence for stenosis  There was trace regurgitation  PULMONIC VALVE: Leaflets exhibited normal thickness, no calcification, and normal cuspal separation  DOPPLER: The transpulmonic velocity was within the normal range  There was trace regurgitation  PERICARDIUM: There was no pericardial effusion  The pericardium was normal in appearance  AORTA: The root exhibited normal size  SYSTEMIC VEINS: IVC: The inferior vena cava was normal in size      SYSTEM MEASUREMENT TABLES    2D  %FS: 37 55 %  AV Diam: 3 52 cm  EDV(Teich): 154 92 ml  EF(Teich): 66 95 %  ESV(Teich): 51 21 ml  IVSd: 0 87 cm  LA Area: 27 03 cm2  LA Diam: 4 41 cm  LVEDV MOD A4C: 134 83 ml  LVEF MOD A4C: 69 22 %  LVESV MOD A4C: 41 5 ml  LVIDd: 5 62 cm  LVIDs: 3 51 cm  LVLd A4C: 8 08 cm  LVLs A4C: 6 71 cm  LVOT Diam: 2 13 cm  LVPWd: 0 93 cm  RA Area: 23 41 cm2  RVIDd: 4 65 cm  SV MOD A4C: 93 33 ml  SV(Teich): 103 71 ml    CW  AV Env  Ti: 335 26 ms  AV SI: 320 23 ml/m2  AV SV: 698 11 ml  AV VTI: 71 75 cm  AV Vmax: 3 17 m/s  AV Vmean: 2 14 m/s  AV maxP 32 mmHg  AV meanP 79 mmHg    MM  TAPSE: 1 96 cm    PW  KIKE (VTI): 1 61 cm2  KIKE Vmax: 1 43 cm2  LVOT Env  Ti: 342 95 ms  LVOT VTI: 32 44 cm  LVOT Vmax: 1 28 m/s  LVOT Vmean: 0 95 m/s  LVOT maxP 57 mmHg  LVOT meanPG: 3 96 mmHg  LVSI Dopp: 52 83 ml/m2  LVSV Dopp: 115 17 ml  MV A German: 0 54 m/s  MV Dec [place]: 4 8 m/s2  MV DecT: 262 82 ms  MV E German: 1 26 m/s  MV E/A Ratio: 2 34  MV PHT: 76 22 ms  MVA By PHT: 2 89 cm2    [facility] Accredited Echocardiography Laboratory    Prepared and electronically signed by    Jannie Freeman MD  Signed 2018 15:17:47    Results for orders placed during the hospital encounter of 18    ANDREY    Narrative  [facility] [address]  [facility], [address] [facility]  [phone]    Transesophageal Echocardiogram  2D, Doppler, and Color Doppler    Study date:  24-Aug-2018    Patient: Eliza Mackay  MR number: [id number]  Account number: [de-identified]  : [id number]  Age: 69 years  Gender: Male  Status: Outpatient  Location: Cath lab  Height: 70 in  Weight: 229 lb  BP: 118/ 76 mmHg    Indications: AFIB    Diagnoses: I48 0 - Atrial fibrillation    Sonographer:  MILTON Boyer  Interpreting Physician:  Sasha Ramirez DO  Primary Physician:  Hallie Begum MD  Referring Physician:  Sasha Ramirez DO  Group:  [facility]    SUMMARY    LEFT VENTRICLE:  Systolic function was at the lower limits of normal by visual assessment  Ejection fraction was estimated to be 50 %  LEFT ATRIUM:  The atrium was moderately dilated      AORTIC VALVE:  A bioprosthesis was present  It exhibited stenosis and regurgitation and reduced motion  There was mild thickening of the leaflets  There was mild regurgitation  HISTORY: PRIOR HISTORY: Stent, AFIB, Hypertension, S/P AVR    PROCEDURE: The procedure was performed in the catheterization laboratory  This was a routine study  The risks and alternatives of the procedure were explained to the patient and informed consent was obtained  The transesophageal approach  was used  The study included complete 2D imaging, limited spectral Doppler, and color Doppler  The heart rate was 65 bpm, at the start of the study  An adult omniplane probe was inserted by the attending cardiologist  Intubated with ease  One intubation attempt(s)  There was no blood detected on the probe  Image quality was adequate  There were no complications during the procedure  MEDICATIONS: Anesthesia administered by anesthesia team     LEFT VENTRICLE: Systolic function was at the lower limits of normal by visual assessment  Ejection fraction was estimated to be 50 %  LEFT ATRIUM: The atrium was moderately dilated  APPENDAGE: No thrombus was identified  AORTIC VALVE: A bioprosthesis was present  It exhibited stenosis and regurgitation and reduced motion  There was mild thickening of the leaflets  DOPPLER: There was mild regurgitation  PERICARDIUM: There was no pericardial effusion  MEASUREMENT TABLES    DOPPLER MEASUREMENTS  LVOT   (Reference normals)  Peak german   300 cm/s   (--)  Peak gradient   44 mmHg   (--)  Mean gradient   21 mmHg   (--)    SYSTEM MEASUREMENT TABLES    Unspecified Scan Mode  Mean Grad; Antegrade Flow: 21 mm[Hg]  Mean Grad; Mean value; Antegrade Flow: 21 mm[Hg]  Mean German; Antegrade Flow: 2080 mm/s  Mean German; Mean value; Antegrade Flow: 2080 mm/s  Peak Grad; Mean; Antegrade Flow: 44 mm[Hg]  VTI; Antegrade Flow: 83 3 cm  VTI; Mean; Antegrade Flow: 83 3 cm  Vmax; Antegrade Flow: 3310 mm/s  Vmax; Mean;  Antegrade Flow: 3310 mm/s    [facility] Accredited Echocardiography Laboratory    Prepared and electronically signed by    Criselda Barillas DO  Signed 10-Sep-2018 14:39:42    No results found for this or any previous visit  No results found for this or any previous visit

## 2021-09-20 NOTE — PATIENT INSTRUCTIONS
Recommendations:  1  Start amiodarone 200mg daily  2  Discontinue Eliquis  3  Restart Warfarin - followed by Cardiologist in [place]  4  Continue remainder of medications  5  Follow up in 8 months  Will follow up with [place] cardiologist in next several weeks

## 2021-09-21 NOTE — TELEPHONE ENCOUNTER
The pt said that his pain has never left  He needs help to get out of the bed  Pt would like to get another shot if her can again asap  He is going away next week he will be in [place] for the whole winter       Pain level is a 10+    Please call after 12

## 2021-09-22 NOTE — TELEPHONE ENCOUNTER
S/w pt  Advised of same  Pt states that he has a pain doctor in [place] that he will f/u with when he gets down there  Pt will resume his anticoagulant today  Pt verbalized understanding and appreciation

## 2021-09-22 NOTE — TELEPHONE ENCOUNTER
Considering the patient is an anticoagulation hold, I do not think that we will be able to get his injection done in time prior to him leaving for [place]  Furthermore, the patient is established here in the [facility] office  I do not perform procedures in [facility]    I do not have any further recommendations at this time especially considering I will not be able to follow-up with him after those recommendations are made

## 2021-09-22 NOTE — TELEPHONE ENCOUNTER
RN s/w pt  Pt states that he is having B/L lower back pain rated #10/10 that makes it difficult to move around  Pt is requesting a procedure before he leaves for [place] on 9/27  He will be gone until early May  Pt is requesting that his procedure be done at [facility] as his wife cannot drive far  Pt has not been seen in the [place] office but attempted to, before being seen in consult with JEFF  Pt was taking Eliquis but was switched to Coumadin by his cardiologist on 9/20  Pt has not started Coumadin yet and has held his Eliquis the past 2 says  Pt advised to start his coumadin as he would not be able to get a procedure visit within a week  Pt is taking Tramadol 50 mg q 8hrs  He is taking Methocarbamol 500 mg bid but it is prescribed tid  Advised pt to increase to tid  Advised pt that he can add tylenol 1000 mg every 8 hrs up to a max of 3000mg/24hrs  Pt uses heat which does help a little  Pt would like to know if it would be possible to be squeezed in at [place] for procedure  Advised pt that schedules are booked 2-3 weeks out  Any further recommendations from [address] [place] [address]?

## 2021-10-04 DIAGNOSIS — I48.0 PAROXYSMAL ATRIAL FIBRILLATION (HCC): ICD-10-CM

## 2021-12-08 DIAGNOSIS — E53.8 FOLATE DEFICIENCY: ICD-10-CM

## 2021-12-08 RX ORDER — FOLIC ACID 1 MG/1
1 TABLET ORAL DAILY
Qty: 30 TABLET | Refills: 5 | Status: SHIPPED | OUTPATIENT
Start: 2021-12-08 | End: 2022-05-19

## 2022-05-19 ENCOUNTER — OFFICE VISIT (OUTPATIENT)
Dept: FAMILY MEDICINE CLINIC | Facility: HOSPITAL | Age: 82
End: 2022-05-19
Payer: MEDICARE

## 2022-05-19 ENCOUNTER — HOSPITAL ENCOUNTER (OUTPATIENT)
Dept: RADIOLOGY | Facility: HOSPITAL | Age: 82
Discharge: HOME | End: 2022-05-19
Attending: STUDENT IN AN ORGANIZED HEALTH CARE EDUCATION/TRAINING PROGRAM
Payer: MEDICARE

## 2022-05-19 VITALS
BODY MASS INDEX: 31.21 KG/M2 | HEART RATE: 61 BPM | HEIGHT: 70 IN | SYSTOLIC BLOOD PRESSURE: 140 MMHG | WEIGHT: 218 LBS | OXYGEN SATURATION: 96 % | DIASTOLIC BLOOD PRESSURE: 80 MMHG

## 2022-05-19 DIAGNOSIS — S39.012A: ICD-10-CM

## 2022-05-19 DIAGNOSIS — I48.0 PAROXYSMAL ATRIAL FIBRILLATION (HCC): ICD-10-CM

## 2022-05-19 DIAGNOSIS — M25.511 ACUTE PAIN OF RIGHT SHOULDER: Primary | ICD-10-CM

## 2022-05-19 DIAGNOSIS — F11.20 CONTINUOUS OPIOID DEPENDENCE (HCC): ICD-10-CM

## 2022-05-19 DIAGNOSIS — M75.101 ROTATOR CUFF TEAR ARTHROPATHY, RIGHT: ICD-10-CM

## 2022-05-19 DIAGNOSIS — S49.91XA SHOULDER INJURY, RIGHT, INITIAL ENCOUNTER: ICD-10-CM

## 2022-05-19 DIAGNOSIS — E53.8 FOLATE DEFICIENCY: ICD-10-CM

## 2022-05-19 DIAGNOSIS — Z13.0 SCREENING FOR IRON DEFICIENCY ANEMIA: ICD-10-CM

## 2022-05-19 DIAGNOSIS — I25.10 CORONARY ARTERY DISEASE INVOLVING NATIVE CORONARY ARTERY OF NATIVE HEART WITHOUT ANGINA PECTORIS: ICD-10-CM

## 2022-05-19 DIAGNOSIS — N18.31 STAGE 3A CHRONIC KIDNEY DISEASE (HCC): ICD-10-CM

## 2022-05-19 DIAGNOSIS — M25.511 ACUTE PAIN OF RIGHT SHOULDER: ICD-10-CM

## 2022-05-19 DIAGNOSIS — M12.811 ROTATOR CUFF TEAR ARTHROPATHY, RIGHT: ICD-10-CM

## 2022-05-19 PROCEDURE — 99214 OFFICE O/P EST MOD 30 MIN: CPT | Performed by: STUDENT IN AN ORGANIZED HEALTH CARE EDUCATION/TRAINING PROGRAM

## 2022-05-19 PROCEDURE — 73030 X-RAY EXAM OF SHOULDER: CPT

## 2022-05-19 RX ORDER — CLOPIDOGREL BISULFATE 75 MG/1
75 TABLET ORAL DAILY
COMMUNITY

## 2022-05-19 RX ORDER — TRAMADOL HYDROCHLORIDE 50 MG/1
50 TABLET ORAL EVERY 6 HOURS PRN
Qty: 40 TABLET | Refills: 1 | Status: SHIPPED | OUTPATIENT
Start: 2022-05-19

## 2022-05-19 NOTE — PROGRESS NOTES
[facility],     Assessment/Plan:      Diagnosis ICD-10-CM Associated Orders   1  Acute pain of right shoulder  M25 511 XR shoulder 2+ vw right     Ambulatory Referral to Physical Therapy   2  Shoulder injury, right, initial encounter  S49  91XA XR shoulder 2+ vw right     Ambulatory Referral to Physical Therapy   3  Continuous opioid dependence ([address] )  F11 20    4  Paroxysmal atrial fibrillation (HCC)  I48 0    5  Stage 3a chronic kidney disease (HCC)  N18 31 Comprehensive metabolic panel   6  Rotator cuff tear arthropathy, right  M75 101 Ambulatory Referral to Physical Therapy    M12 811    7  Acute myofascial strain of lumbar region  S39 012A traMADol (ULTRAM) 50 mg tablet   8  Coronary artery disease involving native coronary artery of native heart without angina pectoris  I25 10 Lipid Panel with Direct LDL reflex   9  Folate deficiency  E53 8 Folate   10  Screening for iron deficiency anemia  Z13 0 CBC and differential      Screening blood work ordered as above  Can be reviewed at next visit   Patient has concerning symptoms for right shoulder rotator cuff tear, possibly partial involving both supra and infraspinatus  Pain and weakness on exam today  Recommend physical therapy and x-ray the shoulder  Return in about 6 weeks (around 6/30/2022) for Annual Medicare Wellness with Adrienne Wright    Patient may call or return to office with any questions or concerns  ______________________________________________________________________  Subjective:     Patient ID: Chester Sanabria is a 81 y o  male  HPI  Chester Sanabria  Chief Complaint   Patient presents with    Shoulder Pain     Upper right      Just had fall last Thursday on [facility] when he tripped on mat & fell back  Caught himself on railing on right arm  R-handed  Prior to injury no arm or shoulder pain  Taking tramadol as needed  Just had INR of 7 0 in [place] & had Vit K oral treatment  Has been down to 2 3 since then  Bruising easier  Has coumadin clinic nurse in [place] - for      The following portions of the patient's history were reviewed and updated as appropriate: allergies, current medications, past medical history and problem list     Review of Systems   Constitutional: Negative for chills and fever  HENT: Negative for congestion and rhinorrhea  Respiratory: Negative for cough and shortness of breath  Cardiovascular: Negative for chest pain and palpitations  Gastrointestinal: Negative for constipation and diarrhea  Musculoskeletal: Positive for arthralgias  Negative for gait problem  Neurological: Negative for dizziness, light-headedness and headaches  Objective:      Vitals:    05/19/22 1258   BP: 140/80   Pulse: 61   SpO2: 96%      Physical Exam  Vitals reviewed  Constitutional:       General: He is not in acute distress  Appearance: Normal appearance  He is well-developed  He is not ill-appearing  HENT:      Head: Normocephalic and atraumatic  Eyes:      General: No scleral icterus  Right eye: No discharge  Left eye: No discharge  Cardiovascular:      Rate and Rhythm: Normal rate and regular rhythm  Pulses: Normal pulses  Heart sounds: Normal heart sounds  No murmur heard  Pulmonary:      Effort: Pulmonary effort is normal  No respiratory distress  Breath sounds: Normal breath sounds  No stridor  No wheezing  Musculoskeletal:      Cervical back: Normal range of motion  Skin:     General: Skin is warm and dry  Neurological:      Mental Status: He is alert and oriented to person, place, and time  Psychiatric:         Mood and Affect: Mood normal          Behavior: Behavior normal          Thought Content:  Thought content normal          Judgment: Judgment normal          RIGHT SHOULDER:  Erythema: no  Swelling: no  Increased Warmth: no    Tenderness:  Rotator cuff insertion    ROM  Touchdown sign:  Severely limited by pain  External Rotation:  Limited by pain  Internal Rotation:  Limited by pain    Strength  Abduction: 4/5  ER: 4/5  IR: 5/5    Drop-Arm: negative  Emptycan:  Positive  Belly Press:  Negative    [place]: +  Speeds: +     LEFT SHOULDER:  Strength  Abduction: 5/5  ER: 5/5  IR: 5/5    ROM  Touchdown sign: intact  Empty can: negative      Portions of the record may have been created with voice recognition software  Occasional wrong word or "sound alike" substitutions may have occurred due to the inherent limitations of voice recognition software  Please review the chart carefully and recognize, using context, where substitutions/typographical errors may have occurred

## 2022-05-20 ENCOUNTER — APPOINTMENT (OUTPATIENT)
Dept: LAB | Facility: HOSPITAL | Age: 82
End: 2022-05-20
Attending: STUDENT IN AN ORGANIZED HEALTH CARE EDUCATION/TRAINING PROGRAM
Payer: MEDICARE

## 2022-05-20 DIAGNOSIS — Z13.0 SCREENING FOR IRON DEFICIENCY ANEMIA: ICD-10-CM

## 2022-05-20 DIAGNOSIS — E53.8 FOLATE DEFICIENCY: ICD-10-CM

## 2022-05-20 DIAGNOSIS — I25.10 CORONARY ARTERY DISEASE INVOLVING NATIVE CORONARY ARTERY OF NATIVE HEART WITHOUT ANGINA PECTORIS: ICD-10-CM

## 2022-05-20 DIAGNOSIS — N18.31 STAGE 3A CHRONIC KIDNEY DISEASE (HCC): ICD-10-CM

## 2022-05-20 LAB
ALBUMIN SERPL BCP-MCNC: 3.1 G/DL (ref 3.5–5)
ALP SERPL-CCNC: 150 U/L (ref 46–116)
ALT SERPL W P-5'-P-CCNC: 31 U/L (ref 12–78)
ANION GAP SERPL CALCULATED.3IONS-SCNC: 9 MMOL/L (ref 4–13)
AST SERPL W P-5'-P-CCNC: 61 U/L (ref 5–45)
BASOPHILS # BLD AUTO: 0.04 THOUSANDS/ΜL (ref 0–0.1)
BASOPHILS NFR BLD AUTO: 1 % (ref 0–1)
BILIRUB SERPL-MCNC: 0.6 MG/DL (ref 0.2–1)
BUN SERPL-MCNC: 14 MG/DL (ref 5–25)
CALCIUM ALBUM COR SERPL-MCNC: 9.2 MG/DL (ref 8.3–10.1)
CALCIUM SERPL-MCNC: 8.5 MG/DL (ref 8.3–10.1)
CHLORIDE SERPL-SCNC: 103 MMOL/L (ref 100–108)
CHOLEST SERPL-MCNC: 195 MG/DL
CO2 SERPL-SCNC: 29 MMOL/L (ref 21–32)
CREAT SERPL-MCNC: 1.08 MG/DL (ref 0.6–1.3)
EOSINOPHIL # BLD AUTO: 0.03 THOUSAND/ΜL (ref 0–0.61)
EOSINOPHIL NFR BLD AUTO: 1 % (ref 0–6)
ERYTHROCYTE [DISTWIDTH] IN BLOOD BY AUTOMATED COUNT: 13 % (ref 11.6–15.1)
FOLATE SERPL-MCNC: 6.9 NG/ML (ref 3.1–17.5)
GFR SERPL CREATININE-BSD FRML MDRD: 64 ML/MIN/1.73SQ M
GLUCOSE P FAST SERPL-MCNC: 94 MG/DL (ref 65–99)
HCT VFR BLD AUTO: 36.8 % (ref 36.5–49.3)
HDLC SERPL-MCNC: 121 MG/DL
HGB BLD-MCNC: 12.2 G/DL (ref 12–17)
IMM GRANULOCYTES # BLD AUTO: 0.03 THOUSAND/UL (ref 0–0.2)
IMM GRANULOCYTES NFR BLD AUTO: 1 % (ref 0–2)
LDLC SERPL CALC-MCNC: 63 MG/DL (ref 0–100)
LYMPHOCYTES # BLD AUTO: 1.32 THOUSANDS/ΜL (ref 0.6–4.47)
LYMPHOCYTES NFR BLD AUTO: 21 % (ref 14–44)
MCH RBC QN AUTO: 38.6 PG (ref 26.8–34.3)
MCHC RBC AUTO-ENTMCNC: 33.2 G/DL (ref 31.4–37.4)
MCV RBC AUTO: 117 FL (ref 82–98)
MONOCYTES # BLD AUTO: 1.27 THOUSAND/ΜL (ref 0.17–1.22)
MONOCYTES NFR BLD AUTO: 20 % (ref 4–12)
NEUTROPHILS # BLD AUTO: 3.66 THOUSANDS/ΜL (ref 1.85–7.62)
NEUTS SEG NFR BLD AUTO: 56 % (ref 43–75)
NRBC BLD AUTO-RTO: 0 /100 WBCS
PLATELET # BLD AUTO: 184 THOUSANDS/UL (ref 149–390)
PMV BLD AUTO: 10.2 FL (ref 8.9–12.7)
POTASSIUM SERPL-SCNC: 4 MMOL/L (ref 3.5–5.3)
PROT SERPL-MCNC: 6.9 G/DL (ref 6.4–8.2)
RBC # BLD AUTO: 3.16 MILLION/UL (ref 3.88–5.62)
SODIUM SERPL-SCNC: 141 MMOL/L (ref 136–145)
TRIGL SERPL-MCNC: 53 MG/DL
WBC # BLD AUTO: 6.35 THOUSAND/UL (ref 4.31–10.16)

## 2022-05-20 PROCEDURE — 36415 COLL VENOUS BLD VENIPUNCTURE: CPT

## 2022-05-20 PROCEDURE — 80061 LIPID PANEL: CPT

## 2022-05-20 PROCEDURE — 80053 COMPREHEN METABOLIC PANEL: CPT

## 2022-05-20 PROCEDURE — 82746 ASSAY OF FOLIC ACID SERUM: CPT

## 2022-05-20 PROCEDURE — 85025 COMPLETE CBC W/AUTO DIFF WBC: CPT

## 2022-05-23 ENCOUNTER — TELEPHONE (OUTPATIENT)
Dept: FAMILY MEDICINE CLINIC | Facility: HOSPITAL | Age: 82
End: 2022-05-23

## 2022-05-23 NOTE — TELEPHONE ENCOUNTER
Patient is calling for x-ray results from May 19, 2022 for this back  Patient saw Dr Mariella Talley

## 2022-06-02 ENCOUNTER — EVALUATION (OUTPATIENT)
Dept: PHYSICAL THERAPY | Facility: CLINIC | Age: 82
End: 2022-06-02
Payer: MEDICARE

## 2022-06-02 DIAGNOSIS — S49.91XD RIGHT SHOULDER INJURY, SUBSEQUENT ENCOUNTER: ICD-10-CM

## 2022-06-02 DIAGNOSIS — M12.811 ROTATOR CUFF TEAR ARTHROPATHY, RIGHT: ICD-10-CM

## 2022-06-02 DIAGNOSIS — M75.101 ROTATOR CUFF TEAR ARTHROPATHY, RIGHT: ICD-10-CM

## 2022-06-02 DIAGNOSIS — M25.511 ACUTE PAIN OF RIGHT SHOULDER: ICD-10-CM

## 2022-06-02 PROCEDURE — 97161 PT EVAL LOW COMPLEX 20 MIN: CPT | Performed by: PHYSICAL THERAPIST

## 2022-06-02 PROCEDURE — 97110 THERAPEUTIC EXERCISES: CPT | Performed by: PHYSICAL THERAPIST

## 2022-06-02 NOTE — PROGRESS NOTES
PT Evaluation     Today's date: 2022  Patient name: Jorge Luis Montenegro  : 1940  MRN: [id number]  Referring provider: Denis Kang DO  Dx:   Encounter Diagnosis     ICD-10-CM    1  Acute pain of right shoulder  M25 511 Ambulatory Referral to Physical Therapy   2  Right shoulder injury, subsequent encounter  S49  91XD Ambulatory Referral to Physical Therapy   3  Rotator cuff tear arthropathy, right  M75 101 Ambulatory Referral to Physical Therapy    M12 811                   Assessment  Assessment details: Jorge Luis Montenegro is a 81 y o  male presenting to outpatient physical therapy at [place] [address] with complaints of anterior R shoulder pain  He presents with decreased R shoulder range of motion, decreased R UE and postural strength, limited flexibility, poor postural awareness, decreased tolerance to activity and decreased functional mobility due to a R shoulder strain sustained on 22 when he caught himself on a railing as he was falling  He has mild to oderate R GH jt OA  His PROM of R shoulder is excellent  He would benefit from skilled PT services in order to address these deficits and reach maximum level of function  Thank you for the referral!  Impairments: abnormal or restricted ROM, activity intolerance, impaired physical strength, lacks appropriate home exercise program, pain with function and poor posture   Barriers to therapy: None  Understanding of Dx/Px/POC: fair   Prognosis: fair    Goals  ST  Independent with HEP in 2 weeks  2  Increase R shoulder AROM to WNL all motions in 3 weeks   3  Good postural awareness in 2 weeks    LT  Achieve FOTO score of 54/100 in 8 weeks   2  Able to reach to [place] levels and across body with R UE in 8 weeks  3    Strength R UE and traps = 4/5 all motions in 8 weeks      Plan  Patient would benefit from: skilled PT and PT eval  Planned modality interventions: cryotherapy, TENS and thermotherapy: hydrocollator packs  Planned therapy interventions: ADL retraining, flexibility, functional ROM exercises, home exercise program, joint mobilization, manual therapy, neuromuscular re-education, postural training, strengthening, stretching, therapeutic activities and therapeutic exercise  Frequency: 2x week  Duration in weeks: 8  Treatment plan discussed with: patient        Subjective Evaluation    History of Present Illness  Mechanism of injury: Pt reports falling on 22 on his porch at home when he tripped on a mat and fell backward  He caught himself on a railing with his R arm  He is R handed  Most pain is anterior R shoulder  Taking tramadol as needed  Pt has A-fib, kidney dz, CAD, chronic LBP, sleep apnea  Unable to reach across his body to turn off his C-PAP machine  Unable to reach to  levels with R UE  Not a recurrent problem   Quality of life: fair    Pain  Current pain ratin  At best pain ratin  At worst pain ratin  Quality: sharp, tight and dull ache  Relieving factors: medications  Aggravating factors: overhead activity and lifting  Progression: improved    Social Support  Steps to enter house: yes  Stairs in house: yes   Lives in: multiple-level home  Lives with: spouse    Employment status: not working  Hand dominance: right      Diagnostic Tests  X-ray: abnormal (Mild to moderate osteoarthritis of the glenohumeral joint  Mild osteoarthritic degenerative changes are noted at the acromioclavicular joint  Jakob Hutchison )  Treatments  Previous treatment: medication  Current treatment: medication  Patient Goals  Patient goals for therapy: increased strength, independence with ADLs/IADLs, return to sport/leisure activities, increased motion and decreased pain          Objective     Static Posture     Head  Forward  Shoulders  Rounded  Postural Observations  Seated posture: poor  Standing posture: fair  Correction of posture: makes symptoms better        Observations     Right Shoulder  Negative for effusion  Palpation     Right   No palpable tenderness to the biceps  Hypertonic in the pectoralis minor  Tenderness of the anterior deltoid  Tenderness     Right Shoulder  Tenderness in the [facility] joint, biceps tendon (proximal) and infraspinatus tendon  No tenderness in the supraspinatus tendon       Neurological Testing     Sensation     Shoulder   Left Shoulder   Intact: light touch    Right Shoulder   Intact: light touch    Active Range of Motion   Left Shoulder   Flexion: 155 degrees   Extension: WFL  Abduction: 150 degrees   External rotation 0°: WFL  Internal rotation 45°: WFL    Right Shoulder   Flexion: 86 degrees with pain  Extension: WFL  Abduction: 77 degrees   External rotation 0°: WFL  Internal rotation 45°: WFL    Passive Range of Motion   Left Shoulder   Normal passive range of motion    Right Shoulder   Normal passive range of motion    Scapular Mobility   Left Shoulder   Scapular mobility: WFL    Right Shoulder   Scapular mobility: good    Strength/Myotome Testing     Left Shoulder     Planes of Motion   Flexion: 5   Extension: 5   Abduction: 5   External rotation at 0°: 5   Internal rotation at 0°: 5     Isolated Muscles   Biceps: 5   Middle trapezius: 4+     Right Shoulder     Planes of Motion   Flexion: 2+   Extension: 5   Abduction: 2+   External rotation at 0°: 4-   Internal rotation at 0°: 5     Isolated Muscles   Biceps: 5   Middle trapezius: 4-         POC EXPIRES On:  7/28/22  PRECAUTIONS:  None  CO-MORBIDITES:  A-fib, kidney dz, CAD, chronic LBP, sleep apnea   PERSONAL FACTORS:  None      Manuals HEP 6/2                                                               Neuro Re-Ed     Seated B scap retraction 6/2 10           TB rows B             TB R shoulder ER                                                                 Ther Ex    Pendulums R 6/2 3# 1'           Cane AAROM R shoulder flex standing 6/2 10 Ther Activity                              Gait Training                              Modalities

## 2022-06-06 ENCOUNTER — OFFICE VISIT (OUTPATIENT)
Dept: PHYSICAL THERAPY | Facility: CLINIC | Age: 82
End: 2022-06-06
Payer: MEDICARE

## 2022-06-06 DIAGNOSIS — M25.511 ACUTE PAIN OF RIGHT SHOULDER: Primary | ICD-10-CM

## 2022-06-06 DIAGNOSIS — S49.91XD RIGHT SHOULDER INJURY, SUBSEQUENT ENCOUNTER: ICD-10-CM

## 2022-06-06 DIAGNOSIS — M75.101 ROTATOR CUFF TEAR ARTHROPATHY, RIGHT: ICD-10-CM

## 2022-06-06 DIAGNOSIS — M12.811 ROTATOR CUFF TEAR ARTHROPATHY, RIGHT: ICD-10-CM

## 2022-06-06 PROCEDURE — 97112 NEUROMUSCULAR REEDUCATION: CPT

## 2022-06-06 PROCEDURE — 97110 THERAPEUTIC EXERCISES: CPT

## 2022-06-06 NOTE — PROGRESS NOTES
Daily Note     Today's date: 2022  Patient name: Sofia Thomas  : 1940  MRN: [id number]  Referring provider: Jozfe Robles DO  Dx:   Encounter Diagnosis     ICD-10-CM    1  Acute pain of right shoulder  M25 511    2  Right shoulder injury, subsequent encounter  S49  91XD    3  Rotator cuff tear arthropathy, right  M75 101     M12 811                   Subjective: Pt states he believes PT is helping  Objective: See treatment diary below  Survey Monkey given (_____) and FOTO given (_____)      Assessment: Pt arrived 15mins late, accommodated  Pt performed below TE with great tolerance however, required breaks due to ms fatigue          Plan: Continue plan of care     POC EXPIRES On:  22  PRECAUTIONS:  None  CO-MORBIDITES:  A-fib, kidney dz, CAD, chronic LBP, sleep apnea   PERSONAL FACTORS:  None      Manuals HEP 6/2 6/6                                                              Neuro Re-Ed     Seated B scap retraction /2 10 10          TB rows B   btb 20          TB R shoulder ER             TB LDP B   grn 20                                                 Ther Ex    Pulleys flex/scap   3'/3'          Pendulums R /2 3# 1' 3'1          Cane AAROM R shoulder flex standing /2 10 20          Bicep curls   5# 20          Modified ext   3# 20          Modified Rows   3# 20                                                 Ther Activity                              Gait Training                              Modalities

## 2022-06-08 ENCOUNTER — OFFICE VISIT (OUTPATIENT)
Dept: PHYSICAL THERAPY | Facility: CLINIC | Age: 82
End: 2022-06-08
Payer: MEDICARE

## 2022-06-08 DIAGNOSIS — M75.101 ROTATOR CUFF TEAR ARTHROPATHY, RIGHT: ICD-10-CM

## 2022-06-08 DIAGNOSIS — M25.511 ACUTE PAIN OF RIGHT SHOULDER: Primary | ICD-10-CM

## 2022-06-08 DIAGNOSIS — S49.91XD RIGHT SHOULDER INJURY, SUBSEQUENT ENCOUNTER: ICD-10-CM

## 2022-06-08 DIAGNOSIS — M12.811 ROTATOR CUFF TEAR ARTHROPATHY, RIGHT: ICD-10-CM

## 2022-06-08 PROCEDURE — 97110 THERAPEUTIC EXERCISES: CPT

## 2022-06-08 PROCEDURE — 97112 NEUROMUSCULAR REEDUCATION: CPT

## 2022-06-08 NOTE — PROGRESS NOTES
Daily Note     Today's date: 2022  Patient name: Charlie Gerber  : 1940  MRN: [id number]  Referring provider: Viv Lofton DO  Dx:   Encounter Diagnosis     ICD-10-CM    1  Acute pain of right shoulder  M25 511    2  Right shoulder injury, subsequent encounter  S49  91XD    3  Rotator cuff tear arthropathy, right  M75 101     M12 811                   Subjective: Pt states he believes PT is helping  Objective: See treatment diary below  Survey Monkey given (_____) and FOTO given (_____)      Assessment: Pt experienced anterior pain/discomfort during AROM flexion  Introduced cone placing on shelf fwd/side, to challenge pt flexion strength/endurance         Plan: Continue plan of care     POC EXPIRES On:  22  PRECAUTIONS:  None  CO-MORBIDITES:  A-fib, kidney dz, CAD, chronic LBP, sleep apnea   PERSONAL FACTORS:  None      Manuals HEP 6/2 6/6 6/8                                                             Neuro Re-Ed     Seated B scap retraction /2 10 10 10         TB rows B   btb 20 btb 20         TB R shoulder ER             TB LDP B   grn 20 grn 20                                                Ther Ex    Pulleys flex/scap   3'/3' 3'/3'         Pendulums R 6/2 3# 1' 3'1 3# 1'         Cane AAROM R shoulder flex standing 6/2 10 20 20         Bicep curls   5# 20 5# 20         Modified ext   3# 20 3# 20         Modified Rows   3# 20 3# 20                                                Ther Activity    5Cone placing on shelf fwd/side    2x ea                      Gait Training                              Modalities

## 2022-06-13 ENCOUNTER — OFFICE VISIT (OUTPATIENT)
Dept: PHYSICAL THERAPY | Facility: CLINIC | Age: 82
End: 2022-06-13
Payer: MEDICARE

## 2022-06-13 DIAGNOSIS — M25.511 ACUTE PAIN OF RIGHT SHOULDER: Primary | ICD-10-CM

## 2022-06-13 DIAGNOSIS — M12.811 ROTATOR CUFF TEAR ARTHROPATHY, RIGHT: ICD-10-CM

## 2022-06-13 DIAGNOSIS — S49.91XD RIGHT SHOULDER INJURY, SUBSEQUENT ENCOUNTER: ICD-10-CM

## 2022-06-13 DIAGNOSIS — M75.101 ROTATOR CUFF TEAR ARTHROPATHY, RIGHT: ICD-10-CM

## 2022-06-13 PROCEDURE — 97110 THERAPEUTIC EXERCISES: CPT

## 2022-06-13 NOTE — PROGRESS NOTES
Daily Note     Today's date: 2022  Patient name: Rosa Maria Nunez  : 1940  MRN: [id number]  Referring provider: Tal Lazo DO  Dx:   Encounter Diagnosis     ICD-10-CM    1  Acute pain of right shoulder  M25 511    2  Right shoulder injury, subsequent encounter  S49  91XD    3  Rotator cuff tear arthropathy, right  M75 101     M12 811                   Subjective: Pt states his PT is helping, able to do task without UE pain  Objective: See treatment diary below  Survey Monkey given (_____) and FOTO given (_____)      Assessment:  Introduced cuff wt  cone placing on shelf fwd/side, to challenge pt flexion strength/endurance         Plan: Continue plan of care     POC EXPIRES On:  22  PRECAUTIONS:  None  CO-MORBIDITES:  A-fib, kidney dz, CAD, chronic LBP, sleep apnea   PERSONAL FACTORS:  None      Manuals HEP /2 66 6/8                                                             Neuro Re-Ed     Seated B scap retraction  10 10 10 D/c        TB rows B   btb 20 btb 20 btb 20        TB LDP B   grn 20 grn 20 btb 20                                               Ther Ex    Pulleys flex/scap   3'/3' 3'/3' 3'/3'        Pendulums R 6/2 3# 1' 3'1 3# 1' 3# 1        Cane AAROM R shoulder flex standing /2 10 20 20 20        Bicep curls   5# 20 5# 20 5# 20        Modified ext   3# 20 3# 20 3# 20        Modified Rows   3# 20 3# 20 5# 20                                               Ther Activity    5Cone placing on shelf fwd/side    2x ea 2# 2x ea                     Gait Training                              Modalities

## 2022-06-15 ENCOUNTER — OFFICE VISIT (OUTPATIENT)
Dept: PHYSICAL THERAPY | Facility: CLINIC | Age: 82
End: 2022-06-15
Payer: MEDICARE

## 2022-06-15 DIAGNOSIS — M25.511 ACUTE PAIN OF RIGHT SHOULDER: Primary | ICD-10-CM

## 2022-06-15 DIAGNOSIS — S49.91XD RIGHT SHOULDER INJURY, SUBSEQUENT ENCOUNTER: ICD-10-CM

## 2022-06-15 DIAGNOSIS — M75.101 ROTATOR CUFF TEAR ARTHROPATHY, RIGHT: ICD-10-CM

## 2022-06-15 DIAGNOSIS — M12.811 ROTATOR CUFF TEAR ARTHROPATHY, RIGHT: ICD-10-CM

## 2022-06-15 PROCEDURE — 97110 THERAPEUTIC EXERCISES: CPT

## 2022-06-15 PROCEDURE — 97112 NEUROMUSCULAR REEDUCATION: CPT

## 2022-06-15 NOTE — PROGRESS NOTES
Daily Note     Today's date: 6/15/2022  Patient name: Magalie Talley  : 1940  MRN: [id number]  Referring provider: Meghan Flowers DO  Dx:   Encounter Diagnosis     ICD-10-CM    1  Acute pain of right shoulder  M25 511    2  Right shoulder injury, subsequent encounter  S49  91XD    3  Rotator cuff tear arthropathy, right  M75 101     M12 811                   Subjective: Pt states feeling good, PT has helped  Objective: See treatment diary below  Survey Monkey given (6/15) and FOTO given (6/15)      Assessment:  FOTO scores has improved since I E  Pt required minimal vcs to maintain good form         Plan: possible d/c to HEP     POC EXPIRES On:  22  PRECAUTIONS:  None  CO-MORBIDITES:  A-fib, kidney dz, CAD, chronic LBP, sleep apnea   PERSONAL FACTORS:  None      Manuals HEP 6/2 6/6 6/8 6/13 6/15                                                           Neuro Re-Ed     Seated B scap retraction  10 10 10 D/c        TB rows B   btb 20 btb 20 btb 20 btb 20       TB LDP B   grn 20 grn 20 btb 20 btb 20                                              Ther Ex    Pulleys flex/scap   3'/3' 3'/3' 3'/3' 3'/3'       Pendulums R / 3# 1' 3'1 3# 1' 3# 1 5# 1        Cane AAROM R shoulder flex standing 2 10 20 20 20 20       Bicep curls   5# 20 5# 20 5# 20 5# 20       Modified ext   3# 20 3# 20 3# 20 5# 20       Modified Rows   3# 20 3# 20 5# 20 5# 20                                              Ther Activity    5Cone placing on shelf fwd/side    2x ea 2# 2x ea 2# 2x ea                    Gait Training                              Modalities

## 2022-07-11 DIAGNOSIS — I48.0 PAROXYSMAL ATRIAL FIBRILLATION (HCC): ICD-10-CM

## 2022-07-12 RX ORDER — AMIODARONE HYDROCHLORIDE 200 MG/1
TABLET ORAL
Qty: 90 TABLET | Refills: 0 | Status: SHIPPED | OUTPATIENT
Start: 2022-07-12

## 2022-08-05 ENCOUNTER — RA CDI HCC (OUTPATIENT)
Dept: OTHER | Facility: HOSPITAL | Age: 82
End: 2022-08-05

## 2022-08-05 NOTE — PROGRESS NOTES
[address]  coding opportunities       Chart reviewed, no opportunity found: CHART REVIEWED, NO OPPORTUNITY FOUND        Patients Insurance     Medicare Insurance: Medicare

## 2022-08-11 ENCOUNTER — OFFICE VISIT (OUTPATIENT)
Dept: FAMILY MEDICINE CLINIC | Facility: HOSPITAL | Age: 82
End: 2022-08-11
Payer: MEDICARE

## 2022-08-11 VITALS
OXYGEN SATURATION: 96 % | HEART RATE: 60 BPM | SYSTOLIC BLOOD PRESSURE: 114 MMHG | WEIGHT: 202 LBS | DIASTOLIC BLOOD PRESSURE: 60 MMHG | BODY MASS INDEX: 28.98 KG/M2

## 2022-08-11 DIAGNOSIS — I25.10 CORONARY ARTERY DISEASE INVOLVING NATIVE CORONARY ARTERY OF NATIVE HEART WITHOUT ANGINA PECTORIS: ICD-10-CM

## 2022-08-11 DIAGNOSIS — I10 ESSENTIAL HYPERTENSION: ICD-10-CM

## 2022-08-11 DIAGNOSIS — R26.89 BALANCE DISORDER: ICD-10-CM

## 2022-08-11 DIAGNOSIS — I48.0 PAROXYSMAL ATRIAL FIBRILLATION (HCC): ICD-10-CM

## 2022-08-11 DIAGNOSIS — Z95.3 S/P AORTIC VALVE REPLACEMENT WITH BIOPROSTHETIC VALVE: Primary | ICD-10-CM

## 2022-08-11 PROCEDURE — G0439 PPPS, SUBSEQ VISIT: HCPCS | Performed by: INTERNAL MEDICINE

## 2022-08-11 PROCEDURE — 99213 OFFICE O/P EST LOW 20 MIN: CPT | Performed by: INTERNAL MEDICINE

## 2022-08-11 RX ORDER — CLOPIDOGREL BISULFATE 75 MG/1
75 TABLET ORAL DAILY
Qty: 90 TABLET | Refills: 1 | Status: SHIPPED | OUTPATIENT
Start: 2022-08-11

## 2022-08-11 NOTE — PROGRESS NOTES
Assessment/Plan:       Diagnoses and all orders for this visit:    S/P aortic valve replacement with bioprosthetic valve    Coronary artery disease involving native coronary artery of native heart without angina pectoris    Essential hypertension    Paroxysmal atrial fibrillation (HCC)  -     clopidogrel (PLAVIX) 75 mg tablet; Take 1 tablet (75 mg total) by mouth daily    Balance disorder          All of the above diagnoses have been assessed  Additional COMMENTS/PLAN: Labs reviewed and discussed with the patient  Subjective:      Patient ID: Campbell Felton is a 81 y o  male  HPI     Paroxysmal atrial fibrillation-patient currently has a rhythm control strategy  Patient denies any palpitations, lightheadedness or dizziness  Appropriate anticoagulation therapy is applied  Coronary artery disease-The patient denies CP, SOB or palpitations  The patient is compliant with antiplatelet and secondary prevention regimens  Follows with card in [place]  Balance disorders-this is stable        The following portions of the patient's history were revised and updated as appropriate: Problem list, allergies, med list, FH, SH, Past medical and surgical histories      Current Outpatient Medications   Medication Sig Dispense Refill    amiodarone 200 mg tablet Take 1 tablet by mouth once daily 90 tablet 0    atorvastatin (LIPITOR) 40 mg tablet Take 1 tablet (40 mg total) by mouth daily 90 tablet 3    clopidogrel (PLAVIX) 75 mg tablet Take 1 tablet (75 mg total) by mouth daily 90 tablet 1    famotidine (PEPCID) 20 mg tablet Take 1 tablet (20 mg total) by mouth 2 (two) times a day 180 tablet 3    furosemide (LASIX) 40 mg tablet TAKE 1 TABLET BY MOUTH TWICE A  tablet 3    pyridoxine (B-6) 100 MG tablet Take 120 mg by mouth      traMADol (ULTRAM) 50 mg tablet Take 1 tablet (50 mg total) by mouth every 6 (six) hours as needed for moderate pain 40 tablet 1    warfarin (COUMADIN) 2 5 mg tablet 2 5mg or 5mg daily as directed  90 tablet 3     No current facility-administered medications for this visit  Review of Systems   All other systems reviewed and are negative  Objective:    /60   Pulse 60   Wt 91 6 kg (202 lb)   SpO2 96%   BMI 28 98 kg/m²     BP Readings from Last 3 Encounters:   08/11/22 114/60   05/19/22 140/80   09/20/21 118/70                  Wt Readings from Last 3 Encounters:   08/11/22 91 6 kg (202 lb)   05/19/22 98 9 kg (218 lb)   09/20/21 76 3 kg (168 lb 3 2 oz)         Physical Exam  Vitals reviewed  Constitutional:       Appearance: Normal appearance  Neck:      Vascular: No carotid bruit  Cardiovascular:      Pulses: Normal pulses  Heart sounds: Murmur heard  Pulmonary:      Effort: Pulmonary effort is normal       Breath sounds: Normal breath sounds  Abdominal:      General: Abdomen is flat  Bowel sounds are normal       Palpations: Abdomen is soft  Musculoskeletal:      Comments: Plus 2 edema   Skin:     Comments: Venous stasis changes  Neurological:      Mental Status: He is alert  No visits with results within 2 Week(s) from this visit     Latest known visit with results is:   Appointment on 05/20/2022   Component Date Value Ref Range Status    WBC 05/20/2022 6 35  4 31 - 10 16 Thousand/uL Final    RBC 05/20/2022 3 16 (A) 3 88 - 5 62 Million/uL Final    Hemoglobin 05/20/2022 12 2  12 0 - 17 0 g/dL Final    Hematocrit 05/20/2022 36 8  36 5 - 49 3 % Final    MCV 05/20/2022 117 (A) 82 - 98 fL Final    MCH 05/20/2022 38 6 (A) 26 8 - 34 3 pg Final    MCHC 05/20/2022 33 2  31 4 - 37 4 g/dL Final    RDW 05/20/2022 13 0  11 6 - 15 1 % Final    MPV 05/20/2022 10 2  8 9 - 12 7 fL Final    Platelets 42/76/8599 184  149 - 390 Thousands/uL Final    nRBC 05/20/2022 0  /100 WBCs Final    Neutrophils Relative 05/20/2022 56  43 - 75 % Final    Immat GRANS % 05/20/2022 1  0 - 2 % Final    Lymphocytes Relative 05/20/2022 21  14 - 44 % Final    Monocytes Relative 05/20/2022 20 (A) 4 - 12 % Final    Eosinophils Relative 05/20/2022 1  0 - 6 % Final    Basophils Relative 05/20/2022 1  0 - 1 % Final    Neutrophils Absolute 05/20/2022 3 66  1 85 - 7 62 Thousands/µL Final    Immature Grans Absolute 05/20/2022 0 03  0 00 - 0 20 Thousand/uL Final    Lymphocytes Absolute 05/20/2022 1 32  0 60 - 4 47 Thousands/µL Final    Monocytes Absolute 05/20/2022 1 27 (A) 0 17 - 1 22 Thousand/µL Final    Eosinophils Absolute 05/20/2022 0 03  0 00 - 0 61 Thousand/µL Final    Basophils Absolute 05/20/2022 0 04  0 00 - 0 10 Thousands/µL Final    Sodium 05/20/2022 141  136 - 145 mmol/L Final    Potassium 05/20/2022 4 0  3 5 - 5 3 mmol/L Final    Chloride 05/20/2022 103  100 - 108 mmol/L Final    CO2 05/20/2022 29  21 - 32 mmol/L Final    ANION GAP 05/20/2022 9  4 - 13 mmol/L Final    BUN 05/20/2022 14  5 - 25 mg/dL Final    Creatinine 05/20/2022 1 08  0 60 - 1 30 mg/dL Final    Standardized to IDMS reference method    Glucose, Fasting 05/20/2022 94  65 - 99 mg/dL Final    Specimen collection should occur prior to Sulfasalazine administration due to the potential for falsely depressed results  Specimen collection should occur prior to Sulfapyridine administration due to the potential for falsely elevated results   Calcium 05/20/2022 8 5  8 3 - 10 1 mg/dL Final    Corrected Calcium 05/20/2022 9 2  8 3 - 10 1 mg/dL Final    AST 05/20/2022 61 (A) 5 - 45 U/L Final    Specimen collection should occur prior to Sulfasalazine administration due to the potential for falsely depressed results   ALT 05/20/2022 31  12 - 78 U/L Final    Specimen collection should occur prior to Sulfasalazine administration due to the potential for falsely depressed results       Alkaline Phosphatase 05/20/2022 150 (A) 46 - 116 U/L Final    Total Protein 05/20/2022 6 9  6 4 - 8 2 g/dL Final    Albumin 05/20/2022 3 1 (A) 3 5 - 5 0 g/dL Final    Total Bilirubin 05/20/2022 0 60  0 20 - 1 00 mg/dL Final    Use of this assay is not recommended for patients undergoing treatment with eltrombopag due to the potential for falsely elevated results   eGFR 05/20/2022 64  ml/min/1 73sq m Final    Cholesterol 05/20/2022 195  See Comment mg/dL Final    Cholesterol:         Pediatric <18 Years        Desirable          <170 mg/dL      Borderline High    170-199 mg/dL      High               >=200 mg/dL        Adult >=18 Years            Desirable         <200 mg/dL      Borderline High   200-239 mg/dL      High              >239 mg/dL      Triglycerides 05/20/2022 53  See Comment mg/dL Final    Triglyceride:     0-9Y            <75mg/dL     10Y-17Y         <90 mg/dL       >=18Y     Normal          <150 mg/dL     Borderline High 150-199 mg/dL     High            200-499 mg/dL        Very High       >499 mg/dL    Specimen collection should occur prior to N-Acetylcysteine or Metamizole administration due to the potential for falsely depressed results   HDL, Direct 05/20/2022 121  >=40 mg/dL Final    Specimen collection should occur prior to Metamizole administration due to the potential for falsley depressed results   LDL Calculated 05/20/2022 63  0 - 100 mg/dL Final    LDL Cholesterol:     Optimal           <100 mg/dl     Near Optimal      100-129 mg/dl     Above Optimal       Borderline High 130-159 mg/dl       High            160-189 mg/dl       Very High       >189 mg/dl         This screening LDL is a calculated result  It does not have the accuracy of the Direct Measured LDL in the monitoring of patients with hyperlipidemia and/or statin therapy  Direct Measure LDL (XVP829) must be ordered separately in these patients   Folate 05/20/2022 6 9  3 1 - 17 5 ng/mL Final    Slightly Hemolyzed; Results May be Affected         Miles Davis MD    Some or all of this note was generated with a voice recognition dictation system and therefore my contain grammatical or spelling errors

## 2022-08-11 NOTE — PROGRESS NOTES
Assessment and Plan:     Problem List Items Addressed This Visit    None     Visit Diagnoses     Medicare annual wellness visit, subsequent    -  Primary           Preventive health issues were discussed with patient, and age appropriate screening tests were ordered as noted in patient's After Visit Summary  Personalized health advice and appropriate referrals for health education or preventive services given if needed, as noted in patient's After Visit Summary       History of Present Illness:     Patient presents for a Medicare Wellness Visit    HPI   Patient Care Team:  Hank Garrett MD as PCP - General (Internal Medicine)     Review of Systems:     Review of Systems     Problem List:     Patient Active Problem List   Diagnosis    [facility]    Coronary artery disease involving native coronary artery of native heart without angina pectoris    Essential hypertension    S/P aortic valve replacement with bioprosthetic valve    Benign prostatic hyperplasia without lower urinary tract symptoms    Gastroesophageal reflux disease without esophagitis    Stage III chronic kidney disease [facility]    Anticoagulation adequate    Balance disorder    Lumbar pain    Spinal stenosis of lumbar region    Lumbar spondylosis    DDD (degenerative disc disease), lumbar    Continuous opioid dependence ([facility] )      Past Medical and Surgical History:     Past Medical History:   Diagnosis Date    [facility] fibrillation [facility]     BPH (benign prostatic hyperplasia)     [facility]     GERD (gastroesophageal reflux disease)     Skin cancer     Sleep apnea      Past Surgical History:   Procedure Laterality Date    CARDIAC SURGERY      CHOLECYSTECTOMY      CORONARY ARTERY BYPASS GRAFT      CORONARY STENT PLACEMENT      TONSILLECTOMY      VALVE REPLACEMENT        Family History:     Family History   Problem Relation Age of Onset    Cancer Mother     Dementia Father Social History:     Social History     Socioeconomic History    Marital status: /Civil Union     Spouse name: None    Number of children: None    Years of education: None    Highest education level: None   Occupational History    None   Tobacco Use    Smoking status: Never Smoker    Smokeless tobacco: Never Used   Vaping Use    Vaping Use: Never used   Substance and Sexual Activity    Alcohol use: Yes    Drug use: No    Sexual activity: None   Other Topics Concern    None   Social History Narrative    None     Social Determinants of Health     Financial Resource Strain: Low Risk     Difficulty of Paying Living Expenses: Not very hard   Food Insecurity: Not on file   Transportation Needs: No Transportation Needs    Lack of Transportation (Medical): No    Lack of Transportation (Non-Medical): No   Physical Activity: Not on file   Stress: Not on file   Social Connections: Not on file   Intimate Partner Violence: Not on file   Housing Stability: Not on file      Medications and Allergies:     Current Outpatient Medications   Medication Sig Dispense Refill    amiodarone 200 mg tablet Take 1 tablet by mouth once daily 90 tablet 0    atorvastatin (LIPITOR) 40 mg tablet Take 1 tablet (40 mg total) by mouth daily 90 tablet 3    clopidogrel (PLAVIX) 75 mg tablet Take 75 mg by mouth in the morning   famotidine (PEPCID) 20 mg tablet Take 1 tablet (20 mg total) by mouth 2 (two) times a day 180 tablet 3    furosemide (LASIX) 40 mg tablet TAKE 1 TABLET BY MOUTH TWICE A  tablet 3    pyridoxine (B-6) 100 MG tablet Take 120 mg by mouth      traMADol (ULTRAM) 50 mg tablet Take 1 tablet (50 mg total) by mouth every 6 (six) hours as needed for moderate pain 40 tablet 1    warfarin (COUMADIN) 2 5 mg tablet 2 5mg or 5mg daily as directed  90 tablet 3     No current facility-administered medications for this visit       No Known Allergies   Immunizations:     Immunization History   Administered Date(s) Administered    Influenza Split High Dose Preservative Free IM 10/07/2019    Influenza, high dose seasonal 0 7 mL 09/06/2018    Pneumococcal Conjugate 13-Valent 10/02/2015    Pneumococcal Polysaccharide PPV23 10/24/2017      Health Maintenance: There are no preventive care reminders to display for this patient  Topic Date Due    COVID-19 Vaccine (1) Never done    Influenza Vaccine (1) 09/01/2022      Medicare Screening Tests and Risk Assessments:     Arielle Chávez is here for his Subsequent Wellness visit  Health Risk Assessment:   Patient rates overall health as good  Patient feels that their physical health rating is same  Patient is very satisfied with their life  Eyesight was rated as same  Hearing was rated as same  Patient feels that their emotional and mental health rating is same  Patients states they are never, rarely angry  Patient states they are never, rarely unusually tired/fatigued  Pain experienced in the last 7 days has been none  Patient states that he has experienced no weight loss or gain in last 6 months  Depression Screening:   PHQ-2 Score: 0      Fall Risk Screening: In the past year, patient has experienced: history of falling in past year    Number of falls: 2 or more  Injured during fall?: No    Feels unsteady when standing or walking?: No    Worried about falling?: Yes      Home Safety:  Patient does not have trouble with stairs inside or outside of their home  Patient has working smoke alarms and has working carbon monoxide detector  Home safety hazards include: loose rugs on the floor  Nutrition:   Current diet is Regular  Medications:   Patient is currently taking over-the-counter supplements  OTC medications include: see medication list  Patient is able to manage medications       Activities of Daily Living (ADLs)/Instrumental Activities of Daily Living (IADLs):   Walk and transfer into and out of bed and chair?: Yes  Dress and groom yourself?: Yes Bathe or shower yourself?: Yes    Feed yourself? Yes  Do your laundry/housekeeping?: Yes  Manage your money, pay your bills and track your expenses?: Yes  Make your own meals?: Yes    Do your own shopping?: Yes    Previous Hospitalizations:   Any hospitalizations or ED visits within the last 12 months?: Yes      Advance Care Planning:   Living will: Yes    Durable POA for healthcare:  Yes    Advanced directive: Yes      PREVENTIVE SCREENINGS      Cardiovascular Screening:    General: Screening Current      Diabetes Screening:     General: Screening Current      Colorectal Cancer Screening:     General: Screening Not Indicated      Prostate Cancer Screening:    General: Screening Not Indicated      Osteoporosis Screening:    General: Screening Not Indicated      Abdominal Aortic Aneurysm (AAA) Screening:        General: Screening Not Indicated      Lung Cancer Screening:     General: Screening Not Indicated      Hepatitis C Screening:    General: Patient Declines      Preventive Screening Comments: Exercise-some walking in [facility]  Vaccines up to date    Screening, Brief Intervention, and Referral to Treatment (SBIRT)    Screening    Typical number of drinks in a week: 14    Single Item Drug Screening:  How often have you used an illegal drug (including marijuana) or a prescription medication for non-medical reasons in the past year? never    Single Item Drug Screen Score: 0  Interpretation: Negative screen for possible drug use disorder    Review of Current Opioid Use    Opioid Risk Tool (ORT) Interpretation: Complete Opioid Risk Tool (ORT)    No exam data present     Physical Exam:     /60   Pulse 60   Wt 91 6 kg (202 lb)   SpO2 96%   BMI 28 98 kg/m²     Physical Exam     Arnie Story MD

## 2022-08-22 ENCOUNTER — TELEPHONE (OUTPATIENT)
Dept: FAMILY MEDICINE CLINIC | Facility: HOSPITAL | Age: 82
End: 2022-08-22

## 2022-08-22 NOTE — TELEPHONE ENCOUNTER
Needs new script for cpap machine and supplies -    with water tank    Fax to [facility] - [phone]    Patient id [id number] - must be on both order forms

## 2022-08-30 ENCOUNTER — TELEPHONE (OUTPATIENT)
Dept: FAMILY MEDICINE CLINIC | Facility: HOSPITAL | Age: 82
End: 2022-08-30

## 2022-08-30 NOTE — TELEPHONE ENCOUNTER
Saurabh Madden is returning your call about him getting a new CPAP machine  Please call him    [phone]

## 2022-09-01 ENCOUNTER — TELEPHONE (OUTPATIENT)
Dept: SLEEP CENTER | Facility: CLINIC | Age: 82
End: 2022-09-01

## 2022-09-01 NOTE — TELEPHONE ENCOUNTER
Patient left message on nurse line requesting Rx for CPAP and Rx for PAP supplies  Returned call, left message to call nursing staff to schedule consultation  Per chart review: patient was last seen 6/9/2014 for a therapeutic polysomnography interpreted by Dr Ander Chu

## 2022-09-12 NOTE — TELEPHONE ENCOUNTER
Returned patient's call  Left call back message to schedule a consultation  Patient has not been seen by this office since 2014 and is looking for Rx for CPAP and Rx for PAP supplies